# Patient Record
Sex: FEMALE | Race: WHITE | Employment: OTHER | URBAN - METROPOLITAN AREA
[De-identification: names, ages, dates, MRNs, and addresses within clinical notes are randomized per-mention and may not be internally consistent; named-entity substitution may affect disease eponyms.]

---

## 2017-01-04 ENCOUNTER — OFFICE VISIT (OUTPATIENT)
Dept: FAMILY MEDICINE CLINIC | Age: 67
End: 2017-01-04

## 2017-01-04 VITALS
RESPIRATION RATE: 16 BRPM | HEIGHT: 64 IN | SYSTOLIC BLOOD PRESSURE: 110 MMHG | OXYGEN SATURATION: 97 % | DIASTOLIC BLOOD PRESSURE: 64 MMHG | HEART RATE: 68 BPM | BODY MASS INDEX: 28.17 KG/M2 | WEIGHT: 165 LBS

## 2017-01-04 DIAGNOSIS — E03.8 OTHER SPECIFIED HYPOTHYROIDISM: ICD-10-CM

## 2017-01-04 DIAGNOSIS — F41.9 ANXIETY: ICD-10-CM

## 2017-01-04 DIAGNOSIS — K21.9 GASTROESOPHAGEAL REFLUX DISEASE WITHOUT ESOPHAGITIS: ICD-10-CM

## 2017-01-04 DIAGNOSIS — N32.81 OVERACTIVE BLADDER: ICD-10-CM

## 2017-01-04 DIAGNOSIS — K58.2 IRRITABLE BOWEL SYNDROME WITH BOTH CONSTIPATION AND DIARRHEA: ICD-10-CM

## 2017-01-04 DIAGNOSIS — E78.00 PURE HYPERCHOLESTEROLEMIA: Primary | ICD-10-CM

## 2017-01-04 DIAGNOSIS — R06.2 WHEEZING: ICD-10-CM

## 2017-01-04 PROCEDURE — 99214 OFFICE O/P EST MOD 30 MIN: CPT | Performed by: NURSE PRACTITIONER

## 2017-01-04 RX ORDER — ESCITALOPRAM OXALATE 5 MG/1
5 TABLET ORAL DAILY
Qty: 30 TABLET | Refills: 3 | Status: SHIPPED | OUTPATIENT
Start: 2017-01-04 | End: 2017-02-16 | Stop reason: SDUPTHER

## 2017-01-04 RX ORDER — ATORVASTATIN CALCIUM 10 MG/1
10 TABLET, FILM COATED ORAL DAILY
Qty: 90 TABLET | Refills: 1 | Status: CANCELLED | OUTPATIENT
Start: 2017-01-04

## 2017-01-04 RX ORDER — ESOMEPRAZOLE MAGNESIUM 20 MG/1
20 FOR SUSPENSION ORAL DAILY
Qty: 30 PACKET | Refills: 2 | Status: SHIPPED | OUTPATIENT
Start: 2017-01-04 | End: 2017-01-05

## 2017-01-04 RX ORDER — ESOMEPRAZOLE MAGNESIUM 20 MG/1
20 FOR SUSPENSION ORAL DAILY
COMMUNITY
End: 2017-01-04 | Stop reason: SDUPTHER

## 2017-01-04 RX ORDER — CLONAZEPAM 0.5 MG/1
0.5 TABLET ORAL 2 TIMES DAILY
Qty: 60 TABLET | Refills: 3 | Status: SHIPPED | OUTPATIENT
Start: 2017-01-04 | End: 2017-05-02 | Stop reason: SDUPTHER

## 2017-01-04 RX ORDER — TOLTERODINE TARTRATE 4 MG
CAPSULE, EXT RELEASE 24 HR ORAL
Qty: 90 CAPSULE | Refills: 2 | Status: SHIPPED | OUTPATIENT
Start: 2017-01-04 | End: 2017-05-04 | Stop reason: SDUPTHER

## 2017-01-04 RX ORDER — IMIQUIMOD 12.5 MG/.25G
CREAM TOPICAL
COMMUNITY
End: 2017-08-01 | Stop reason: SDUPTHER

## 2017-01-04 RX ORDER — ALBUTEROL SULFATE 90 UG/1
AEROSOL, METERED RESPIRATORY (INHALATION)
Qty: 3 INHALER | Refills: 2 | Status: SHIPPED | OUTPATIENT
Start: 2017-01-04 | End: 2017-04-07

## 2017-01-04 RX ORDER — LEVOTHYROXINE SODIUM 0.1 MG/1
TABLET ORAL
Qty: 90 TABLET | Refills: 1 | Status: SHIPPED | OUTPATIENT
Start: 2017-01-04 | End: 2017-05-04 | Stop reason: SDUPTHER

## 2017-01-04 RX ORDER — ATORVASTATIN CALCIUM 10 MG/1
10 TABLET, FILM COATED ORAL DAILY
Qty: 90 TABLET | Refills: 1 | Status: SHIPPED | OUTPATIENT
Start: 2017-01-04 | End: 2017-04-21

## 2017-01-04 ASSESSMENT — ENCOUNTER SYMPTOMS
COUGH: 0
DIARRHEA: 1
VOMITING: 0
BACK PAIN: 0
NAUSEA: 0
CHEST TIGHTNESS: 0

## 2017-01-05 ENCOUNTER — TELEPHONE (OUTPATIENT)
Dept: FAMILY MEDICINE CLINIC | Age: 67
End: 2017-01-05

## 2017-01-05 DIAGNOSIS — K21.9 GASTROESOPHAGEAL REFLUX DISEASE WITHOUT ESOPHAGITIS: Primary | ICD-10-CM

## 2017-01-06 ENCOUNTER — TELEPHONE (OUTPATIENT)
Dept: FAMILY MEDICINE CLINIC | Age: 67
End: 2017-01-06

## 2017-01-10 ENCOUNTER — TELEPHONE (OUTPATIENT)
Dept: FAMILY MEDICINE CLINIC | Age: 67
End: 2017-01-10

## 2017-01-19 ENCOUNTER — TELEPHONE (OUTPATIENT)
Dept: FAMILY MEDICINE CLINIC | Age: 67
End: 2017-01-19

## 2017-01-19 DIAGNOSIS — N95.2 VAGINAL ATROPHY: ICD-10-CM

## 2017-01-19 DIAGNOSIS — Z87.412 H/O VULVAR DYSPLASIA: Primary | ICD-10-CM

## 2017-01-20 DIAGNOSIS — Z80.8 FAMILY HISTORY OF SKIN CANCER: ICD-10-CM

## 2017-01-20 DIAGNOSIS — Z87.412 H/O VULVAR DYSPLASIA: Primary | ICD-10-CM

## 2017-02-14 ENCOUNTER — TELEPHONE (OUTPATIENT)
Dept: FAMILY MEDICINE CLINIC | Age: 67
End: 2017-02-14

## 2017-02-15 ENCOUNTER — TELEPHONE (OUTPATIENT)
Dept: FAMILY MEDICINE CLINIC | Age: 67
End: 2017-02-15

## 2017-02-16 ENCOUNTER — OFFICE VISIT (OUTPATIENT)
Dept: FAMILY MEDICINE CLINIC | Age: 67
End: 2017-02-16

## 2017-02-16 VITALS
OXYGEN SATURATION: 97 % | BODY MASS INDEX: 28.49 KG/M2 | WEIGHT: 166 LBS | DIASTOLIC BLOOD PRESSURE: 54 MMHG | HEART RATE: 88 BPM | SYSTOLIC BLOOD PRESSURE: 100 MMHG

## 2017-02-16 DIAGNOSIS — F51.01 PRIMARY INSOMNIA: ICD-10-CM

## 2017-02-16 DIAGNOSIS — G89.29 CHRONIC MIDLINE THORACIC BACK PAIN: ICD-10-CM

## 2017-02-16 DIAGNOSIS — F41.9 ANXIETY: ICD-10-CM

## 2017-02-16 DIAGNOSIS — K21.9 GASTROESOPHAGEAL REFLUX DISEASE WITHOUT ESOPHAGITIS: Primary | ICD-10-CM

## 2017-02-16 DIAGNOSIS — M54.6 CHRONIC MIDLINE THORACIC BACK PAIN: ICD-10-CM

## 2017-02-16 PROCEDURE — 99213 OFFICE O/P EST LOW 20 MIN: CPT | Performed by: NURSE PRACTITIONER

## 2017-02-16 RX ORDER — ZOLPIDEM TARTRATE 10 MG/1
10 TABLET ORAL NIGHTLY PRN
Qty: 30 TABLET | Refills: 0 | Status: SHIPPED | OUTPATIENT
Start: 2017-02-16 | End: 2017-04-17 | Stop reason: SDUPTHER

## 2017-02-16 RX ORDER — ESOMEPRAZOLE MAGNESIUM 20 MG
20 CAPSULE,DELAYED RELEASE (ENTERIC COATED) ORAL DAILY
Qty: 90 CAPSULE | Refills: 1 | Status: SHIPPED | OUTPATIENT
Start: 2017-02-16 | End: 2017-04-19

## 2017-02-16 RX ORDER — ESCITALOPRAM OXALATE 5 MG/1
5 TABLET ORAL DAILY
Qty: 90 TABLET | Refills: 1 | Status: SHIPPED | OUTPATIENT
Start: 2017-02-16 | End: 2017-04-21

## 2017-02-16 ASSESSMENT — ENCOUNTER SYMPTOMS
VOMITING: 0
CHEST TIGHTNESS: 0
NAUSEA: 0
BACK PAIN: 0
COUGH: 0

## 2017-02-24 ENCOUNTER — TELEPHONE (OUTPATIENT)
Dept: FAMILY MEDICINE CLINIC | Age: 67
End: 2017-02-24

## 2017-02-24 RX ORDER — METHOCARBAMOL 500 MG/1
TABLET, FILM COATED ORAL
Qty: 40 TABLET | Refills: 1 | Status: SHIPPED | OUTPATIENT
Start: 2017-02-24 | End: 2017-04-05 | Stop reason: SDUPTHER

## 2017-02-27 ENCOUNTER — TELEPHONE (OUTPATIENT)
Dept: FAMILY MEDICINE CLINIC | Age: 67
End: 2017-02-27

## 2017-02-28 ENCOUNTER — TELEPHONE (OUTPATIENT)
Dept: FAMILY MEDICINE CLINIC | Age: 67
End: 2017-02-28

## 2017-02-28 ENCOUNTER — OFFICE VISIT (OUTPATIENT)
Dept: DERMATOLOGY | Age: 67
End: 2017-02-28

## 2017-02-28 DIAGNOSIS — L82.1 SEBORRHEIC KERATOSES: ICD-10-CM

## 2017-02-28 DIAGNOSIS — Z12.83 SCREENING EXAM FOR SKIN CANCER: ICD-10-CM

## 2017-02-28 DIAGNOSIS — D22.9 MULTIPLE BENIGN NEVI: Primary | ICD-10-CM

## 2017-02-28 PROCEDURE — 99214 OFFICE O/P EST MOD 30 MIN: CPT | Performed by: DERMATOLOGY

## 2017-03-01 DIAGNOSIS — Z13.820 OSTEOPOROSIS SCREENING: Primary | ICD-10-CM

## 2017-03-08 ENCOUNTER — HOSPITAL ENCOUNTER (OUTPATIENT)
Dept: PHYSICAL THERAPY | Age: 67
Discharge: HOME | End: 2017-03-31
Attending: NURSE PRACTITIONER | Admitting: NURSE PRACTITIONER

## 2017-03-08 ENCOUNTER — TELEPHONE (OUTPATIENT)
Dept: FAMILY MEDICINE CLINIC | Age: 67
End: 2017-03-08

## 2017-03-10 ENCOUNTER — OFFICE VISIT (OUTPATIENT)
Dept: FAMILY MEDICINE CLINIC | Age: 67
End: 2017-03-10

## 2017-03-10 VITALS
DIASTOLIC BLOOD PRESSURE: 72 MMHG | RESPIRATION RATE: 17 BRPM | OXYGEN SATURATION: 98 % | HEART RATE: 80 BPM | HEIGHT: 64 IN | WEIGHT: 161.8 LBS | BODY MASS INDEX: 27.62 KG/M2 | TEMPERATURE: 97.1 F | SYSTOLIC BLOOD PRESSURE: 116 MMHG

## 2017-03-10 DIAGNOSIS — R35.0 URINARY FREQUENCY: Primary | ICD-10-CM

## 2017-03-10 DIAGNOSIS — R31.9 URINARY TRACT INFECTION WITH HEMATURIA, SITE UNSPECIFIED: ICD-10-CM

## 2017-03-10 DIAGNOSIS — N39.0 URINARY TRACT INFECTION WITH HEMATURIA, SITE UNSPECIFIED: ICD-10-CM

## 2017-03-10 DIAGNOSIS — R31.9 HEMATURIA: ICD-10-CM

## 2017-03-10 LAB
BILIRUBIN, POC: NORMAL
BLOOD URINE, POC: NORMAL
CLARITY, POC: NORMAL
COLOR, POC: YELLOW
GLUCOSE URINE, POC: NORMAL
KETONES, POC: NORMAL
LEUKOCYTE EST, POC: NORMAL
NITRITE, POC: NORMAL
PH, POC: 5
PROTEIN, POC: 100
SPECIFIC GRAVITY, POC: <=1.005
UROBILINOGEN, POC: 0.2

## 2017-03-10 PROCEDURE — 81002 URINALYSIS NONAUTO W/O SCOPE: CPT | Performed by: NURSE PRACTITIONER

## 2017-03-10 PROCEDURE — 87086 URINE CULTURE/COLONY COUNT: CPT | Performed by: NURSE PRACTITIONER

## 2017-03-10 PROCEDURE — 99213 OFFICE O/P EST LOW 20 MIN: CPT | Performed by: NURSE PRACTITIONER

## 2017-03-10 RX ORDER — SULFAMETHOXAZOLE AND TRIMETHOPRIM 800; 160 MG/1; MG/1
1 TABLET ORAL 2 TIMES DAILY
Qty: 14 TABLET | Refills: 0 | Status: SHIPPED | OUTPATIENT
Start: 2017-03-10 | End: 2017-03-13 | Stop reason: SINTOL

## 2017-03-10 ASSESSMENT — ENCOUNTER SYMPTOMS
VOMITING: 0
BACK PAIN: 0
COUGH: 0
NAUSEA: 0
CHEST TIGHTNESS: 0

## 2017-03-12 LAB
ORGANISM: ABNORMAL
URINE CULTURE, ROUTINE: ABNORMAL

## 2017-03-13 ENCOUNTER — TELEPHONE (OUTPATIENT)
Dept: FAMILY MEDICINE CLINIC | Age: 67
End: 2017-03-13

## 2017-03-13 DIAGNOSIS — R31.9 URINARY TRACT INFECTION WITH HEMATURIA, SITE UNSPECIFIED: Primary | ICD-10-CM

## 2017-03-13 DIAGNOSIS — N39.0 URINARY TRACT INFECTION WITH HEMATURIA, SITE UNSPECIFIED: Primary | ICD-10-CM

## 2017-03-13 RX ORDER — CIPROFLOXACIN 500 MG/1
500 TABLET, FILM COATED ORAL 2 TIMES DAILY
Qty: 14 TABLET | Refills: 0 | Status: SHIPPED | OUTPATIENT
Start: 2017-03-13 | End: 2017-03-14

## 2017-03-14 ENCOUNTER — TELEPHONE (OUTPATIENT)
Dept: FAMILY MEDICINE CLINIC | Age: 67
End: 2017-03-14

## 2017-03-14 RX ORDER — NITROFURANTOIN 25; 75 MG/1; MG/1
100 CAPSULE ORAL 2 TIMES DAILY
Qty: 14 CAPSULE | Refills: 0 | Status: SHIPPED | OUTPATIENT
Start: 2017-03-14 | End: 2017-03-21

## 2017-03-24 ENCOUNTER — TELEPHONE (OUTPATIENT)
Dept: FAMILY MEDICINE CLINIC | Age: 67
End: 2017-03-24

## 2017-03-24 ENCOUNTER — HOSPITAL ENCOUNTER (OUTPATIENT)
Dept: PHYSICAL THERAPY | Age: 67
Discharge: HOME | End: 2017-03-24
Admitting: NURSE PRACTITIONER

## 2017-03-28 ENCOUNTER — HOSPITAL ENCOUNTER (OUTPATIENT)
Dept: PHYSICAL THERAPY | Age: 67
Discharge: HOME | End: 2017-03-28
Admitting: NURSE PRACTITIONER

## 2017-03-30 ENCOUNTER — HOSPITAL ENCOUNTER (OUTPATIENT)
Dept: PHYSICAL THERAPY | Age: 67
Discharge: HOME | End: 2017-03-30
Admitting: NURSE PRACTITIONER

## 2017-04-03 ENCOUNTER — HOSPITAL ENCOUNTER (OUTPATIENT)
Dept: PHYSICAL THERAPY | Age: 67
Discharge: HOME | End: 2017-04-03
Admitting: NURSE PRACTITIONER

## 2017-04-04 ENCOUNTER — TELEPHONE (OUTPATIENT)
Dept: FAMILY MEDICINE CLINIC | Age: 67
End: 2017-04-04

## 2017-04-06 ENCOUNTER — HOSPITAL ENCOUNTER (OUTPATIENT)
Dept: PHYSICAL THERAPY | Age: 67
Discharge: HOME | End: 2017-04-06
Admitting: NURSE PRACTITIONER

## 2017-04-06 DIAGNOSIS — K21.9 GASTROESOPHAGEAL REFLUX DISEASE WITHOUT ESOPHAGITIS: ICD-10-CM

## 2017-04-06 RX ORDER — METHOCARBAMOL 500 MG/1
TABLET, FILM COATED ORAL
Qty: 40 TABLET | Refills: 1 | Status: SHIPPED | OUTPATIENT
Start: 2017-04-06 | End: 2017-04-26 | Stop reason: SDUPTHER

## 2017-04-06 RX ORDER — ESOMEPRAZOLE MAGNESIUM 20 MG
20 CAPSULE,DELAYED RELEASE (ENTERIC COATED) ORAL DAILY
Qty: 90 CAPSULE | Refills: 1 | OUTPATIENT
Start: 2017-04-06

## 2017-04-07 ENCOUNTER — HOSPITAL ENCOUNTER (OUTPATIENT)
Dept: MAMMOGRAPHY | Age: 67
Discharge: HOME | End: 2017-04-07
Attending: NURSE PRACTITIONER | Admitting: NURSE PRACTITIONER

## 2017-04-07 ENCOUNTER — TELEPHONE (OUTPATIENT)
Dept: FAMILY MEDICINE CLINIC | Age: 67
End: 2017-04-07

## 2017-04-07 DIAGNOSIS — Z13.820 ENCOUNTER FOR SCREENING FOR OSTEOPOROSIS: ICD-10-CM

## 2017-04-07 DIAGNOSIS — Z13.820 SCREENING FOR OSTEOPOROSIS: ICD-10-CM

## 2017-04-07 DIAGNOSIS — J45.20 MILD INTERMITTENT ASTHMA WITHOUT COMPLICATION: ICD-10-CM

## 2017-04-07 RX ORDER — ALBUTEROL SULFATE 90 UG/1
2 AEROSOL, METERED RESPIRATORY (INHALATION) EVERY 6 HOURS PRN
Qty: 1 INHALER | Refills: 3 | Status: SHIPPED | OUTPATIENT
Start: 2017-04-07 | End: 2017-08-07 | Stop reason: SDUPTHER

## 2017-04-10 ENCOUNTER — HOSPITAL ENCOUNTER (OUTPATIENT)
Dept: PHYSICAL THERAPY | Age: 67
Discharge: HOME | End: 2017-04-10
Admitting: NURSE PRACTITIONER

## 2017-04-10 DIAGNOSIS — J45.20 MILD INTERMITTENT ASTHMA WITHOUT COMPLICATION: ICD-10-CM

## 2017-04-10 DIAGNOSIS — K21.9 GASTROESOPHAGEAL REFLUX DISEASE WITHOUT ESOPHAGITIS: ICD-10-CM

## 2017-04-10 RX ORDER — ALBUTEROL SULFATE 90 UG/1
2 AEROSOL, METERED RESPIRATORY (INHALATION) EVERY 6 HOURS PRN
Qty: 3 INHALER | Refills: 3 | OUTPATIENT
Start: 2017-04-10

## 2017-04-10 RX ORDER — METHOCARBAMOL 500 MG/1
500 TABLET, FILM COATED ORAL 4 TIMES DAILY
Qty: 120 TABLET | Refills: 1 | OUTPATIENT
Start: 2017-04-10

## 2017-04-10 RX ORDER — ESOMEPRAZOLE MAGNESIUM 20 MG
20 CAPSULE,DELAYED RELEASE (ENTERIC COATED) ORAL DAILY
Qty: 270 CAPSULE | Refills: 1 | OUTPATIENT
Start: 2017-04-10

## 2017-04-12 ENCOUNTER — TELEPHONE (OUTPATIENT)
Dept: FAMILY MEDICINE CLINIC | Age: 67
End: 2017-04-12

## 2017-04-12 DIAGNOSIS — M79.10 MYALGIA: Primary | ICD-10-CM

## 2017-04-13 ENCOUNTER — HOSPITAL ENCOUNTER (OUTPATIENT)
Dept: PHYSICAL THERAPY | Age: 67
Discharge: HOME | End: 2017-04-13
Admitting: NURSE PRACTITIONER

## 2017-04-13 ENCOUNTER — TELEPHONE (OUTPATIENT)
Dept: FAMILY MEDICINE CLINIC | Age: 67
End: 2017-04-13

## 2017-04-13 DIAGNOSIS — M79.10 MYALGIA: ICD-10-CM

## 2017-04-13 LAB — TOTAL CK: 173 U/L (ref 26–192)

## 2017-04-17 DIAGNOSIS — F51.01 PRIMARY INSOMNIA: ICD-10-CM

## 2017-04-17 RX ORDER — ZOLPIDEM TARTRATE 10 MG/1
10 TABLET ORAL NIGHTLY PRN
Qty: 30 TABLET | Refills: 0 | Status: SHIPPED | OUTPATIENT
Start: 2017-04-17 | End: 2017-05-24 | Stop reason: SDUPTHER

## 2017-04-18 ENCOUNTER — TELEPHONE (OUTPATIENT)
Dept: FAMILY MEDICINE CLINIC | Age: 67
End: 2017-04-18

## 2017-04-19 ENCOUNTER — NURSE ONLY (OUTPATIENT)
Dept: FAMILY MEDICINE CLINIC | Age: 67
End: 2017-04-19

## 2017-04-19 ENCOUNTER — HOSPITAL ENCOUNTER (OUTPATIENT)
Dept: PHYSICAL THERAPY | Age: 67
Discharge: HOME | End: 2017-04-19
Admitting: NURSE PRACTITIONER

## 2017-04-19 DIAGNOSIS — K21.9 GASTROESOPHAGEAL REFLUX DISEASE WITHOUT ESOPHAGITIS: ICD-10-CM

## 2017-04-19 DIAGNOSIS — R39.9 UTI SYMPTOMS: Primary | ICD-10-CM

## 2017-04-19 DIAGNOSIS — K21.9 GASTROESOPHAGEAL REFLUX DISEASE WITHOUT ESOPHAGITIS: Primary | ICD-10-CM

## 2017-04-19 LAB
BILIRUBIN, POC: NORMAL
BLOOD URINE, POC: NORMAL
CLARITY, POC: CLEAR
COLOR, POC: YELLOW
GLUCOSE URINE, POC: NORMAL
KETONES, POC: NORMAL
LEUKOCYTE EST, POC: NORMAL
NITRITE, POC: NORMAL
PH, POC: 6
PROTEIN, POC: NORMAL
SPECIFIC GRAVITY, POC: 1.01
UROBILINOGEN, POC: 0.2

## 2017-04-19 PROCEDURE — 81002 URINALYSIS NONAUTO W/O SCOPE: CPT | Performed by: NURSE PRACTITIONER

## 2017-04-19 RX ORDER — ESOMEPRAZOLE MAGNESIUM 20 MG
20 CAPSULE,DELAYED RELEASE (ENTERIC COATED) ORAL DAILY
Qty: 90 CAPSULE | Refills: 1 | Status: CANCELLED | OUTPATIENT
Start: 2017-04-19

## 2017-04-20 ENCOUNTER — TELEPHONE (OUTPATIENT)
Dept: FAMILY MEDICINE CLINIC | Age: 67
End: 2017-04-20

## 2017-04-21 ENCOUNTER — TELEPHONE (OUTPATIENT)
Dept: INTERNAL MEDICINE CLINIC | Age: 67
End: 2017-04-21

## 2017-04-21 ENCOUNTER — OFFICE VISIT (OUTPATIENT)
Dept: INTERNAL MEDICINE CLINIC | Age: 67
End: 2017-04-21

## 2017-04-21 VITALS
HEIGHT: 64 IN | HEART RATE: 72 BPM | WEIGHT: 161 LBS | RESPIRATION RATE: 18 BRPM | SYSTOLIC BLOOD PRESSURE: 115 MMHG | DIASTOLIC BLOOD PRESSURE: 70 MMHG | BODY MASS INDEX: 27.49 KG/M2

## 2017-04-21 DIAGNOSIS — G89.29 CHRONIC MIDLINE THORACIC BACK PAIN: ICD-10-CM

## 2017-04-21 DIAGNOSIS — J45.20 MILD INTERMITTENT ASTHMA WITHOUT COMPLICATION: ICD-10-CM

## 2017-04-21 DIAGNOSIS — E03.9 ACQUIRED HYPOTHYROIDISM: ICD-10-CM

## 2017-04-21 DIAGNOSIS — F51.01 PRIMARY INSOMNIA: ICD-10-CM

## 2017-04-21 DIAGNOSIS — M54.6 CHRONIC MIDLINE THORACIC BACK PAIN: ICD-10-CM

## 2017-04-21 DIAGNOSIS — F41.9 ANXIETY: Primary | ICD-10-CM

## 2017-04-21 DIAGNOSIS — K21.9 GASTROESOPHAGEAL REFLUX DISEASE WITHOUT ESOPHAGITIS: ICD-10-CM

## 2017-04-21 LAB — URINE CULTURE, ROUTINE: NORMAL

## 2017-04-21 PROCEDURE — 99204 OFFICE O/P NEW MOD 45 MIN: CPT | Performed by: INTERNAL MEDICINE

## 2017-04-21 RX ORDER — PAROXETINE 10 MG/1
10 TABLET, FILM COATED ORAL DAILY
Qty: 30 TABLET | Refills: 0 | Status: CANCELLED | OUTPATIENT
Start: 2017-04-21

## 2017-04-21 RX ORDER — DULOXETIN HYDROCHLORIDE 30 MG/1
30 CAPSULE, DELAYED RELEASE ORAL DAILY
Qty: 30 CAPSULE | Refills: 0 | Status: SHIPPED | OUTPATIENT
Start: 2017-04-21 | End: 2017-06-26

## 2017-04-21 RX ORDER — MONTELUKAST SODIUM 10 MG/1
10 TABLET ORAL NIGHTLY
Qty: 30 TABLET | Refills: 0 | Status: SHIPPED | OUTPATIENT
Start: 2017-04-21 | End: 2017-06-26

## 2017-04-21 RX ORDER — GABAPENTIN 300 MG/1
300 CAPSULE ORAL 4 TIMES DAILY
Qty: 120 CAPSULE | Refills: 0 | Status: SHIPPED | OUTPATIENT
Start: 2017-04-21 | End: 2017-06-26

## 2017-04-26 ENCOUNTER — HOSPITAL ENCOUNTER (OUTPATIENT)
Dept: PHYSICAL THERAPY | Age: 67
Discharge: HOME | End: 2017-04-26
Admitting: NURSE PRACTITIONER

## 2017-04-27 ENCOUNTER — TELEPHONE (OUTPATIENT)
Dept: FAMILY MEDICINE CLINIC | Age: 67
End: 2017-04-27

## 2017-04-27 DIAGNOSIS — F41.9 ANXIETY: ICD-10-CM

## 2017-04-27 RX ORDER — METHOCARBAMOL 500 MG/1
500 TABLET, FILM COATED ORAL 2 TIMES DAILY
Qty: 60 TABLET | Refills: 11 | Status: SHIPPED | OUTPATIENT
Start: 2017-04-27 | End: 2017-05-31 | Stop reason: SDUPTHER

## 2017-04-27 RX ORDER — CLONAZEPAM 0.5 MG/1
0.5 TABLET ORAL 2 TIMES DAILY
Qty: 60 TABLET | Refills: 3 | OUTPATIENT
Start: 2017-04-27

## 2017-04-28 ENCOUNTER — TELEPHONE (OUTPATIENT)
Dept: FAMILY MEDICINE CLINIC | Age: 67
End: 2017-04-28

## 2017-04-28 ENCOUNTER — NURSE ONLY (OUTPATIENT)
Dept: FAMILY MEDICINE CLINIC | Age: 67
End: 2017-04-28

## 2017-04-28 DIAGNOSIS — R79.89 LOW SERUM SODIUM: Primary | ICD-10-CM

## 2017-04-28 LAB — SODIUM BLD-SCNC: 143 MMOL/L (ref 136–145)

## 2017-04-28 PROCEDURE — 36415 COLL VENOUS BLD VENIPUNCTURE: CPT | Performed by: NURSE PRACTITIONER

## 2017-04-29 LAB — POTASSIUM SERPL-SCNC: 4.2 MMOL/L (ref 3.5–5.1)

## 2017-05-01 ENCOUNTER — HOSPITAL ENCOUNTER (OUTPATIENT)
Dept: PHYSICAL THERAPY | Age: 67
Discharge: HOME | End: 2017-05-01
Admitting: NURSE PRACTITIONER

## 2017-05-01 ENCOUNTER — TELEPHONE (OUTPATIENT)
Dept: FAMILY MEDICINE CLINIC | Age: 67
End: 2017-05-01

## 2017-05-02 DIAGNOSIS — F41.9 ANXIETY: ICD-10-CM

## 2017-05-03 RX ORDER — CLONAZEPAM 0.5 MG/1
0.5 TABLET ORAL 2 TIMES DAILY
Qty: 60 TABLET | Refills: 0 | Status: SHIPPED | OUTPATIENT
Start: 2017-05-03 | End: 2017-05-31 | Stop reason: SDUPTHER

## 2017-05-04 ENCOUNTER — TELEPHONE (OUTPATIENT)
Dept: FAMILY MEDICINE CLINIC | Age: 67
End: 2017-05-04

## 2017-05-04 ENCOUNTER — HOSPITAL ENCOUNTER (OUTPATIENT)
Dept: PHYSICAL THERAPY | Age: 67
Discharge: HOME | End: 2017-05-04
Admitting: NURSE PRACTITIONER

## 2017-05-04 ENCOUNTER — OFFICE VISIT (OUTPATIENT)
Dept: FAMILY MEDICINE CLINIC | Age: 67
End: 2017-05-04

## 2017-05-04 VITALS
OXYGEN SATURATION: 96 % | WEIGHT: 156.6 LBS | SYSTOLIC BLOOD PRESSURE: 122 MMHG | BODY MASS INDEX: 26.73 KG/M2 | HEIGHT: 64 IN | DIASTOLIC BLOOD PRESSURE: 70 MMHG | HEART RATE: 76 BPM | RESPIRATION RATE: 13 BRPM

## 2017-05-04 DIAGNOSIS — E03.8 OTHER SPECIFIED HYPOTHYROIDISM: ICD-10-CM

## 2017-05-04 DIAGNOSIS — M51.36 DDD (DEGENERATIVE DISC DISEASE), LUMBAR: Primary | ICD-10-CM

## 2017-05-04 DIAGNOSIS — F41.9 ANXIETY: ICD-10-CM

## 2017-05-04 DIAGNOSIS — N32.81 OVERACTIVE BLADDER: ICD-10-CM

## 2017-05-04 DIAGNOSIS — E87.1 HYPONATREMIA: ICD-10-CM

## 2017-05-04 PROCEDURE — 99213 OFFICE O/P EST LOW 20 MIN: CPT | Performed by: NURSE PRACTITIONER

## 2017-05-04 RX ORDER — TOLTERODINE 4 MG/1
CAPSULE, EXTENDED RELEASE ORAL
Qty: 90 CAPSULE | Refills: 2 | Status: SHIPPED | OUTPATIENT
Start: 2017-05-04 | End: 2017-06-29 | Stop reason: SDUPTHER

## 2017-05-04 RX ORDER — CLONAZEPAM 0.5 MG/1
0.5 TABLET ORAL 2 TIMES DAILY
Qty: 60 TABLET | Refills: 0 | Status: CANCELLED | OUTPATIENT
Start: 2017-05-04

## 2017-05-04 RX ORDER — LEVOTHYROXINE SODIUM 0.1 MG/1
TABLET ORAL
Qty: 90 TABLET | Refills: 1 | Status: SHIPPED | OUTPATIENT
Start: 2017-05-04 | End: 2017-06-26 | Stop reason: SDUPTHER

## 2017-05-04 RX ORDER — IBUPROFEN 800 MG/1
800 TABLET ORAL EVERY 6 HOURS PRN
Qty: 120 TABLET | Refills: 3 | Status: SHIPPED | OUTPATIENT
Start: 2017-05-04 | End: 2017-08-07 | Stop reason: SDUPTHER

## 2017-05-04 RX ORDER — POLYETHYLENE GLYCOL 3350 17 G/17G
17 POWDER, FOR SOLUTION ORAL DAILY
COMMUNITY
End: 2017-05-04 | Stop reason: CLARIF

## 2017-05-05 LAB
ANION GAP SERPL CALCULATED.3IONS-SCNC: 11 MMOL/L (ref 3–16)
BUN BLDV-MCNC: 10 MG/DL (ref 7–20)
CALCIUM SERPL-MCNC: 9.1 MG/DL (ref 8.3–10.6)
CHLORIDE BLD-SCNC: 101 MMOL/L (ref 99–110)
CO2: 28 MMOL/L (ref 21–32)
CREAT SERPL-MCNC: 1 MG/DL (ref 0.6–1.2)
GFR AFRICAN AMERICAN: >60
GFR NON-AFRICAN AMERICAN: 56
GLUCOSE BLD-MCNC: 89 MG/DL (ref 70–99)
POTASSIUM SERPL-SCNC: 4.6 MMOL/L (ref 3.5–5.1)
SODIUM BLD-SCNC: 140 MMOL/L (ref 136–145)

## 2017-05-08 ENCOUNTER — HOSPITAL ENCOUNTER (OUTPATIENT)
Dept: PHYSICAL THERAPY | Age: 67
Discharge: HOME | End: 2017-05-08
Admitting: NURSE PRACTITIONER

## 2017-05-08 ENCOUNTER — TELEPHONE (OUTPATIENT)
Dept: FAMILY MEDICINE CLINIC | Age: 67
End: 2017-05-08

## 2017-05-10 ENCOUNTER — HOSPITAL ENCOUNTER (OUTPATIENT)
Dept: PHYSICAL THERAPY | Age: 67
Discharge: HOME | End: 2017-05-10
Admitting: NURSE PRACTITIONER

## 2017-05-15 ENCOUNTER — HOSPITAL ENCOUNTER (OUTPATIENT)
Dept: PHYSICAL THERAPY | Age: 67
Discharge: HOME | End: 2017-05-15
Admitting: NURSE PRACTITIONER

## 2017-05-17 ENCOUNTER — TELEPHONE (OUTPATIENT)
Dept: FAMILY MEDICINE CLINIC | Age: 67
End: 2017-05-17

## 2017-05-17 ENCOUNTER — HOSPITAL ENCOUNTER (OUTPATIENT)
Dept: PHYSICAL THERAPY | Age: 67
Discharge: HOME | End: 2017-05-17
Admitting: NURSE PRACTITIONER

## 2017-05-22 ASSESSMENT — ENCOUNTER SYMPTOMS
COUGH: 0
VOMITING: 0
CONSTIPATION: 1
NAUSEA: 0
BACK PAIN: 1
CHEST TIGHTNESS: 0

## 2017-05-31 ENCOUNTER — OFFICE VISIT (OUTPATIENT)
Dept: FAMILY MEDICINE CLINIC | Age: 67
End: 2017-05-31

## 2017-05-31 VITALS
HEIGHT: 64 IN | HEART RATE: 74 BPM | BODY MASS INDEX: 27.76 KG/M2 | WEIGHT: 162.6 LBS | DIASTOLIC BLOOD PRESSURE: 70 MMHG | OXYGEN SATURATION: 98 % | RESPIRATION RATE: 13 BRPM | SYSTOLIC BLOOD PRESSURE: 116 MMHG

## 2017-05-31 DIAGNOSIS — L30.9 DERMATITIS: ICD-10-CM

## 2017-05-31 DIAGNOSIS — F41.9 ANXIETY: ICD-10-CM

## 2017-05-31 DIAGNOSIS — J45.20 MILD INTERMITTENT ASTHMA WITHOUT COMPLICATION: ICD-10-CM

## 2017-05-31 DIAGNOSIS — K21.9 GASTROESOPHAGEAL REFLUX DISEASE WITHOUT ESOPHAGITIS: ICD-10-CM

## 2017-05-31 DIAGNOSIS — E78.2 MIXED HYPERLIPIDEMIA: ICD-10-CM

## 2017-05-31 DIAGNOSIS — Z00.00 PREVENTATIVE HEALTH CARE: Primary | ICD-10-CM

## 2017-05-31 DIAGNOSIS — M47.817 LUMBOSACRAL SPONDYLOSIS WITHOUT MYELOPATHY: Chronic | ICD-10-CM

## 2017-05-31 DIAGNOSIS — Z87.412 H/O VULVAR DYSPLASIA: ICD-10-CM

## 2017-05-31 DIAGNOSIS — E03.9 ACQUIRED HYPOTHYROIDISM: ICD-10-CM

## 2017-05-31 DIAGNOSIS — F51.01 PRIMARY INSOMNIA: ICD-10-CM

## 2017-05-31 DIAGNOSIS — M51.36 DDD (DEGENERATIVE DISC DISEASE), LUMBAR: ICD-10-CM

## 2017-05-31 LAB
A/G RATIO: 1.7 (ref 1.1–2.2)
ALBUMIN SERPL-MCNC: 3.8 G/DL (ref 3.4–5)
ALP BLD-CCNC: 49 U/L (ref 40–129)
ALT SERPL-CCNC: 15 U/L (ref 10–40)
ANION GAP SERPL CALCULATED.3IONS-SCNC: 10 MMOL/L (ref 3–16)
AST SERPL-CCNC: 19 U/L (ref 15–37)
BILIRUB SERPL-MCNC: <0.2 MG/DL (ref 0–1)
BUN BLDV-MCNC: 12 MG/DL (ref 7–20)
CALCIUM SERPL-MCNC: 8.7 MG/DL (ref 8.3–10.6)
CHLORIDE BLD-SCNC: 102 MMOL/L (ref 99–110)
CHOLESTEROL, TOTAL: 168 MG/DL (ref 0–199)
CO2: 31 MMOL/L (ref 21–32)
CREAT SERPL-MCNC: 1.1 MG/DL (ref 0.6–1.2)
GFR AFRICAN AMERICAN: >60
GFR NON-AFRICAN AMERICAN: 50
GLOBULIN: 2.2 G/DL
GLUCOSE BLD-MCNC: 79 MG/DL (ref 70–99)
HDLC SERPL-MCNC: 58 MG/DL (ref 40–60)
LDL CHOLESTEROL CALCULATED: 95 MG/DL
POTASSIUM SERPL-SCNC: 4.5 MMOL/L (ref 3.5–5.1)
SODIUM BLD-SCNC: 143 MMOL/L (ref 136–145)
TOTAL PROTEIN: 6 G/DL (ref 6.4–8.2)
TRIGL SERPL-MCNC: 77 MG/DL (ref 0–150)
VLDLC SERPL CALC-MCNC: 15 MG/DL

## 2017-05-31 PROCEDURE — 3288F FALL RISK ASSESSMENT DOCD: CPT | Performed by: NURSE PRACTITIONER

## 2017-05-31 PROCEDURE — 99397 PER PM REEVAL EST PAT 65+ YR: CPT | Performed by: NURSE PRACTITIONER

## 2017-05-31 RX ORDER — TRIAMCINOLONE ACETONIDE 1 MG/G
CREAM TOPICAL
Qty: 15 G | Refills: 0 | Status: SHIPPED | OUTPATIENT
Start: 2017-05-31 | End: 2017-12-13

## 2017-05-31 RX ORDER — METHOCARBAMOL 500 MG/1
500 TABLET, FILM COATED ORAL 3 TIMES DAILY
Qty: 90 TABLET | Refills: 0 | Status: SHIPPED | OUTPATIENT
Start: 2017-05-31 | End: 2017-07-29 | Stop reason: SDUPTHER

## 2017-05-31 RX ORDER — CLONAZEPAM 0.5 MG/1
0.5 TABLET ORAL 2 TIMES DAILY
Qty: 60 TABLET | Refills: 0 | Status: SHIPPED | OUTPATIENT
Start: 2017-05-31 | End: 2017-06-26 | Stop reason: SDUPTHER

## 2017-05-31 ASSESSMENT — ENCOUNTER SYMPTOMS
NAUSEA: 0
BACK PAIN: 1
COLOR CHANGE: 0
VOMITING: 0
COUGH: 0
CHEST TIGHTNESS: 0

## 2017-06-02 ENCOUNTER — HOSPITAL ENCOUNTER (OUTPATIENT)
Dept: PHYSICAL THERAPY | Age: 67
Discharge: HOME | End: 2017-06-02
Admitting: NURSE PRACTITIONER

## 2017-06-05 ENCOUNTER — HOSPITAL ENCOUNTER (OUTPATIENT)
Dept: PHYSICAL THERAPY | Age: 67
Discharge: HOME | End: 2017-06-05
Admitting: NURSE PRACTITIONER

## 2017-06-07 ENCOUNTER — HOSPITAL ENCOUNTER (OUTPATIENT)
Dept: PHYSICAL THERAPY | Age: 67
Discharge: HOME | End: 2017-06-07
Admitting: NURSE PRACTITIONER

## 2017-06-15 ENCOUNTER — HOSPITAL ENCOUNTER (OUTPATIENT)
Dept: PHYSICAL THERAPY | Age: 67
Discharge: HOME | End: 2017-06-15
Admitting: NURSE PRACTITIONER

## 2017-06-26 ENCOUNTER — OFFICE VISIT (OUTPATIENT)
Dept: FAMILY MEDICINE CLINIC | Age: 67
End: 2017-06-26

## 2017-06-26 ENCOUNTER — HOSPITAL ENCOUNTER (OUTPATIENT)
Dept: PHYSICAL THERAPY | Age: 67
Discharge: HOME | End: 2017-06-26
Admitting: NURSE PRACTITIONER

## 2017-06-26 VITALS
HEIGHT: 64 IN | WEIGHT: 158.4 LBS | DIASTOLIC BLOOD PRESSURE: 82 MMHG | OXYGEN SATURATION: 97 % | SYSTOLIC BLOOD PRESSURE: 116 MMHG | HEART RATE: 66 BPM | BODY MASS INDEX: 27.04 KG/M2 | RESPIRATION RATE: 13 BRPM

## 2017-06-26 DIAGNOSIS — E03.8 OTHER SPECIFIED HYPOTHYROIDISM: ICD-10-CM

## 2017-06-26 DIAGNOSIS — E03.9 ACQUIRED HYPOTHYROIDISM: ICD-10-CM

## 2017-06-26 DIAGNOSIS — F41.9 ANXIETY: ICD-10-CM

## 2017-06-26 DIAGNOSIS — F51.01 PRIMARY INSOMNIA: Primary | ICD-10-CM

## 2017-06-26 PROCEDURE — 99213 OFFICE O/P EST LOW 20 MIN: CPT | Performed by: NURSE PRACTITIONER

## 2017-06-26 RX ORDER — LEVOTHYROXINE SODIUM 0.1 MG/1
TABLET ORAL
Qty: 90 TABLET | Refills: 1 | Status: SHIPPED | OUTPATIENT
Start: 2017-06-26 | End: 2017-08-07 | Stop reason: SDUPTHER

## 2017-06-26 RX ORDER — ZOLPIDEM TARTRATE 10 MG/1
TABLET ORAL
Qty: 30 TABLET | Refills: 0 | Status: SHIPPED | OUTPATIENT
Start: 2017-06-26 | End: 2017-08-07 | Stop reason: SDUPTHER

## 2017-06-26 RX ORDER — CLONAZEPAM 0.5 MG/1
0.5 TABLET ORAL 2 TIMES DAILY
Qty: 60 TABLET | Refills: 0 | Status: SHIPPED | OUTPATIENT
Start: 2017-06-26 | End: 2017-07-29 | Stop reason: SDUPTHER

## 2017-06-26 ASSESSMENT — ENCOUNTER SYMPTOMS
BACK PAIN: 0
CHEST TIGHTNESS: 0
COUGH: 0
NAUSEA: 0
VOMITING: 0

## 2017-06-27 ENCOUNTER — TELEPHONE (OUTPATIENT)
Dept: FAMILY MEDICINE CLINIC | Age: 67
End: 2017-06-27

## 2017-06-28 ENCOUNTER — OFFICE VISIT (OUTPATIENT)
Dept: ORTHOPEDIC SURGERY | Age: 67
End: 2017-06-28

## 2017-06-28 VITALS
HEIGHT: 63 IN | SYSTOLIC BLOOD PRESSURE: 101 MMHG | WEIGHT: 166 LBS | BODY MASS INDEX: 29.41 KG/M2 | HEART RATE: 73 BPM | DIASTOLIC BLOOD PRESSURE: 59 MMHG

## 2017-06-28 DIAGNOSIS — R05.9 COUGH: ICD-10-CM

## 2017-06-28 DIAGNOSIS — M54.50 PAIN OF LUMBAR SPINE: Primary | ICD-10-CM

## 2017-06-28 DIAGNOSIS — F17.200 TOBACCO USE DISORDER: Primary | ICD-10-CM

## 2017-06-28 DIAGNOSIS — M54.6 THORACIC SPINE PAIN: ICD-10-CM

## 2017-06-28 PROCEDURE — 99213 OFFICE O/P EST LOW 20 MIN: CPT | Performed by: PHYSICAL MEDICINE & REHABILITATION

## 2017-06-28 PROCEDURE — 72100 X-RAY EXAM L-S SPINE 2/3 VWS: CPT | Performed by: PHYSICAL MEDICINE & REHABILITATION

## 2017-06-28 PROCEDURE — 72070 X-RAY EXAM THORAC SPINE 2VWS: CPT | Performed by: PHYSICAL MEDICINE & REHABILITATION

## 2017-06-29 ENCOUNTER — TELEPHONE (OUTPATIENT)
Dept: FAMILY MEDICINE CLINIC | Age: 67
End: 2017-06-29

## 2017-06-30 ENCOUNTER — TELEPHONE (OUTPATIENT)
Dept: ORTHOPEDIC SURGERY | Age: 67
End: 2017-06-30

## 2017-06-30 ENCOUNTER — NURSE ONLY (OUTPATIENT)
Dept: URGENT CARE | Age: 67
End: 2017-06-30

## 2017-06-30 DIAGNOSIS — R05.9 COUGH: Primary | ICD-10-CM

## 2017-07-07 ENCOUNTER — TELEPHONE (OUTPATIENT)
Dept: FAMILY MEDICINE CLINIC | Age: 67
End: 2017-07-07

## 2017-07-07 DIAGNOSIS — K21.9 GASTROESOPHAGEAL REFLUX DISEASE WITHOUT ESOPHAGITIS: Primary | ICD-10-CM

## 2017-07-07 RX ORDER — PANTOPRAZOLE SODIUM 40 MG/1
40 TABLET, DELAYED RELEASE ORAL DAILY
Qty: 30 TABLET | Refills: 3 | Status: SHIPPED | OUTPATIENT
Start: 2017-07-07 | End: 2017-12-13

## 2017-07-14 ENCOUNTER — TELEPHONE (OUTPATIENT)
Dept: FAMILY MEDICINE CLINIC | Age: 67
End: 2017-07-14

## 2017-07-14 ENCOUNTER — TELEPHONE (OUTPATIENT)
Dept: ORTHOPEDIC SURGERY | Age: 67
End: 2017-07-14

## 2017-07-14 DIAGNOSIS — M25.562 CHRONIC PAIN OF LEFT KNEE: Primary | ICD-10-CM

## 2017-07-14 DIAGNOSIS — G89.29 CHRONIC PAIN OF LEFT KNEE: Primary | ICD-10-CM

## 2017-07-17 ENCOUNTER — TELEPHONE (OUTPATIENT)
Dept: FAMILY MEDICINE CLINIC | Age: 67
End: 2017-07-17

## 2017-07-18 DIAGNOSIS — R25.2 CRAMP OF BOTH LOWER EXTREMITIES: Primary | ICD-10-CM

## 2017-07-19 DIAGNOSIS — R25.2 CRAMP OF BOTH LOWER EXTREMITIES: ICD-10-CM

## 2017-07-19 LAB
ANION GAP SERPL CALCULATED.3IONS-SCNC: 11 MMOL/L (ref 3–16)
BUN BLDV-MCNC: 16 MG/DL (ref 7–20)
CALCIUM SERPL-MCNC: 9.2 MG/DL (ref 8.3–10.6)
CHLORIDE BLD-SCNC: 100 MMOL/L (ref 99–110)
CO2: 28 MMOL/L (ref 21–32)
CREAT SERPL-MCNC: 1 MG/DL (ref 0.6–1.2)
GFR AFRICAN AMERICAN: >60
GFR NON-AFRICAN AMERICAN: 56
GLUCOSE BLD-MCNC: 79 MG/DL (ref 70–99)
POTASSIUM SERPL-SCNC: 4.1 MMOL/L (ref 3.5–5.1)
SODIUM BLD-SCNC: 139 MMOL/L (ref 136–145)

## 2017-07-24 ENCOUNTER — OFFICE VISIT (OUTPATIENT)
Dept: ORTHOPEDIC SURGERY | Age: 67
End: 2017-07-24

## 2017-07-24 VITALS — HEIGHT: 64 IN | BODY MASS INDEX: 27.25 KG/M2 | WEIGHT: 159.61 LBS

## 2017-07-24 DIAGNOSIS — M17.12 ARTHRITIS OF LEFT KNEE: ICD-10-CM

## 2017-07-24 DIAGNOSIS — M17.11 ARTHRITIS OF RIGHT KNEE: ICD-10-CM

## 2017-07-24 DIAGNOSIS — M25.561 PAIN IN BOTH KNEES, UNSPECIFIED CHRONICITY: Primary | ICD-10-CM

## 2017-07-24 DIAGNOSIS — M25.562 PAIN IN BOTH KNEES, UNSPECIFIED CHRONICITY: Primary | ICD-10-CM

## 2017-07-24 PROCEDURE — 73562 X-RAY EXAM OF KNEE 3: CPT | Performed by: ORTHOPAEDIC SURGERY

## 2017-07-24 PROCEDURE — 99214 OFFICE O/P EST MOD 30 MIN: CPT | Performed by: ORTHOPAEDIC SURGERY

## 2017-07-31 DIAGNOSIS — M47.817 LUMBOSACRAL SPONDYLOSIS WITHOUT MYELOPATHY: Chronic | ICD-10-CM

## 2017-07-31 DIAGNOSIS — F41.9 ANXIETY: ICD-10-CM

## 2017-07-31 RX ORDER — METHOCARBAMOL 500 MG/1
500 TABLET, FILM COATED ORAL 3 TIMES DAILY
Qty: 90 TABLET | Refills: 0 | Status: SHIPPED | OUTPATIENT
Start: 2017-07-31 | End: 2017-08-07 | Stop reason: SDUPTHER

## 2017-07-31 RX ORDER — CLONAZEPAM 0.5 MG/1
0.5 TABLET ORAL 2 TIMES DAILY
Qty: 60 TABLET | Refills: 0 | Status: SHIPPED | OUTPATIENT
Start: 2017-07-31 | End: 2017-08-28 | Stop reason: SDUPTHER

## 2017-08-02 ENCOUNTER — OFFICE VISIT (OUTPATIENT)
Dept: FAMILY MEDICINE CLINIC | Age: 67
End: 2017-08-02

## 2017-08-02 VITALS
HEART RATE: 80 BPM | HEIGHT: 64 IN | WEIGHT: 160.4 LBS | RESPIRATION RATE: 12 BRPM | BODY MASS INDEX: 27.39 KG/M2 | OXYGEN SATURATION: 98 % | SYSTOLIC BLOOD PRESSURE: 128 MMHG | DIASTOLIC BLOOD PRESSURE: 70 MMHG

## 2017-08-02 DIAGNOSIS — J45.20 MILD INTERMITTENT ASTHMA WITHOUT COMPLICATION: ICD-10-CM

## 2017-08-02 DIAGNOSIS — M16.0 OSTEOARTHRITIS OF BOTH HIPS, UNSPECIFIED OSTEOARTHRITIS TYPE: Primary | ICD-10-CM

## 2017-08-02 DIAGNOSIS — M51.36 DDD (DEGENERATIVE DISC DISEASE), LUMBAR: ICD-10-CM

## 2017-08-02 PROCEDURE — 99213 OFFICE O/P EST LOW 20 MIN: CPT | Performed by: NURSE PRACTITIONER

## 2017-08-02 RX ORDER — HYDROCORTISONE ACETATE 0.5 %
1 CREAM (GRAM) TOPICAL 2 TIMES DAILY
Qty: 60 CAPSULE | Refills: 5 | Status: SHIPPED | OUTPATIENT
Start: 2017-08-02 | End: 2018-02-27 | Stop reason: ALTCHOICE

## 2017-08-02 ASSESSMENT — ENCOUNTER SYMPTOMS
CONSTIPATION: 0
SHORTNESS OF BREATH: 0
CHEST TIGHTNESS: 0
BACK PAIN: 0
NAUSEA: 0

## 2017-08-03 ENCOUNTER — TELEPHONE (OUTPATIENT)
Dept: FAMILY MEDICINE CLINIC | Age: 67
End: 2017-08-03

## 2017-08-04 ENCOUNTER — TELEPHONE (OUTPATIENT)
Dept: FAMILY MEDICINE CLINIC | Age: 67
End: 2017-08-04

## 2017-08-04 DIAGNOSIS — M17.10 LOCALIZED OSTEOARTHROSIS, LOWER LEG: ICD-10-CM

## 2017-08-04 DIAGNOSIS — E78.2 MIXED HYPERLIPIDEMIA: Primary | ICD-10-CM

## 2017-08-04 DIAGNOSIS — M51.36 DDD (DEGENERATIVE DISC DISEASE), LUMBAR: Primary | ICD-10-CM

## 2017-08-04 RX ORDER — MAGNESIUM CARB/ALUMINUM HYDROX 105-160MG
1 TABLET,CHEWABLE ORAL 2 TIMES DAILY
Qty: 60 TABLET | Refills: 5 | Status: SHIPPED | OUTPATIENT
Start: 2017-08-04 | End: 2017-12-13

## 2017-08-04 NOTE — TELEPHONE ENCOUNTER
Pt asking about Co-Q 10 and if this might be something you would be willing to write an RX for instead of her buying it over the counter. If it's written as an RX, it will count towards her section 8 housing.

## 2017-08-07 ENCOUNTER — TELEPHONE (OUTPATIENT)
Dept: FAMILY MEDICINE CLINIC | Age: 67
End: 2017-08-07

## 2017-08-07 DIAGNOSIS — K21.9 GASTROESOPHAGEAL REFLUX DISEASE WITHOUT ESOPHAGITIS: ICD-10-CM

## 2017-08-07 DIAGNOSIS — M51.36 DDD (DEGENERATIVE DISC DISEASE), LUMBAR: ICD-10-CM

## 2017-08-07 DIAGNOSIS — M47.817 LUMBOSACRAL SPONDYLOSIS WITHOUT MYELOPATHY: Chronic | ICD-10-CM

## 2017-08-07 DIAGNOSIS — E78.2 MIXED HYPERLIPIDEMIA: Primary | ICD-10-CM

## 2017-08-07 DIAGNOSIS — F51.01 PRIMARY INSOMNIA: ICD-10-CM

## 2017-08-07 DIAGNOSIS — J45.20 MILD INTERMITTENT ASTHMA WITHOUT COMPLICATION: ICD-10-CM

## 2017-08-07 DIAGNOSIS — N32.81 OVERACTIVE BLADDER: ICD-10-CM

## 2017-08-07 DIAGNOSIS — F41.9 ANXIETY: ICD-10-CM

## 2017-08-07 RX ORDER — ALBUTEROL SULFATE 90 UG/1
2 AEROSOL, METERED RESPIRATORY (INHALATION) EVERY 6 HOURS PRN
Qty: 3 INHALER | Refills: 1 | Status: SHIPPED | OUTPATIENT
Start: 2017-08-07 | End: 2017-12-09 | Stop reason: SDUPTHER

## 2017-08-07 RX ORDER — METHOCARBAMOL 500 MG/1
500 TABLET, FILM COATED ORAL 3 TIMES DAILY
Qty: 90 TABLET | Refills: 0 | Status: SHIPPED | OUTPATIENT
Start: 2017-08-07 | End: 2017-09-01 | Stop reason: SDUPTHER

## 2017-08-07 RX ORDER — UBIDECARENONE 200 MG
1 CAPSULE ORAL DAILY
Qty: 30 CAPSULE | Refills: 5 | Status: SHIPPED | OUTPATIENT
Start: 2017-08-07 | End: 2019-10-21 | Stop reason: SDUPTHER

## 2017-08-07 RX ORDER — CLONAZEPAM 0.5 MG/1
0.5 TABLET ORAL 2 TIMES DAILY
Qty: 60 TABLET | Refills: 0 | OUTPATIENT
Start: 2017-08-07

## 2017-08-07 RX ORDER — TOLTERODINE 4 MG/1
CAPSULE, EXTENDED RELEASE ORAL
Qty: 90 CAPSULE | Refills: 1 | Status: SHIPPED | OUTPATIENT
Start: 2017-08-07 | End: 2017-12-13 | Stop reason: SDUPTHER

## 2017-08-07 RX ORDER — ZOLPIDEM TARTRATE 10 MG/1
TABLET ORAL
Qty: 30 TABLET | Refills: 0 | Status: SHIPPED | OUTPATIENT
Start: 2017-08-07 | End: 2017-08-28 | Stop reason: SDUPTHER

## 2017-08-07 RX ORDER — ATORVASTATIN CALCIUM 10 MG/1
10 TABLET, FILM COATED ORAL DAILY
Qty: 90 TABLET | Refills: 1 | Status: SHIPPED | OUTPATIENT
Start: 2017-08-07 | End: 2017-09-14

## 2017-08-07 RX ORDER — MONTELUKAST SODIUM 10 MG/1
10 TABLET ORAL NIGHTLY
Qty: 90 TABLET | Refills: 1 | Status: SHIPPED | OUTPATIENT
Start: 2017-08-07 | End: 2017-12-13 | Stop reason: SDUPTHER

## 2017-08-07 RX ORDER — LEVOTHYROXINE SODIUM 0.1 MG/1
TABLET ORAL
Qty: 90 TABLET | Refills: 1 | Status: SHIPPED | OUTPATIENT
Start: 2017-08-07 | End: 2018-01-17 | Stop reason: SDUPTHER

## 2017-08-07 RX ORDER — IBUPROFEN 800 MG/1
800 TABLET ORAL EVERY 8 HOURS PRN
Qty: 120 TABLET | Refills: 1 | Status: SHIPPED | OUTPATIENT
Start: 2017-08-07 | End: 2017-10-11 | Stop reason: SDUPTHER

## 2017-08-08 ENCOUNTER — TELEPHONE (OUTPATIENT)
Dept: FAMILY MEDICINE CLINIC | Age: 67
End: 2017-08-08

## 2017-08-09 DIAGNOSIS — M51.36 DDD (DEGENERATIVE DISC DISEASE), LUMBAR: Primary | ICD-10-CM

## 2017-08-15 ENCOUNTER — TELEPHONE (OUTPATIENT)
Dept: FAMILY MEDICINE CLINIC | Age: 67
End: 2017-08-15

## 2017-08-22 ENCOUNTER — TELEPHONE (OUTPATIENT)
Dept: FAMILY MEDICINE CLINIC | Age: 67
End: 2017-08-22

## 2017-08-28 DIAGNOSIS — F51.01 PRIMARY INSOMNIA: ICD-10-CM

## 2017-08-30 ENCOUNTER — OFFICE VISIT (OUTPATIENT)
Dept: FAMILY MEDICINE CLINIC | Age: 67
End: 2017-08-30

## 2017-08-30 VITALS
WEIGHT: 164 LBS | DIASTOLIC BLOOD PRESSURE: 76 MMHG | SYSTOLIC BLOOD PRESSURE: 118 MMHG | OXYGEN SATURATION: 97 % | HEIGHT: 64 IN | RESPIRATION RATE: 11 BRPM | HEART RATE: 69 BPM | BODY MASS INDEX: 28 KG/M2

## 2017-08-30 DIAGNOSIS — M47.817 LUMBOSACRAL SPONDYLOSIS WITHOUT MYELOPATHY: Primary | Chronic | ICD-10-CM

## 2017-08-30 DIAGNOSIS — M54.6 CHRONIC MIDLINE THORACIC BACK PAIN: ICD-10-CM

## 2017-08-30 DIAGNOSIS — K21.9 GASTROESOPHAGEAL REFLUX DISEASE WITHOUT ESOPHAGITIS: ICD-10-CM

## 2017-08-30 DIAGNOSIS — G89.29 CHRONIC MIDLINE THORACIC BACK PAIN: ICD-10-CM

## 2017-08-30 DIAGNOSIS — E03.9 ACQUIRED HYPOTHYROIDISM: ICD-10-CM

## 2017-08-30 PROCEDURE — 99213 OFFICE O/P EST LOW 20 MIN: CPT | Performed by: NURSE PRACTITIONER

## 2017-08-30 ASSESSMENT — ENCOUNTER SYMPTOMS
CHEST TIGHTNESS: 0
SHORTNESS OF BREATH: 0
NAUSEA: 0
CONSTIPATION: 0
BACK PAIN: 1

## 2017-09-01 ENCOUNTER — TELEPHONE (OUTPATIENT)
Dept: FAMILY MEDICINE CLINIC | Age: 67
End: 2017-09-01

## 2017-09-08 RX ORDER — ZOLPIDEM TARTRATE 10 MG/1
TABLET ORAL
Qty: 30 TABLET | Refills: 0 | Status: SHIPPED | OUTPATIENT
Start: 2017-09-08 | End: 2017-10-03 | Stop reason: SDUPTHER

## 2017-09-13 DIAGNOSIS — F51.01 PRIMARY INSOMNIA: ICD-10-CM

## 2017-09-13 RX ORDER — ZOLPIDEM TARTRATE 10 MG/1
TABLET ORAL
Qty: 30 TABLET | Refills: 0 | OUTPATIENT
Start: 2017-09-13

## 2017-09-19 ENCOUNTER — HOSPITAL ENCOUNTER (OUTPATIENT)
Dept: MAMMOGRAPHY | Age: 67
Discharge: HOME | End: 2017-09-19

## 2017-09-19 DIAGNOSIS — Z12.31 ENCOUNTER FOR SCREENING MAMMOGRAM FOR BREAST CANCER: ICD-10-CM

## 2017-09-27 ENCOUNTER — TELEPHONE (OUTPATIENT)
Dept: FAMILY MEDICINE CLINIC | Age: 67
End: 2017-09-27

## 2017-09-27 DIAGNOSIS — F51.01 PRIMARY INSOMNIA: ICD-10-CM

## 2017-09-27 DIAGNOSIS — E78.2 MIXED HYPERLIPIDEMIA: Primary | ICD-10-CM

## 2017-09-27 DIAGNOSIS — F41.9 ANXIETY: ICD-10-CM

## 2017-09-27 NOTE — TELEPHONE ENCOUNTER
Pt has appt on 10/04/2017, thinks it's for blood work, but she's scheduled with you. Can you order cholesterol blood work? And does she need appt with you or just blood drawn?

## 2017-09-28 RX ORDER — CLONAZEPAM 0.5 MG/1
TABLET ORAL
Qty: 60 TABLET | Refills: 0 | Status: SHIPPED | OUTPATIENT
Start: 2017-09-28 | End: 2017-10-26 | Stop reason: SDUPTHER

## 2017-09-28 RX ORDER — ZOLPIDEM TARTRATE 10 MG/1
TABLET ORAL
Qty: 30 TABLET | Refills: 0 | OUTPATIENT
Start: 2017-09-28

## 2017-09-29 ENCOUNTER — TELEPHONE (OUTPATIENT)
Dept: FAMILY MEDICINE CLINIC | Age: 67
End: 2017-09-29

## 2017-09-29 DIAGNOSIS — F51.01 PRIMARY INSOMNIA: ICD-10-CM

## 2017-10-02 NOTE — TELEPHONE ENCOUNTER
The diagnosis would need to be DDD lumbar spine and DJD bilateral hips.  Please contact and forward the information

## 2017-10-03 ENCOUNTER — NURSE ONLY (OUTPATIENT)
Dept: FAMILY MEDICINE CLINIC | Age: 67
End: 2017-10-03

## 2017-10-03 DIAGNOSIS — E78.2 MIXED HYPERLIPIDEMIA: ICD-10-CM

## 2017-10-03 DIAGNOSIS — Z23 FLU VACCINE NEED: Primary | ICD-10-CM

## 2017-10-03 DIAGNOSIS — F51.01 PRIMARY INSOMNIA: ICD-10-CM

## 2017-10-03 PROCEDURE — G0008 ADMIN INFLUENZA VIRUS VAC: HCPCS | Performed by: NURSE PRACTITIONER

## 2017-10-03 PROCEDURE — 90662 IIV NO PRSV INCREASED AG IM: CPT | Performed by: NURSE PRACTITIONER

## 2017-10-03 RX ORDER — ZOLPIDEM TARTRATE 10 MG/1
TABLET ORAL
Qty: 90 TABLET | Refills: 0 | Status: SHIPPED | OUTPATIENT
Start: 2017-10-03 | End: 2017-12-13

## 2017-10-04 ENCOUNTER — TELEPHONE (OUTPATIENT)
Dept: ORTHOPEDIC SURGERY | Age: 67
End: 2017-10-04

## 2017-10-04 LAB
ALBUMIN SERPL-MCNC: 4 G/DL
ALP BLD-CCNC: 45 U/L
ALT SERPL-CCNC: 15 U/L
ANION GAP SERPL CALCULATED.3IONS-SCNC: 1.7 MMOL/L
AST SERPL-CCNC: 18 U/L
BILIRUB SERPL-MCNC: 0.3 MG/DL (ref 0.1–1.4)
BUN BLDV-MCNC: 18 MG/DL
CALCIUM SERPL-MCNC: 9.2 MG/DL
CHLORIDE BLD-SCNC: 104 MMOL/L
CHOLESTEROL, TOTAL: 189 MG/DL
CHOLESTEROL/HDL RATIO: 2.9
CO2: 28 MMOL/L
CREAT SERPL-MCNC: 1.19 MG/DL
GFR CALCULATED: 48
GLUCOSE BLD-MCNC: 81 MG/DL
HDLC SERPL-MCNC: 65 MG/DL (ref 35–70)
LDL CHOLESTEROL CALCULATED: 105 MG/DL (ref 0–160)
POTASSIUM SERPL-SCNC: 4.4 MMOL/L
SODIUM BLD-SCNC: 140 MMOL/L
TOTAL PROTEIN: 6.4
TRIGL SERPL-MCNC: 93 MG/DL
VLDLC SERPL CALC-MCNC: NORMAL MG/DL

## 2017-10-04 NOTE — TELEPHONE ENCOUNTER
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 She had requested a prescription for physical therapy table. We did give her this prescription back in July. She is calling today for reimbursement. After discussing with her insurance company, the patient's DME provider will need to call the insurance company at [phone]    The reference number is [id number]    I have informed the patient that the supplier of her physical therapy table will need to be submitted to her insurance company for reimbursement. The patient tells me that she actually got the table from [employer].   We explained to her that this is likely not a reimbursable source

## 2017-10-05 ENCOUNTER — TELEPHONE (OUTPATIENT)
Dept: FAMILY MEDICINE CLINIC | Age: 67
End: 2017-10-05

## 2017-10-06 NOTE — TELEPHONE ENCOUNTER
Pt informed. States she stopped going to therapy because she couldn't afford it. She does the exercises at home 3 x a day. She bought the table and spoke to [facility] and they told her that if her PCP wrote an RX for it, they would reimburse her. Please advise.

## 2017-10-09 DIAGNOSIS — M47.817 LUMBOSACRAL SPONDYLOSIS WITHOUT MYELOPATHY: Primary | Chronic | ICD-10-CM

## 2017-10-09 DIAGNOSIS — M16.0 OSTEOARTHRITIS OF BOTH HIPS, UNSPECIFIED OSTEOARTHRITIS TYPE: ICD-10-CM

## 2017-10-09 DIAGNOSIS — M17.10 LOCALIZED OSTEOARTHROSIS, LOWER LEG: ICD-10-CM

## 2017-10-09 LAB
CLIENT CONTACT:: NORMAL
REFERENCE LAB: NORMAL

## 2017-10-11 DIAGNOSIS — M51.36 DDD (DEGENERATIVE DISC DISEASE), LUMBAR: ICD-10-CM

## 2017-10-13 RX ORDER — IBUPROFEN 800 MG/1
TABLET ORAL
Qty: 120 TABLET | Refills: 1 | Status: SHIPPED | OUTPATIENT
Start: 2017-10-13 | End: 2017-12-15 | Stop reason: SDUPTHER

## 2017-10-23 NOTE — TELEPHONE ENCOUNTER
Insurance will not cover lift chair if authorization is submitted through PCP.  Was informed that it must be submitted through specialty

## 2017-10-24 ENCOUNTER — TELEPHONE (OUTPATIENT)
Dept: FAMILY MEDICINE CLINIC | Age: 67
End: 2017-10-24

## 2017-10-24 NOTE — TELEPHONE ENCOUNTER
Gulshan calling for office notes that state why Lauren Matos is in need of a lift chair. They said the notes have to say unable to get out of chair on her own and that once she is standing she is able to walk on her own. Also, they said the only diagnosis the is usually accepted is severe arthritis in knees and hips. If information is available please fax to: [phone]  Or call [phone] x [phone] if not able to provided information.

## 2017-10-25 NOTE — TELEPHONE ENCOUNTER
Cindy Restrepo indicated Monday that she was in communication with insurance and they were stating the note will need to come from ortho, however, we will need to talk to Cindy Restrepo about those details.

## 2017-10-26 DIAGNOSIS — F41.9 ANXIETY: ICD-10-CM

## 2017-10-26 RX ORDER — CLONAZEPAM 0.5 MG/1
TABLET ORAL
Qty: 60 TABLET | Refills: 0 | Status: SHIPPED | OUTPATIENT
Start: 2017-10-26 | End: 2017-11-20 | Stop reason: SDUPTHER

## 2017-10-31 ENCOUNTER — TELEPHONE (OUTPATIENT)
Dept: FAMILY MEDICINE CLINIC | Age: 67
End: 2017-10-31

## 2017-11-20 DIAGNOSIS — F41.9 ANXIETY: ICD-10-CM

## 2017-11-20 NOTE — TELEPHONE ENCOUNTER
Last Seen: 8/30/2017    Last Writen: 10/26/2017    Last UDS: 08/29/2016    OARRS Run On: 09/28/2017    Med Agreement Signed On:  01/16/2017    Next Appointment:     Requested Prescriptions     Pending Prescriptions Disp Refills    clonazePAM (KLONOPIN) 0.5 MG tablet 60 tablet 0     Sig: TAKE ONE TABLET BY MOUTH TWICE DAILY (DO  NOT  EXCEED  5  ADDITIONAL  FILLS  BEFORE  12/23/17).

## 2017-11-26 RX ORDER — CLONAZEPAM 0.5 MG/1
TABLET ORAL
Qty: 60 TABLET | Refills: 0 | Status: SHIPPED | OUTPATIENT
Start: 2017-11-26 | End: 2017-12-27 | Stop reason: SDUPTHER

## 2017-12-01 ENCOUNTER — TELEPHONE (OUTPATIENT)
Dept: FAMILY MEDICINE CLINIC | Age: 67
End: 2017-12-01

## 2017-12-01 DIAGNOSIS — M47.817 LUMBOSACRAL SPONDYLOSIS WITHOUT MYELOPATHY: Chronic | ICD-10-CM

## 2017-12-04 RX ORDER — METHOCARBAMOL 500 MG/1
TABLET, FILM COATED ORAL
Qty: 90 TABLET | Refills: 0 | Status: SHIPPED | OUTPATIENT
Start: 2017-12-04 | End: 2017-12-13

## 2017-12-13 ENCOUNTER — OFFICE VISIT (OUTPATIENT)
Dept: FAMILY MEDICINE CLINIC | Age: 67
End: 2017-12-13

## 2017-12-13 VITALS
OXYGEN SATURATION: 99 % | RESPIRATION RATE: 16 BRPM | DIASTOLIC BLOOD PRESSURE: 74 MMHG | WEIGHT: 170 LBS | SYSTOLIC BLOOD PRESSURE: 124 MMHG | HEART RATE: 76 BPM | BODY MASS INDEX: 29.02 KG/M2 | TEMPERATURE: 97.4 F | HEIGHT: 64 IN

## 2017-12-13 DIAGNOSIS — M47.817 LUMBOSACRAL SPONDYLOSIS WITHOUT MYELOPATHY: Primary | Chronic | ICD-10-CM

## 2017-12-13 DIAGNOSIS — J35.8 TONSIL STONE: ICD-10-CM

## 2017-12-13 PROCEDURE — 1090F PRES/ABSN URINE INCON ASSESS: CPT | Performed by: NURSE PRACTITIONER

## 2017-12-13 PROCEDURE — 1036F TOBACCO NON-USER: CPT | Performed by: NURSE PRACTITIONER

## 2017-12-13 PROCEDURE — 3014F SCREEN MAMMO DOC REV: CPT | Performed by: NURSE PRACTITIONER

## 2017-12-13 PROCEDURE — G8417 CALC BMI ABV UP PARAM F/U: HCPCS | Performed by: NURSE PRACTITIONER

## 2017-12-13 PROCEDURE — G8484 FLU IMMUNIZE NO ADMIN: HCPCS | Performed by: NURSE PRACTITIONER

## 2017-12-13 PROCEDURE — 1123F ACP DISCUSS/DSCN MKR DOCD: CPT | Performed by: NURSE PRACTITIONER

## 2017-12-13 PROCEDURE — G8427 DOCREV CUR MEDS BY ELIG CLIN: HCPCS | Performed by: NURSE PRACTITIONER

## 2017-12-13 PROCEDURE — 4040F PNEUMOC VAC/ADMIN/RCVD: CPT | Performed by: NURSE PRACTITIONER

## 2017-12-13 PROCEDURE — 3017F COLORECTAL CA SCREEN DOC REV: CPT | Performed by: NURSE PRACTITIONER

## 2017-12-13 PROCEDURE — 99213 OFFICE O/P EST LOW 20 MIN: CPT | Performed by: NURSE PRACTITIONER

## 2017-12-13 PROCEDURE — G8399 PT W/DXA RESULTS DOCUMENT: HCPCS | Performed by: NURSE PRACTITIONER

## 2017-12-13 RX ORDER — ESTRADIOL 10 UG/1
10 INSERT VAGINAL DAILY
COMMUNITY
End: 2017-12-13 | Stop reason: ALTCHOICE

## 2017-12-13 RX ORDER — BACLOFEN 10 MG/1
10 TABLET ORAL 2 TIMES DAILY
Qty: 60 TABLET | Refills: 1 | Status: SHIPPED | OUTPATIENT
Start: 2017-12-13 | End: 2017-12-14 | Stop reason: SDUPTHER

## 2017-12-13 ASSESSMENT — ENCOUNTER SYMPTOMS
NAUSEA: 0
CHEST TIGHTNESS: 0
CONSTIPATION: 0
SORE THROAT: 1

## 2017-12-13 ASSESSMENT — PATIENT HEALTH QUESTIONNAIRE - PHQ9
SUM OF ALL RESPONSES TO PHQ QUESTIONS 1-9: 0
2. FEELING DOWN, DEPRESSED OR HOPELESS: 0
1. LITTLE INTEREST OR PLEASURE IN DOING THINGS: 0
SUM OF ALL RESPONSES TO PHQ9 QUESTIONS 1 & 2: 0

## 2017-12-21 DIAGNOSIS — F41.9 ANXIETY: ICD-10-CM

## 2017-12-21 RX ORDER — CLONAZEPAM 0.5 MG/1
TABLET ORAL
Qty: 60 TABLET | Refills: 0 | OUTPATIENT
Start: 2017-12-21

## 2017-12-26 ENCOUNTER — TELEPHONE (OUTPATIENT)
Dept: FAMILY MEDICINE CLINIC | Age: 67
End: 2017-12-26

## 2017-12-26 NOTE — TELEPHONE ENCOUNTER
Patient called in asking if you can call [employer] and get the Methocarbamol covered she stated that the baclofen is not helping.         Please advise

## 2017-12-27 ENCOUNTER — TELEPHONE (OUTPATIENT)
Dept: FAMILY MEDICINE CLINIC | Age: 67
End: 2017-12-27

## 2017-12-27 DIAGNOSIS — F41.9 ANXIETY: ICD-10-CM

## 2017-12-27 RX ORDER — CLONAZEPAM 0.5 MG/1
TABLET ORAL
Qty: 60 TABLET | Refills: 0 | Status: SHIPPED | OUTPATIENT
Start: 2017-12-27 | End: 2018-01-26 | Stop reason: SDUPTHER

## 2017-12-28 ENCOUNTER — TELEPHONE (OUTPATIENT)
Dept: FAMILY MEDICINE CLINIC | Age: 67
End: 2017-12-28

## 2017-12-28 DIAGNOSIS — F41.9 ANXIETY: ICD-10-CM

## 2017-12-28 NOTE — TELEPHONE ENCOUNTER
Pharmacy called. Rx for clonazepam .5mg    TAKE ONE TABLET BY MOUTH TWICE DAILY (DO  NOT  EXCEED  5  ADDITIONAL  FILLS  BEFORE  12/23/17)    Received on 12/27, was cancelled out by the do not exceed note. Patient called too. She said she needs to  this evening if possible due to weather, concerned it is going to get worse. Pharmacy closes at 5.

## 2017-12-29 ENCOUNTER — TELEPHONE (OUTPATIENT)
Dept: FAMILY MEDICINE CLINIC | Age: 67
End: 2017-12-29

## 2018-01-04 ENCOUNTER — TELEPHONE (OUTPATIENT)
Dept: FAMILY MEDICINE CLINIC | Age: 68
End: 2018-01-04

## 2018-01-10 ENCOUNTER — TELEPHONE (OUTPATIENT)
Dept: FAMILY MEDICINE CLINIC | Age: 68
End: 2018-01-10

## 2018-01-10 DIAGNOSIS — J35.8 TONSIL STONE: ICD-10-CM

## 2018-01-10 DIAGNOSIS — J35.8 TONSIL STONE: Primary | ICD-10-CM

## 2018-01-15 ENCOUNTER — TELEPHONE (OUTPATIENT)
Dept: FAMILY MEDICINE CLINIC | Age: 68
End: 2018-01-15

## 2018-01-17 ENCOUNTER — TELEPHONE (OUTPATIENT)
Dept: FAMILY MEDICINE CLINIC | Age: 68
End: 2018-01-17

## 2018-01-22 DIAGNOSIS — F41.9 ANXIETY: ICD-10-CM

## 2018-01-22 RX ORDER — CLONAZEPAM 0.5 MG/1
TABLET ORAL
Qty: 60 TABLET | Refills: 0 | Status: CANCELLED | OUTPATIENT
Start: 2018-01-22

## 2018-01-26 DIAGNOSIS — F41.9 ANXIETY: ICD-10-CM

## 2018-01-26 RX ORDER — CLONAZEPAM 0.5 MG/1
0.5 TABLET ORAL 2 TIMES DAILY
Qty: 60 TABLET | Refills: 0 | Status: SHIPPED | OUTPATIENT
Start: 2018-01-26 | End: 2018-02-20 | Stop reason: SDUPTHER

## 2018-01-29 ENCOUNTER — OFFICE VISIT (OUTPATIENT)
Dept: FAMILY MEDICINE CLINIC | Age: 68
End: 2018-01-29

## 2018-01-29 VITALS
DIASTOLIC BLOOD PRESSURE: 76 MMHG | HEART RATE: 82 BPM | WEIGHT: 176.8 LBS | RESPIRATION RATE: 18 BRPM | HEIGHT: 64 IN | OXYGEN SATURATION: 99 % | SYSTOLIC BLOOD PRESSURE: 124 MMHG | TEMPERATURE: 97.5 F | BODY MASS INDEX: 30.19 KG/M2

## 2018-01-29 DIAGNOSIS — M51.26 DISPLACEMENT OF LUMBAR INTERVERTEBRAL DISC WITHOUT MYELOPATHY: Chronic | ICD-10-CM

## 2018-01-29 DIAGNOSIS — M16.0 PRIMARY OSTEOARTHRITIS OF BOTH HIPS: ICD-10-CM

## 2018-01-29 DIAGNOSIS — H10.13 ALLERGIC CONJUNCTIVITIS OF BOTH EYES: Primary | ICD-10-CM

## 2018-01-29 PROCEDURE — 3017F COLORECTAL CA SCREEN DOC REV: CPT | Performed by: NURSE PRACTITIONER

## 2018-01-29 PROCEDURE — G8417 CALC BMI ABV UP PARAM F/U: HCPCS | Performed by: NURSE PRACTITIONER

## 2018-01-29 PROCEDURE — 1090F PRES/ABSN URINE INCON ASSESS: CPT | Performed by: NURSE PRACTITIONER

## 2018-01-29 PROCEDURE — 3014F SCREEN MAMMO DOC REV: CPT | Performed by: NURSE PRACTITIONER

## 2018-01-29 PROCEDURE — 1123F ACP DISCUSS/DSCN MKR DOCD: CPT | Performed by: NURSE PRACTITIONER

## 2018-01-29 PROCEDURE — 1036F TOBACCO NON-USER: CPT | Performed by: NURSE PRACTITIONER

## 2018-01-29 PROCEDURE — G8484 FLU IMMUNIZE NO ADMIN: HCPCS | Performed by: NURSE PRACTITIONER

## 2018-01-29 PROCEDURE — G8427 DOCREV CUR MEDS BY ELIG CLIN: HCPCS | Performed by: NURSE PRACTITIONER

## 2018-01-29 PROCEDURE — 99213 OFFICE O/P EST LOW 20 MIN: CPT | Performed by: NURSE PRACTITIONER

## 2018-01-29 PROCEDURE — G8399 PT W/DXA RESULTS DOCUMENT: HCPCS | Performed by: NURSE PRACTITIONER

## 2018-01-29 PROCEDURE — 4040F PNEUMOC VAC/ADMIN/RCVD: CPT | Performed by: NURSE PRACTITIONER

## 2018-01-29 RX ORDER — OLOPATADINE HYDROCHLORIDE 1 MG/ML
1 SOLUTION/ DROPS OPHTHALMIC 2 TIMES DAILY
Qty: 5 ML | Refills: 0 | Status: SHIPPED | OUTPATIENT
Start: 2018-01-29 | End: 2018-02-28

## 2018-01-29 RX ORDER — METHOCARBAMOL 500 MG/1
TABLET, FILM COATED ORAL
Qty: 90 TABLET | Refills: 0 | Status: SHIPPED | OUTPATIENT
Start: 2018-01-29 | End: 2018-03-02 | Stop reason: SDUPTHER

## 2018-01-29 ASSESSMENT — ENCOUNTER SYMPTOMS
BACK PAIN: 0
EYE DISCHARGE: 1
CONSTIPATION: 0
CHEST TIGHTNESS: 0
EYE REDNESS: 1

## 2018-01-29 NOTE — PATIENT INSTRUCTIONS
1. Jovita Peters was seen today for conjunctivitis. Diagnoses and all orders for this visit:    Allergic conjunctivitis of both eyes    Other orders  -     methocarbamol (ROBAXIN) 500 MG tablet; TAKE 1 TABLET THREE TIMES DAILY  -     olopatadine (PATANOL) 0.1 % ophthalmic solution; Place 1 drop into both eyes 2 times daily    2. Discussed insurance does not want to cover robaxin. Reordered again to [facility] to see if covered in this new calendar year.

## 2018-02-07 ENCOUNTER — TELEPHONE (OUTPATIENT)
Dept: FAMILY MEDICINE CLINIC | Age: 68
End: 2018-02-07

## 2018-02-07 NOTE — TELEPHONE ENCOUNTER
Pharm asking about methocarbamol RX, there is a duplication in therapy. Pharm asking for clarification. Reference #:  [id number].

## 2018-02-07 NOTE — TELEPHONE ENCOUNTER
[facility] will not send her the methocarbamol until her provider calls and lets them know she is not longer taking the baclofen 10 mg tablets.  Requesting a call to [facility] @ [phone]

## 2018-02-20 DIAGNOSIS — F41.9 ANXIETY: ICD-10-CM

## 2018-02-22 ENCOUNTER — TELEPHONE (OUTPATIENT)
Dept: FAMILY MEDICINE CLINIC | Age: 68
End: 2018-02-22

## 2018-02-22 ENCOUNTER — OFFICE VISIT (OUTPATIENT)
Dept: FAMILY MEDICINE CLINIC | Age: 68
End: 2018-02-22

## 2018-02-22 VITALS
BODY MASS INDEX: 30.56 KG/M2 | HEIGHT: 64 IN | SYSTOLIC BLOOD PRESSURE: 124 MMHG | DIASTOLIC BLOOD PRESSURE: 70 MMHG | WEIGHT: 179 LBS

## 2018-02-22 DIAGNOSIS — R68.2 DRY MOUTH: Primary | ICD-10-CM

## 2018-02-22 PROCEDURE — 99212 OFFICE O/P EST SF 10 MIN: CPT | Performed by: PHYSICIAN ASSISTANT

## 2018-02-22 ASSESSMENT — ENCOUNTER SYMPTOMS
BACK PAIN: 1
ABDOMINAL PAIN: 0
CHEST TIGHTNESS: 0
RHINORRHEA: 1
COUGH: 0

## 2018-02-22 NOTE — PROGRESS NOTES
has a normal mood and affect. Her behavior is normal. Judgment and thought content normal.   Nursing note and vitals reviewed. ASSESSMENT and PLAN:  1. Dry mouth     Drink water upon wakening in the morning. May use hard candy to encourage salivation. Use inhaler PRN. May use OTC biotin if needed for dry mouth. Call the office if symptoms worsen or do not improve over the next week.

## 2018-02-23 RX ORDER — CLONAZEPAM 0.5 MG/1
0.5 TABLET ORAL 2 TIMES DAILY
Qty: 60 TABLET | Refills: 0 | Status: SHIPPED | OUTPATIENT
Start: 2018-02-23 | End: 2018-03-24 | Stop reason: SDUPTHER

## 2018-02-26 ENCOUNTER — TELEPHONE (OUTPATIENT)
Dept: FAMILY MEDICINE CLINIC | Age: 68
End: 2018-02-26

## 2018-02-26 NOTE — TELEPHONE ENCOUNTER
Complaining of indigestion and gas x 24hrs. States around her rib cage area felt like squeezing effect but the inhaler relieved her symptoms. She she is not experiencing any chest pain, shoulder, jaw or arm pain. No Vomiting or Sweats  Some loose stools \"gassy loose stools\". Pt. Was asking about heart attack symptoms but DOES NOT want to go to the ER. We do not have any appts today.

## 2018-02-27 ENCOUNTER — OFFICE VISIT (OUTPATIENT)
Dept: FAMILY MEDICINE CLINIC | Age: 68
End: 2018-02-27

## 2018-02-27 VITALS
OXYGEN SATURATION: 99 % | HEIGHT: 64 IN | WEIGHT: 177 LBS | HEART RATE: 75 BPM | SYSTOLIC BLOOD PRESSURE: 100 MMHG | DIASTOLIC BLOOD PRESSURE: 70 MMHG | BODY MASS INDEX: 30.22 KG/M2

## 2018-02-27 DIAGNOSIS — L85.3 DRY SKIN: ICD-10-CM

## 2018-02-27 DIAGNOSIS — M47.817 LUMBOSACRAL SPONDYLOSIS WITHOUT MYELOPATHY: Primary | Chronic | ICD-10-CM

## 2018-02-27 DIAGNOSIS — S61.411A: ICD-10-CM

## 2018-02-27 DIAGNOSIS — M51.26 DISPLACEMENT OF LUMBAR INTERVERTEBRAL DISC WITHOUT MYELOPATHY: Chronic | ICD-10-CM

## 2018-02-27 PROCEDURE — 99213 OFFICE O/P EST LOW 20 MIN: CPT | Performed by: PHYSICIAN ASSISTANT

## 2018-02-27 ASSESSMENT — ENCOUNTER SYMPTOMS: SHORTNESS OF BREATH: 0

## 2018-02-27 NOTE — PATIENT INSTRUCTIONS
If you are unable to be seen at [facility] give us a call and we can refer you another spine specialist.    Keep hand clean. No bandage is needed at this time. If it starts to look swollen and gets hot please give us a call.

## 2018-02-28 ENCOUNTER — TELEPHONE (OUTPATIENT)
Dept: FAMILY MEDICINE CLINIC | Age: 68
End: 2018-02-28

## 2018-02-28 DIAGNOSIS — M47.817 LUMBOSACRAL SPONDYLOSIS WITHOUT MYELOPATHY: Primary | Chronic | ICD-10-CM

## 2018-02-28 DIAGNOSIS — M51.26 DISPLACEMENT OF LUMBAR INTERVERTEBRAL DISC WITHOUT MYELOPATHY: Chronic | ICD-10-CM

## 2018-03-06 ENCOUNTER — OFFICE VISIT (OUTPATIENT)
Dept: FAMILY MEDICINE CLINIC | Age: 68
End: 2018-03-06

## 2018-03-06 VITALS
DIASTOLIC BLOOD PRESSURE: 62 MMHG | HEART RATE: 79 BPM | OXYGEN SATURATION: 97 % | SYSTOLIC BLOOD PRESSURE: 110 MMHG | TEMPERATURE: 97 F | WEIGHT: 181 LBS | BODY MASS INDEX: 31.07 KG/M2

## 2018-03-06 DIAGNOSIS — H69.81 EUSTACHIAN TUBE DYSFUNCTION, RIGHT: Primary | ICD-10-CM

## 2018-03-06 PROCEDURE — 99212 OFFICE O/P EST SF 10 MIN: CPT | Performed by: PHYSICIAN ASSISTANT

## 2018-03-06 ASSESSMENT — ENCOUNTER SYMPTOMS
NAUSEA: 0
EYE DISCHARGE: 0
EYE ITCHING: 0
SHORTNESS OF BREATH: 0
SINUS PRESSURE: 0
SORE THROAT: 0
RHINORRHEA: 0

## 2018-03-13 ENCOUNTER — TELEPHONE (OUTPATIENT)
Dept: FAMILY MEDICINE CLINIC | Age: 68
End: 2018-03-13

## 2018-03-14 NOTE — TELEPHONE ENCOUNTER
Pt checking on status of PT request, fax to [phone], would like to see [facility]. Sees back surgeon tomorrow.

## 2018-03-15 ENCOUNTER — TELEPHONE (OUTPATIENT)
Dept: FAMILY MEDICINE CLINIC | Age: 68
End: 2018-03-15

## 2018-03-15 NOTE — TELEPHONE ENCOUNTER
Pt asking for a referral for Dr. Estuardo MONTES), for physical medicine and rehab.   Fax to [phone]

## 2018-03-16 DIAGNOSIS — M51.36 DDD (DEGENERATIVE DISC DISEASE), LUMBAR: Primary | ICD-10-CM

## 2018-03-16 NOTE — TELEPHONE ENCOUNTER
I placed the referral to Physical medicine and rehabilitation. Does she want physical therapy as well?

## 2018-03-21 DIAGNOSIS — K21.9 GASTROESOPHAGEAL REFLUX DISEASE WITHOUT ESOPHAGITIS: ICD-10-CM

## 2018-03-22 NOTE — TELEPHONE ENCOUNTER
Called to follow up on request for referral to rheumatologist.    Has appt with Dr. Todd Jean-Baptiste 4/27. In [place] on Fridays. [facility]. Spending most of her time in bed. Said she is not doing well at all.

## 2018-03-23 ENCOUNTER — TELEPHONE (OUTPATIENT)
Dept: FAMILY MEDICINE CLINIC | Age: 68
End: 2018-03-23

## 2018-03-26 DIAGNOSIS — G89.29 CHRONIC MIDLINE THORACIC BACK PAIN: ICD-10-CM

## 2018-03-26 DIAGNOSIS — M54.6 CHRONIC MIDLINE THORACIC BACK PAIN: ICD-10-CM

## 2018-03-26 DIAGNOSIS — M16.0 PRIMARY OSTEOARTHRITIS OF BOTH HIPS: ICD-10-CM

## 2018-03-26 DIAGNOSIS — M51.36 DDD (DEGENERATIVE DISC DISEASE), LUMBAR: Primary | ICD-10-CM

## 2018-03-29 ENCOUNTER — TELEPHONE (OUTPATIENT)
Dept: FAMILY MEDICINE CLINIC | Age: 68
End: 2018-03-29

## 2018-03-29 DIAGNOSIS — M51.36 DDD (DEGENERATIVE DISC DISEASE), LUMBAR: Primary | ICD-10-CM

## 2018-04-06 ENCOUNTER — TELEPHONE (OUTPATIENT)
Dept: FAMILY MEDICINE CLINIC | Age: 68
End: 2018-04-06

## 2018-04-06 ENCOUNTER — NURSE ONLY (OUTPATIENT)
Dept: FAMILY MEDICINE CLINIC | Age: 68
End: 2018-04-06

## 2018-04-06 DIAGNOSIS — R68.89 FLU-LIKE SYMPTOMS: Primary | ICD-10-CM

## 2018-04-06 LAB
INFLUENZA A ANTIBODY: NORMAL
INFLUENZA B ANTIBODY: NORMAL

## 2018-04-06 PROCEDURE — 87804 INFLUENZA ASSAY W/OPTIC: CPT | Performed by: NURSE PRACTITIONER

## 2018-04-09 ENCOUNTER — OFFICE VISIT (OUTPATIENT)
Dept: DERMATOLOGY | Age: 68
End: 2018-04-09

## 2018-04-09 DIAGNOSIS — L82.1 SEBORRHEIC KERATOSES: ICD-10-CM

## 2018-04-09 DIAGNOSIS — D22.9 MULTIPLE BENIGN NEVI: Primary | ICD-10-CM

## 2018-04-09 DIAGNOSIS — L72.0 EPIDERMOID CYST: ICD-10-CM

## 2018-04-09 DIAGNOSIS — Z12.83 SCREENING EXAM FOR SKIN CANCER: ICD-10-CM

## 2018-04-09 PROCEDURE — G8417 CALC BMI ABV UP PARAM F/U: HCPCS | Performed by: DERMATOLOGY

## 2018-04-09 PROCEDURE — 4040F PNEUMOC VAC/ADMIN/RCVD: CPT | Performed by: DERMATOLOGY

## 2018-04-09 PROCEDURE — 3014F SCREEN MAMMO DOC REV: CPT | Performed by: DERMATOLOGY

## 2018-04-09 PROCEDURE — G8427 DOCREV CUR MEDS BY ELIG CLIN: HCPCS | Performed by: DERMATOLOGY

## 2018-04-09 PROCEDURE — 1123F ACP DISCUSS/DSCN MKR DOCD: CPT | Performed by: DERMATOLOGY

## 2018-04-09 PROCEDURE — 1090F PRES/ABSN URINE INCON ASSESS: CPT | Performed by: DERMATOLOGY

## 2018-04-09 PROCEDURE — 99213 OFFICE O/P EST LOW 20 MIN: CPT | Performed by: DERMATOLOGY

## 2018-04-09 PROCEDURE — 3017F COLORECTAL CA SCREEN DOC REV: CPT | Performed by: DERMATOLOGY

## 2018-04-09 PROCEDURE — 1036F TOBACCO NON-USER: CPT | Performed by: DERMATOLOGY

## 2018-04-09 PROCEDURE — G8399 PT W/DXA RESULTS DOCUMENT: HCPCS | Performed by: DERMATOLOGY

## 2018-04-16 ENCOUNTER — TELEPHONE (OUTPATIENT)
Dept: FAMILY MEDICINE CLINIC | Age: 68
End: 2018-04-16

## 2018-04-16 DIAGNOSIS — M16.0 PRIMARY OSTEOARTHRITIS OF BOTH HIPS: Primary | ICD-10-CM

## 2018-04-17 ENCOUNTER — TELEPHONE (OUTPATIENT)
Dept: FAMILY MEDICINE CLINIC | Age: 68
End: 2018-04-17

## 2018-04-20 DIAGNOSIS — F41.9 ANXIETY: ICD-10-CM

## 2018-04-23 ENCOUNTER — TELEPHONE (OUTPATIENT)
Dept: FAMILY MEDICINE CLINIC | Age: 68
End: 2018-04-23

## 2018-04-24 ENCOUNTER — TELEPHONE (OUTPATIENT)
Dept: FAMILY MEDICINE CLINIC | Age: 68
End: 2018-04-24

## 2018-04-25 RX ORDER — CLONAZEPAM 0.5 MG/1
TABLET ORAL
Qty: 60 TABLET | Refills: 0 | Status: SHIPPED | OUTPATIENT
Start: 2018-04-25 | End: 2018-06-18 | Stop reason: SDUPTHER

## 2018-05-01 ENCOUNTER — OFFICE VISIT (OUTPATIENT)
Dept: FAMILY MEDICINE CLINIC | Age: 68
End: 2018-05-01

## 2018-05-01 VITALS
DIASTOLIC BLOOD PRESSURE: 60 MMHG | WEIGHT: 182 LBS | OXYGEN SATURATION: 98 % | SYSTOLIC BLOOD PRESSURE: 100 MMHG | HEART RATE: 74 BPM | BODY MASS INDEX: 31.24 KG/M2

## 2018-05-01 DIAGNOSIS — S63.502A SPRAIN OF LEFT WRIST, INITIAL ENCOUNTER: Primary | ICD-10-CM

## 2018-05-01 PROCEDURE — 99212 OFFICE O/P EST SF 10 MIN: CPT | Performed by: PHYSICIAN ASSISTANT

## 2018-05-01 ASSESSMENT — ENCOUNTER SYMPTOMS: COLOR CHANGE: 0

## 2018-05-04 ENCOUNTER — TELEPHONE (OUTPATIENT)
Dept: FAMILY MEDICINE CLINIC | Age: 68
End: 2018-05-04

## 2018-05-08 ENCOUNTER — TELEPHONE (OUTPATIENT)
Dept: FAMILY MEDICINE CLINIC | Age: 68
End: 2018-05-08

## 2018-10-04 ENCOUNTER — HOSPITAL ENCOUNTER (OUTPATIENT)
Dept: WOMENS IMAGING | Age: 68
Discharge: HOME | End: 2018-10-04
Payer: MEDICARE

## 2018-10-04 DIAGNOSIS — Z12.31 VISIT FOR SCREENING MAMMOGRAM: ICD-10-CM

## 2018-10-04 PROCEDURE — 77067 SCR MAMMO BI INCL CAD: CPT

## 2019-01-14 ENCOUNTER — OFFICE VISIT (OUTPATIENT)
Dept: FAMILY MEDICINE CLINIC | Age: 69
End: 2019-01-14
Payer: COMMERCIAL

## 2019-01-14 VITALS
DIASTOLIC BLOOD PRESSURE: 58 MMHG | WEIGHT: 199.6 LBS | HEIGHT: 64 IN | OXYGEN SATURATION: 99 % | BODY MASS INDEX: 34.08 KG/M2 | HEART RATE: 71 BPM | SYSTOLIC BLOOD PRESSURE: 110 MMHG

## 2019-01-14 DIAGNOSIS — E78.2 MIXED HYPERLIPIDEMIA: ICD-10-CM

## 2019-01-14 DIAGNOSIS — E03.9 ACQUIRED HYPOTHYROIDISM: ICD-10-CM

## 2019-01-14 DIAGNOSIS — F41.9 ANXIETY: Primary | ICD-10-CM

## 2019-01-14 DIAGNOSIS — K21.9 GASTROESOPHAGEAL REFLUX DISEASE WITHOUT ESOPHAGITIS: ICD-10-CM

## 2019-01-14 PROCEDURE — 99202 OFFICE O/P NEW SF 15 MIN: CPT | Performed by: FAMILY MEDICINE

## 2019-01-14 RX ORDER — CLONAZEPAM 1 MG/1
1 TABLET ORAL 2 TIMES DAILY PRN
Qty: 60 TABLET | Refills: 1 | OUTPATIENT
Start: 2019-01-14 | End: 2019-03-13 | Stop reason: SDUPTHER

## 2019-01-14 RX ORDER — ZINC GLUCONATE 50 MG
TABLET ORAL
COMMUNITY
End: 2022-04-08

## 2019-01-14 RX ORDER — DIPHENHYDRAMINE HCL 25 MG
25 TABLET ORAL EVERY 6 HOURS PRN
COMMUNITY
End: 2019-09-16

## 2019-01-14 RX ORDER — LANOLIN ALCOHOL/MO/W.PET/CERES
400 CREAM (GRAM) TOPICAL DAILY
COMMUNITY

## 2019-01-14 RX ORDER — CLONAZEPAM 0.5 MG/1
TABLET ORAL
Qty: 60 TABLET | Refills: 1 | OUTPATIENT
Start: 2019-01-14 | End: 2019-01-14 | Stop reason: CLARIF

## 2019-01-14 RX ORDER — MULTIVIT WITH MINERALS/LUTEIN
1000 TABLET ORAL DAILY
COMMUNITY

## 2019-01-14 ASSESSMENT — PATIENT HEALTH QUESTIONNAIRE - PHQ9
SUM OF ALL RESPONSES TO PHQ9 QUESTIONS 1 & 2: 0
SUM OF ALL RESPONSES TO PHQ QUESTIONS 1-9: 0
SUM OF ALL RESPONSES TO PHQ QUESTIONS 1-9: 0
1. LITTLE INTEREST OR PLEASURE IN DOING THINGS: 0
2. FEELING DOWN, DEPRESSED OR HOPELESS: 0

## 2019-01-16 DIAGNOSIS — N32.81 OVERACTIVE BLADDER: ICD-10-CM

## 2019-01-21 RX ORDER — TOLTERODINE 4 MG/1
CAPSULE, EXTENDED RELEASE ORAL
Qty: 90 CAPSULE | Refills: 3 | Status: SHIPPED | OUTPATIENT
Start: 2019-01-21 | End: 2019-09-16

## 2019-02-18 ENCOUNTER — TELEPHONE (OUTPATIENT)
Dept: FAMILY MEDICINE CLINIC | Age: 69
End: 2019-02-18

## 2019-02-25 ASSESSMENT — ENCOUNTER SYMPTOMS
SHORTNESS OF BREATH: 0
ABDOMINAL PAIN: 0

## 2019-03-04 ENCOUNTER — TELEPHONE (OUTPATIENT)
Dept: FAMILY MEDICINE CLINIC | Age: 69
End: 2019-03-04

## 2019-03-04 DIAGNOSIS — F41.9 ANXIETY: ICD-10-CM

## 2019-03-04 DIAGNOSIS — M51.36 DDD (DEGENERATIVE DISC DISEASE), LUMBAR: ICD-10-CM

## 2019-03-07 RX ORDER — CLONAZEPAM 1 MG/1
1 TABLET ORAL 2 TIMES DAILY PRN
Qty: 60 TABLET | Refills: 1 | Status: CANCELLED | OUTPATIENT
Start: 2019-03-07 | End: 2019-04-06

## 2019-03-08 RX ORDER — IBUPROFEN 800 MG/1
TABLET ORAL
Qty: 120 TABLET | Refills: 0 | Status: SHIPPED | OUTPATIENT
Start: 2019-03-08 | End: 2019-09-16

## 2019-03-08 RX ORDER — LEVOTHYROXINE SODIUM 0.1 MG/1
TABLET ORAL
Qty: 90 TABLET | Refills: 1 | Status: SHIPPED | OUTPATIENT
Start: 2019-03-08 | End: 2019-10-16 | Stop reason: SDUPTHER

## 2019-03-14 ENCOUNTER — TELEPHONE (OUTPATIENT)
Dept: FAMILY MEDICINE CLINIC | Age: 69
End: 2019-03-14

## 2019-03-18 ENCOUNTER — TELEPHONE (OUTPATIENT)
Dept: FAMILY MEDICINE CLINIC | Age: 69
End: 2019-03-18

## 2019-03-29 ENCOUNTER — TELEPHONE (OUTPATIENT)
Dept: FAMILY MEDICINE CLINIC | Age: 69
End: 2019-03-29

## 2019-03-29 RX ORDER — IBUPROFEN 400 MG/1
400-800 TABLET ORAL EVERY 8 HOURS PRN
Qty: 120 TABLET | Refills: 1 | Status: SHIPPED | OUTPATIENT
Start: 2019-03-29 | End: 2019-05-29 | Stop reason: SDUPTHER

## 2019-03-29 NOTE — TELEPHONE ENCOUNTER
Nurse - Any idea what happened? Please ask patient how many 400mg tabs she might take in one day - what is her usual max number in 24 hrs? Also, please remind her to keep her appt scheduled in July (to recheck kidney filtering then).

## 2019-03-29 NOTE — TELEPHONE ENCOUNTER
Noted.      Nurse - I sent the 400mg tablets to [address] her know that IF her pain is bad, then she can take ONE of the 100mg tablets that we had previously Rx'd, but if not as bad, then to take the new (400mg) Rx we just sent today. Try to use sparingly, and take WITH FOOD (to help prevent stomach upset).

## 2019-03-29 NOTE — TELEPHONE ENCOUNTER
Patient called regarding the Ibuprofen prescription. She received 800 mg tablets. She states she is supposed to be taking 400 mg tablets. The pharmacy told her that if she cuts them in half, it removes the coating and it could upset her stomach.

## 2019-03-29 NOTE — TELEPHONE ENCOUNTER
Looking into the pt's chart, she has always been on the 800 mg. I spoke with the pt and she states that Dr. Jimmy Bond changed this to 400 mg 1-2 tablets every 8 hours prn pain. She states that some days she takes 3 in one day, some days she only takes one. States that it depends on her pain level that day.

## 2019-04-02 NOTE — TELEPHONE ENCOUNTER
Classel Mccall is requesting refill(s) methocarbamol  Last OV 1/14/19 (pertaining to medication)  LR 4/25/18 (per medication requested)  Next office visit scheduled or attempted Yes   If no, reason:  Pt is scheduled for 7/16/19

## 2019-04-04 NOTE — TELEPHONE ENCOUNTER
Nurse - Please try to get more info regarding HOW she usually takes this medication. (I can see she got #90 from Mariela Glass back nearly one year ago, 4/25/18 for #90 - new request says #360 - does she ever take 4 tablets/day on a regular or recurring basis ? ?)

## 2019-04-05 NOTE — TELEPHONE ENCOUNTER
Nurse - Even though her bottle says 4 times/day, my question is what does she USUALLY take, 4/day or 3/day or just on occasion, or ??

## 2019-04-09 RX ORDER — METHOCARBAMOL 500 MG/1
TABLET, FILM COATED ORAL
Qty: 360 TABLET | Refills: 1 | Status: SHIPPED | OUTPATIENT
Start: 2019-04-09 | End: 2019-10-17 | Stop reason: SDUPTHER

## 2019-04-11 ENCOUNTER — TELEPHONE (OUTPATIENT)
Dept: FAMILY MEDICINE CLINIC | Age: 69
End: 2019-04-11

## 2019-04-11 DIAGNOSIS — F41.9 ANXIETY: ICD-10-CM

## 2019-04-11 RX ORDER — CLONAZEPAM 1 MG/1
TABLET ORAL
Qty: 60 TABLET | Refills: 0 | OUTPATIENT
Start: 2019-04-11 | End: 2019-05-08 | Stop reason: SDUPTHER

## 2019-04-11 NOTE — TELEPHONE ENCOUNTER
Oleg Cole [phone] (home)    is requesting refill(s) of medication Klonopin to preferred pharmacy [facility]    Last OV 1/14/19 (pertaining to medication)   Last refill 3/14/19 (per medication requested)  Next office visit scheduled or attempted Yes  Date 7/16/19  If No, reason

## 2019-04-11 NOTE — TELEPHONE ENCOUNTER
Marlene Leroy [phone] (home)    is requesting refill(s) of medication clonazePAM (KLONOPIN) 1 MG tablet      [facility] [address] [place] [address]    Last OV 1/14/19 (pertaining to medication)   Last refill 3/14/19 (per medication requested)  Next office visit scheduled or attempted Yes  Date 7/16/19  If No, reason     Pt states that she will be out of her medication Sunday and would like to  medication tomorrow.  Please call and

## 2019-04-12 RX ORDER — CLONAZEPAM 1 MG/1
TABLET ORAL
Qty: 60 TABLET | Refills: 0 | OUTPATIENT
Start: 2019-04-12

## 2019-04-12 NOTE — TELEPHONE ENCOUNTER
Pt called back. She stated that she has tried tizanidine before and it didn't work for her. She would like to continue with the robaxin.  [place] call pt with any question [phone]    Pt also stated that she cannot  Klonopin until Monday

## 2019-04-12 NOTE — TELEPHONE ENCOUNTER
Nurse - I forgot that we did send this Rx to her [facility] on 4/9/19 - please call them and verify/ask them if PA is actually needed (??).

## 2019-04-12 NOTE — TELEPHONE ENCOUNTER
Dane Plascencia [phone] (home)    is requesting refill(s) of medication methocarbamol (ROBAXIN) 500 MG tablet      to preferred pharmacy [facility] 1/14/19 (pertaining to medication)   Last refill 4/9/19 (per medication requested)  Next office visit scheduled or attempted yes  Date 7/16/19    Pt called in stating Ronnie needs a pre-authorization for this medication.  Please advise

## 2019-04-12 NOTE — TELEPHONE ENCOUNTER
Also - has she picked up the Klonopin yet from her pharmacy (as it was called in y-day).     [I cancelled this Klonopin request.]

## 2019-04-12 NOTE — TELEPHONE ENCOUNTER
Nurse - Please ask patient if she has ever used or tried the muscle relaxant \"tizanidine,\" generic for Zanaflex, as THIS is the usual muscle relaxant recommended/preferred for patients 65 and above - if not, then would suggest we/she try it, INSTEAD of the Robaxin, as it will be a high likelihood she will need to try it BEFORE they will likely authorize a Rx of the Robaxin - is just my guess. If she doesn't want to try the tizanidine first, will then try to refill the Robaxin, then will need to wait on pharmacy to contact US to see if truly need for PA.

## 2019-05-03 ENCOUNTER — OFFICE VISIT (OUTPATIENT)
Dept: FAMILY MEDICINE CLINIC | Age: 69
End: 2019-05-03
Payer: COMMERCIAL

## 2019-05-03 ENCOUNTER — TELEPHONE (OUTPATIENT)
Dept: FAMILY MEDICINE CLINIC | Age: 69
End: 2019-05-03

## 2019-05-03 VITALS
DIASTOLIC BLOOD PRESSURE: 64 MMHG | TEMPERATURE: 97.8 F | HEART RATE: 75 BPM | OXYGEN SATURATION: 98 % | BODY MASS INDEX: 34.88 KG/M2 | WEIGHT: 203.2 LBS | SYSTOLIC BLOOD PRESSURE: 108 MMHG

## 2019-05-03 DIAGNOSIS — N30.01 ACUTE CYSTITIS WITH HEMATURIA: Primary | ICD-10-CM

## 2019-05-03 DIAGNOSIS — H43.399 VITREOUS FLOATERS, UNSPECIFIED LATERALITY: Primary | ICD-10-CM

## 2019-05-03 LAB
BILIRUBIN, POC: NEGATIVE
BLOOD URINE, POC: NORMAL
CLARITY, POC: NORMAL
COLOR, POC: YELLOW
GLUCOSE URINE, POC: NEGATIVE
KETONES, POC: NEGATIVE
LEUKOCYTE EST, POC: NEGATIVE
NITRITE, POC: NORMAL
PH, POC: 6
PROTEIN, POC: 30
SPECIFIC GRAVITY, POC: 1.02
UROBILINOGEN, POC: 0.2

## 2019-05-03 PROCEDURE — 99213 OFFICE O/P EST LOW 20 MIN: CPT | Performed by: NURSE PRACTITIONER

## 2019-05-03 PROCEDURE — 81002 URINALYSIS NONAUTO W/O SCOPE: CPT | Performed by: NURSE PRACTITIONER

## 2019-05-03 RX ORDER — PHENAZOPYRIDINE HYDROCHLORIDE 100 MG/1
100 TABLET, FILM COATED ORAL 3 TIMES DAILY PRN
Qty: 6 TABLET | Refills: 0 | Status: SHIPPED | OUTPATIENT
Start: 2019-05-03 | End: 2019-09-16

## 2019-05-03 RX ORDER — NITROFURANTOIN 25; 75 MG/1; MG/1
100 CAPSULE ORAL 2 TIMES DAILY
Qty: 14 CAPSULE | Refills: 0 | Status: SHIPPED | OUTPATIENT
Start: 2019-05-03 | End: 2019-09-16 | Stop reason: SDUPTHER

## 2019-05-03 ASSESSMENT — ENCOUNTER SYMPTOMS
EYE DISCHARGE: 0
SHORTNESS OF BREATH: 0
BACK PAIN: 0
TROUBLE SWALLOWING: 0
COLOR CHANGE: 0
SINUS PRESSURE: 0
SINUS PAIN: 0
ABDOMINAL PAIN: 0
NAUSEA: 0
SORE THROAT: 0
COUGH: 0
CHEST TIGHTNESS: 0

## 2019-05-03 NOTE — PROGRESS NOTES
5/3/2019     Sarah Bond (:  1950) is a 81 y.o. female, here for evaluation of the following medical concerns: She is here today with urinary pain and cloudy. She has not taken any medications for the UTI. She has had diarrhea. Urinary Tract Infection    This is a new problem. The current episode started today. The problem occurs every urination. The problem has been gradually worsening. The quality of the pain is described as aching and burning. The pain is at a severity of 4/10. The pain is mild. There has been no fever. She is not sexually active. There is no history of pyelonephritis. Associated symptoms include frequency, hesitancy and urgency. Pertinent negatives include no chills, discharge, flank pain, hematuria, nausea, possible pregnancy or sweats. She has tried increased fluids for the symptoms. The treatment provided no relief. Review of Systems   Constitutional: Negative for appetite change, chills, fatigue and fever. HENT: Negative for congestion, ear pain, postnasal drip, sinus pressure, sinus pain, sore throat and trouble swallowing. Eyes: Negative for discharge. Respiratory: Negative for cough, chest tightness and shortness of breath. Cardiovascular: Negative for chest pain, palpitations and leg swelling. Gastrointestinal: Negative for abdominal pain and nausea. Endocrine: Negative for cold intolerance, heat intolerance and polyuria. Genitourinary: Positive for frequency, hesitancy and urgency. Negative for flank pain and hematuria. Musculoskeletal: Negative for back pain and gait problem. Skin: Negative for color change and rash. Allergic/Immunologic: Negative for environmental allergies, food allergies and immunocompromised state. Neurological: Negative for dizziness, weakness and headaches. Hematological: Does not bruise/bleed easily. Psychiatric/Behavioral: Negative for confusion and sleep disturbance. The patient is not nervous/anxious. Prior to Visit Medications    Medication Sig Taking? Authorizing Provider   phenazopyridine (PYRIDIUM) 100 MG tablet Take 1 tablet by mouth 3 times daily as needed for Pain Take after meals Yes RUSLAN Dove CNP   nitrofurantoin, macrocrystal-monohydrate, (MACROBID) 100 MG capsule Take 1 capsule by mouth 2 times daily for 7 days Yes RUSLAN Dove CNP   clonazePAM (KLONOPIN) 1 MG tablet TAKE 1 TABLET BY MOUTH TWICE DAILY AS NEEDED. MUST LAST 30 DAYS  Riley BRADFORD Hernandez MD   methocarbamol (ROBAXIN) 500 MG tablet TAKE 1 TABLET FOUR TIMES DAILY AS NEEDED for PAIN/SPASMS. Katerine Yoder MD   ibuprofen (ADVIL;MOTRIN) 400 MG tablet Take 1-2 tablets by mouth every 8 hours as needed for Pain . Take with food, and use sparingly.   Katerine Yoder MD   ibuprofen (ADVIL;MOTRIN) 800 MG tablet TAKE 1 TABLET EVERY 8 HOURS AS NEEDED FOR PAIN  Land Elieser Hernandez MD   levothyroxine (SYNTHROID) 100 MCG tablet TAKE 1 TABLET EVERY DAY  Riley BRADFORD Hernandez MD   tolterodine (DETROL LA) 4 MG extended release capsule TAKE 1 CAPSULE EVERY DAY  Riley BRADFORD Hernandez MD   diphenhydrAMINE (BENADRYL) 25 MG tablet Take 25 mg by mouth every 6 hours as needed for Itching  Historical Provider, MD   folic acid (FOLVITE) 415 MCG tablet Take 400 mcg by mouth daily  Historical Provider, MD   Zinc 50 MG TABS Take by mouth  Historical Provider, MD   Ascorbic Acid (VITAMIN C) 1000 MG tablet Take 1,000 mg by mouth daily  Historical Provider, MD   Calcium Carbonate Antacid 1000 MG CHEW Take by mouth  Historical Provider, MD BURKOLIN  (90 Base) MCG/ACT inhaler INHALE 2 PUFFS EVERY 6 HOURS AS NEEDED FOR WHEEZING  RUSLAN Ramsey CNP   esomeprazole (NEXIUM) 20 MG delayed release capsule Take 1 capsule by mouth daily  RUSLAN Martinez CNP   Glucos-MSM-C-Nb-Qxnvlw-Ijgjbq (BL MSM GLUCOSAMINE COMPLEX PO) Take by mouth  Historical Provider, MD   montelukast (SINGULAIR) 10 MG tablet TAKE 1 TABLET EVERY NIGHT RUSLAN Davis - CNP   [employer] VITAMIN D3 1000 units CAPS TAKE 1 SOFTGEL BY MOUTH DAILY  RUSLAN Ramsey CNP   imiquimod (ALDARA) 5 % cream Apply topically three times a week Indications: [facility] ordering Apply topically three times a week. RUSLAN Tobias CNP   Coenzyme Q10 200 MG CAPS Take 1 tablet by mouth daily  Beltran MylarRUSLAN CNP   Psyllium (METAMUCIL) 28.3 % POWD Take 1 each by mouth daily  Historical Provider, MD   lactase (LACTAID) 3000 UNITS tablet Take 1 tablet by mouth 3 times daily (with meals)  ANJUM Masterson MD   cetirizine (ZYRTEC ALLERGY) 10 MG tablet Take 1 tablet by mouth daily  Aubrey Romano MD   hydrocortisone (ANUSOL-HC) 2.5 % rectal cream Place rectally 2 times daily. RUSLAN Tobias CNP   acetaminophen (TYLENOL) 325 MG tablet Take 650 mg by mouth every 6 hours as needed for Pain  Historical Provider, MD   tolterodine (DETROL LA) 4 MG ER capsule Take 1 capsule by mouth daily. Aubrey Romano MD        Social History     Tobacco Use    Smoking status: Former Smoker     Packs/day: 1.00     Years: 35.00     Pack years: 35.00     Types: Cigarettes     Last attempt to quit: 2010     Years since quittin.3    Smokeless tobacco: Never Used   Substance Use Topics    Alcohol use: No     Alcohol/week: 0.0 oz        Vitals:    19 0925   BP: 108/64   Site: Left Upper Arm   Pulse: 75   Temp: 97.8 °F (36.6 °C)   TempSrc: Oral   SpO2: 98%   Weight: 203 lb 3.2 oz (92.2 kg)     Estimated body mass index is 34.88 kg/m² as calculated from the following:    Height as of 19: 5' 4\" (1.626 m). Weight as of this encounter: 203 lb 3.2 oz (92.2 kg). Physical Exam   Constitutional: She is oriented to person, place, and time. Vital signs are normal. She appears well-developed and well-nourished. No distress. HENT:   Head: Normocephalic and atraumatic.    Right Ear: External ear normal.   Left Ear: External ear normal.   Nose: Nose normal. Mouth/Throat: Oropharynx is clear and moist.   Eyes: Pupils are equal, round, and reactive to light. Conjunctivae and EOM are normal. Right eye exhibits no discharge. Left eye exhibits no discharge. Neck: Normal range of motion. Neck supple. Cardiovascular: Normal rate, regular rhythm and normal heart sounds. Pulmonary/Chest: Effort normal and breath sounds normal. No respiratory distress. Abdominal: Soft. Bowel sounds are normal.   Musculoskeletal: Normal range of motion. She exhibits no deformity. Neurological: She is alert and oriented to person, place, and time. Skin: Skin is warm and dry. Psychiatric: She has a normal mood and affect. Her behavior is normal. Judgment and thought content normal.   Vitals reviewed. ASSESSMENT/PLAN:  Terra Khanna was seen today for dysuria. Diagnoses and all orders for this visit:    Acute cystitis with hematuria  -     phenazopyridine (PYRIDIUM) 100 MG tablet; Take 1 tablet by mouth 3 times daily as needed for Pain Take after meals  -     nitrofurantoin, macrocrystal-monohydrate, (MACROBID) 100 MG capsule; Take 1 capsule by mouth 2 times daily for 7 days  -     POCT Urinalysis no Micro  -     URINE CULTURE    Return if symptoms worsen or fail to improve. An electronic signature was used to authenticate this note. --RHONDA Murillo on 5/3/2019 at10:17 AM

## 2019-05-06 LAB
ORGANISM: ABNORMAL
URINE CULTURE, ROUTINE: ABNORMAL
URINE CULTURE, ROUTINE: ABNORMAL

## 2019-05-07 ENCOUNTER — TELEPHONE (OUTPATIENT)
Dept: FAMILY MEDICINE CLINIC | Age: 69
End: 2019-05-07

## 2019-05-07 NOTE — TELEPHONE ENCOUNTER
Patient is currently taking medication for UTI and is experiencing constipation. Patient would like to discuss this along with other anti acid meds she stopped taking.

## 2019-05-07 NOTE — TELEPHONE ENCOUNTER
I spoke with the pt, she states that she has been taking Nitrofurantoin for the UTI she has. She states that she has been using tums and gas-x for her IBS as well as metamucil. She thinks that the constipation is being caused byt the Nitrofurantoin. She states that on Sunday evening she felt like she had to go to the bathroom and had to \"dig out\" her feces and then she felt a little better. She states that she has had a lot of bloating as well.

## 2019-05-07 NOTE — TELEPHONE ENCOUNTER
Nurse - Consider adding (generic) Miralax until finished with the abx - should add water to stool to make it much softer, and main down side is that it is a little pricey, and sometimes takes 1-2 days to have it's full effects.

## 2019-05-08 ENCOUNTER — TELEPHONE (OUTPATIENT)
Dept: FAMILY MEDICINE CLINIC | Age: 69
End: 2019-05-08

## 2019-05-08 DIAGNOSIS — F41.9 ANXIETY: ICD-10-CM

## 2019-05-08 NOTE — TELEPHONE ENCOUNTER
[place] [facility] [phone] (home)    is requesting refill(s) of medication clonazePAM (KLONOPIN) 1 MG tablet      to preferred pharmacy [address] [address] Lillian Bing Andrew, [place] [facility] [address] [phone]         Last OV 1/14/19 (pertaining to medication)   Last refill 4/11/19 (per medication requested)  Next office visit scheduled or attempted Yes  Date 7/16/19  If No, reason MADE

## 2019-05-09 RX ORDER — CLONAZEPAM 1 MG/1
TABLET ORAL
Qty: 60 TABLET | Refills: 0 | OUTPATIENT
Start: 2019-05-09 | End: 2019-06-04 | Stop reason: SDUPTHER

## 2019-05-09 RX ORDER — CLONAZEPAM 1 MG/1
TABLET ORAL
Qty: 60 TABLET | Refills: 0 | OUTPATIENT
Start: 2019-05-09 | End: 2019-05-09 | Stop reason: SDUPTHER

## 2019-05-09 NOTE — TELEPHONE ENCOUNTER
Noted.      Nurse - Please call in refill for pt's Klonopin to her local pharmacy as requested. (I just ok'd for [facility] [address].         (OARRS was run and reviewed personally by me, Radha Hernandez, on 05/09/19, and no signs or suspicion of diversion or other abuse.)

## 2019-05-16 ENCOUNTER — NURSE ONLY (OUTPATIENT)
Dept: FAMILY MEDICINE CLINIC | Age: 69
End: 2019-05-16
Payer: COMMERCIAL

## 2019-05-16 ENCOUNTER — TELEPHONE (OUTPATIENT)
Dept: FAMILY MEDICINE CLINIC | Age: 69
End: 2019-05-16

## 2019-05-16 DIAGNOSIS — Z09 FOLLOW UP: Primary | ICD-10-CM

## 2019-05-16 LAB
BILIRUBIN, POC: 0
BLOOD URINE, POC: 0
CLARITY, POC: CLEAR
COLOR, POC: YELLOW
GLUCOSE URINE, POC: 0
KETONES, POC: 0
LEUKOCYTE EST, POC: NORMAL
NITRITE, POC: 0
PH, POC: 5.5
PROTEIN, POC: 0
SPECIFIC GRAVITY, POC: 1.02
UROBILINOGEN, POC: 0.2

## 2019-05-16 PROCEDURE — 81002 URINALYSIS NONAUTO W/O SCOPE: CPT | Performed by: FAMILY MEDICINE

## 2019-05-23 ENCOUNTER — TELEPHONE (OUTPATIENT)
Dept: FAMILY MEDICINE CLINIC | Age: 69
End: 2019-05-23

## 2019-05-23 DIAGNOSIS — Z23 NEED FOR MEASLES-MUMPS-RUBELLA (MMR) VACCINE: Primary | ICD-10-CM

## 2019-05-23 NOTE — TELEPHONE ENCOUNTER
Patient is unsure if she's had a measles vaccine as a child and would like to know if she can get one due to the recent measles outbreak

## 2019-05-23 NOTE — TELEPHONE ENCOUNTER
Noted - Agree since Medicare, would likely need to go to local pharmacy, may/may not be covered - not presently being done/recommended locally to my knowledge.    (MMR order canceled.)

## 2019-05-23 NOTE — TELEPHONE ENCOUNTER
Yes, she can - I ordered it for her (though can't say for sure will be covered by insurance, though suspect will be covered).

## 2019-05-30 NOTE — TELEPHONE ENCOUNTER
Louisa West is requesting refill(s) ibuprofen  Last OV 1/14/19 (pertaining to medication)  LR 3/29/19 (per medication requested)  Next office visit scheduled or attempted Yes   If no, reason:  Pt is scheduled for 7/16/19

## 2019-05-31 RX ORDER — IBUPROFEN 400 MG/1
TABLET ORAL
Qty: 240 TABLET | Refills: 1 | Status: SHIPPED | OUTPATIENT
Start: 2019-05-31 | End: 2019-10-16 | Stop reason: SDUPTHER

## 2019-06-04 DIAGNOSIS — F41.9 ANXIETY: ICD-10-CM

## 2019-06-05 NOTE — TELEPHONE ENCOUNTER
Teressa Hurley is requesting refill(s) clonazepam  Last OV 1/14/19 (pertaining to medication)  LR 5/9/19 (per medication requested)  Next office visit scheduled or attempted Yes   If no, reason:  Pt is scheduled for 7/16/19

## 2019-06-06 RX ORDER — CLONAZEPAM 1 MG/1
TABLET ORAL
Qty: 60 TABLET | Refills: 0 | OUTPATIENT
Start: 2019-06-06 | End: 2019-07-08 | Stop reason: SDUPTHER

## 2019-06-10 ENCOUNTER — TELEPHONE (OUTPATIENT)
Dept: FAMILY MEDICINE CLINIC | Age: 69
End: 2019-06-10

## 2019-07-08 DIAGNOSIS — F41.9 ANXIETY: ICD-10-CM

## 2019-07-10 RX ORDER — CLONAZEPAM 1 MG/1
TABLET ORAL
Qty: 60 TABLET | Refills: 0 | OUTPATIENT
Start: 2019-07-10 | End: 2019-08-10 | Stop reason: SDUPTHER

## 2019-07-16 ENCOUNTER — OFFICE VISIT (OUTPATIENT)
Dept: FAMILY MEDICINE CLINIC | Age: 69
End: 2019-07-16
Payer: COMMERCIAL

## 2019-07-16 VITALS
HEART RATE: 67 BPM | WEIGHT: 205.2 LBS | HEIGHT: 64 IN | OXYGEN SATURATION: 96 % | SYSTOLIC BLOOD PRESSURE: 104 MMHG | DIASTOLIC BLOOD PRESSURE: 60 MMHG | BODY MASS INDEX: 35.03 KG/M2

## 2019-07-16 DIAGNOSIS — G89.29 CHRONIC LEFT-SIDED LOW BACK PAIN WITHOUT SCIATICA: ICD-10-CM

## 2019-07-16 DIAGNOSIS — E78.2 MIXED HYPERLIPIDEMIA: ICD-10-CM

## 2019-07-16 DIAGNOSIS — F41.9 ANXIETY: Primary | ICD-10-CM

## 2019-07-16 DIAGNOSIS — E03.9 ACQUIRED HYPOTHYROIDISM: ICD-10-CM

## 2019-07-16 DIAGNOSIS — R73.09 ELEVATED GLUCOSE: ICD-10-CM

## 2019-07-16 DIAGNOSIS — Z20.5 EXPOSURE TO HEPATITIS A: ICD-10-CM

## 2019-07-16 DIAGNOSIS — K21.9 GASTROESOPHAGEAL REFLUX DISEASE WITHOUT ESOPHAGITIS: ICD-10-CM

## 2019-07-16 DIAGNOSIS — M54.50 CHRONIC LEFT-SIDED LOW BACK PAIN WITHOUT SCIATICA: ICD-10-CM

## 2019-07-16 LAB
A/G RATIO: 1.8 (ref 1.1–2.2)
ALBUMIN SERPL-MCNC: 4.7 G/DL (ref 3.4–5)
ALP BLD-CCNC: 55 U/L (ref 40–129)
ALT SERPL-CCNC: 25 U/L (ref 10–40)
ANION GAP SERPL CALCULATED.3IONS-SCNC: 16 MMOL/L (ref 3–16)
AST SERPL-CCNC: 27 U/L (ref 15–37)
BILIRUB SERPL-MCNC: 0.3 MG/DL (ref 0–1)
BUN BLDV-MCNC: 19 MG/DL (ref 7–20)
CALCIUM SERPL-MCNC: 10 MG/DL (ref 8.3–10.6)
CHLORIDE BLD-SCNC: 99 MMOL/L (ref 99–110)
CHOLESTEROL, TOTAL: 210 MG/DL (ref 0–199)
CO2: 28 MMOL/L (ref 21–32)
CREAT SERPL-MCNC: 0.8 MG/DL (ref 0.6–1.2)
GFR AFRICAN AMERICAN: >60
GFR NON-AFRICAN AMERICAN: >60
GLOBULIN: 2.6 G/DL
GLUCOSE BLD-MCNC: 107 MG/DL (ref 70–99)
HAV IGM SER IA-ACNC: NORMAL
HDLC SERPL-MCNC: 63 MG/DL (ref 40–60)
LDL CHOLESTEROL CALCULATED: 112 MG/DL
POTASSIUM SERPL-SCNC: 4.5 MMOL/L (ref 3.5–5.1)
SODIUM BLD-SCNC: 143 MMOL/L (ref 136–145)
T4 FREE: 1.5 NG/DL (ref 0.9–1.8)
TOTAL PROTEIN: 7.3 G/DL (ref 6.4–8.2)
TRIGL SERPL-MCNC: 177 MG/DL (ref 0–150)
TSH SERPL DL<=0.05 MIU/L-ACNC: 2.23 UIU/ML (ref 0.27–4.2)
VLDLC SERPL CALC-MCNC: 35 MG/DL

## 2019-07-16 PROCEDURE — 36415 COLL VENOUS BLD VENIPUNCTURE: CPT | Performed by: FAMILY MEDICINE

## 2019-07-16 PROCEDURE — 99214 OFFICE O/P EST MOD 30 MIN: CPT | Performed by: FAMILY MEDICINE

## 2019-07-16 RX ORDER — DULOXETIN HYDROCHLORIDE 30 MG/1
30 CAPSULE, DELAYED RELEASE ORAL DAILY
Qty: 30 CAPSULE | Refills: 1 | Status: SHIPPED | OUTPATIENT
Start: 2019-07-16 | End: 2019-09-16

## 2019-07-16 NOTE — PROGRESS NOTES
- will try Cymbalta as prescribed. Advise low-fat and low sweets diet, also try to stay as active as possible physically. If labs stable, and overall doing okay, then repeat office visit in 6 months, sooner as needed. Length of visit over 25 minutes, and >50% of time spent counseling and coordinating care.           Anastacia Bunch MD

## 2019-07-17 LAB
ESTIMATED AVERAGE GLUCOSE: 108.3 MG/DL
HBA1C MFR BLD: 5.4 %

## 2019-07-29 ASSESSMENT — ENCOUNTER SYMPTOMS
SHORTNESS OF BREATH: 0
BACK PAIN: 1
ABDOMINAL PAIN: 0

## 2019-08-02 ENCOUNTER — TELEPHONE (OUTPATIENT)
Dept: FAMILY MEDICINE CLINIC | Age: 69
End: 2019-08-02

## 2019-08-02 NOTE — TELEPHONE ENCOUNTER
Patient calling for lab results from 7/16/19 no result notes found. Please call patient ASAP with results.

## 2019-08-10 DIAGNOSIS — F41.9 ANXIETY: ICD-10-CM

## 2019-08-13 RX ORDER — CLONAZEPAM 1 MG/1
TABLET ORAL
Qty: 60 TABLET | Refills: 0 | OUTPATIENT
Start: 2019-08-13 | End: 2019-09-09 | Stop reason: SDUPTHER

## 2019-08-22 ENCOUNTER — TELEPHONE (OUTPATIENT)
Dept: FAMILY MEDICINE CLINIC | Age: 69
End: 2019-08-22

## 2019-08-22 DIAGNOSIS — N32.81 OAB (OVERACTIVE BLADDER): Primary | ICD-10-CM

## 2019-08-27 RX ORDER — OXYBUTYNIN CHLORIDE 5 MG/1
2.5-5 TABLET ORAL 3 TIMES DAILY PRN
Qty: 60 TABLET | Refills: 5 | Status: SHIPPED | OUTPATIENT
Start: 2019-08-27 | End: 2019-10-01 | Stop reason: SDUPTHER

## 2019-08-27 NOTE — TELEPHONE ENCOUNTER
Nurse - Please tell patient I sent a Rx for the oxybutynin 5mg tablets to the [facility] [address] - even though she's been on the 5mg in the past, is recommended to start at the 2.5mg/dose level, and if not very helpful, and side effects are manageable, then to go up to 5mg/dose as needed.      (If she wants to stay on it, THEN for next Rx could sent to [address] [place] [address] instead of local.)

## 2019-09-09 DIAGNOSIS — F41.9 ANXIETY: ICD-10-CM

## 2019-09-11 RX ORDER — CLONAZEPAM 1 MG/1
TABLET ORAL
Qty: 60 TABLET | Refills: 0 | OUTPATIENT
Start: 2019-09-11 | End: 2019-10-07 | Stop reason: SDUPTHER

## 2019-09-16 ENCOUNTER — OFFICE VISIT (OUTPATIENT)
Dept: FAMILY MEDICINE CLINIC | Age: 69
End: 2019-09-16
Payer: COMMERCIAL

## 2019-09-16 VITALS
HEIGHT: 64 IN | DIASTOLIC BLOOD PRESSURE: 68 MMHG | SYSTOLIC BLOOD PRESSURE: 104 MMHG | OXYGEN SATURATION: 98 % | BODY MASS INDEX: 34.01 KG/M2 | HEART RATE: 63 BPM | WEIGHT: 199.2 LBS

## 2019-09-16 DIAGNOSIS — R30.0 DYSURIA: ICD-10-CM

## 2019-09-16 DIAGNOSIS — N30.01 ACUTE CYSTITIS WITH HEMATURIA: Primary | ICD-10-CM

## 2019-09-16 LAB
BILIRUBIN, POC: NEGATIVE
BLOOD URINE, POC: NORMAL
CLARITY, POC: NORMAL
COLOR, POC: NORMAL
GLUCOSE URINE, POC: NEGATIVE
KETONES, POC: NEGATIVE
LEUKOCYTE EST, POC: NORMAL
NITRITE, POC: POSITIVE
PH, POC: 6
PROTEIN, POC: NEGATIVE
SPECIFIC GRAVITY, POC: 1.01
UROBILINOGEN, POC: 0.2

## 2019-09-16 PROCEDURE — 81002 URINALYSIS NONAUTO W/O SCOPE: CPT | Performed by: PHYSICIAN ASSISTANT

## 2019-09-16 PROCEDURE — 99213 OFFICE O/P EST LOW 20 MIN: CPT | Performed by: PHYSICIAN ASSISTANT

## 2019-09-16 RX ORDER — ESTRADIOL 10 UG/1
INSERT VAGINAL
COMMUNITY
Start: 2019-09-04

## 2019-09-16 RX ORDER — NITROFURANTOIN 25; 75 MG/1; MG/1
100 CAPSULE ORAL 2 TIMES DAILY
Qty: 14 CAPSULE | Refills: 0 | Status: SHIPPED | OUTPATIENT
Start: 2019-09-16 | End: 2019-09-23

## 2019-09-16 NOTE — PROGRESS NOTES
SUBJECTIVE: Richelle Beltran is a 81 y.o. female who complains of urinary frequency, urgency x 3 days, without flank pain, fever, chills, or abnormal vaginal discharge or bleeding. OBJECTIVE: Appears well, in no apparent distress. Vital signs are normal. The abdomen is soft without tenderness, guarding, mass, rebound or organomegaly. No CVA tenderness or inguinal adenopathy noted. Urine dipstick shows positive for RBC's, positive for nitrates and positive for leukocytes. ASSESSMENT: UTI uncomplicated without evidence of pyelonephritis    PLAN: Treatment per orders - also push fluids, may use Pyridium OTC prn. Call or return to clinic prn if these symptoms worsen or fail to improve as anticipated.

## 2019-09-30 DIAGNOSIS — N32.81 OAB (OVERACTIVE BLADDER): ICD-10-CM

## 2019-10-01 RX ORDER — OXYBUTYNIN CHLORIDE 5 MG/1
2.5-5 TABLET ORAL 3 TIMES DAILY PRN
Qty: 90 TABLET | Refills: 1 | Status: SHIPPED | OUTPATIENT
Start: 2019-10-01 | End: 2019-11-18 | Stop reason: SDUPTHER

## 2019-10-04 ENCOUNTER — HOSPITAL ENCOUNTER (OUTPATIENT)
Dept: WOMENS IMAGING | Age: 69
Discharge: HOME | End: 2019-10-04
Payer: COMMERCIAL

## 2019-10-04 DIAGNOSIS — Z12.31 ENCOUNTER FOR SCREENING MAMMOGRAM FOR BREAST CANCER: ICD-10-CM

## 2019-10-04 PROCEDURE — 77063 BREAST TOMOSYNTHESIS BI: CPT

## 2019-10-09 ENCOUNTER — TELEPHONE (OUTPATIENT)
Dept: FAMILY MEDICINE CLINIC | Age: 69
End: 2019-10-09

## 2019-10-09 NOTE — TELEPHONE ENCOUNTER
Patient returned call regarding mammogram results. I read her the physician notes, she understood, no questions.

## 2019-10-17 RX ORDER — LEVOTHYROXINE SODIUM 0.1 MG/1
TABLET ORAL
Qty: 90 TABLET | Refills: 1 | OUTPATIENT
Start: 2019-10-17

## 2019-10-17 RX ORDER — IBUPROFEN 400 MG/1
TABLET ORAL
Qty: 480 TABLET | OUTPATIENT
Start: 2019-10-17

## 2019-10-17 RX ORDER — METHOCARBAMOL 500 MG/1
TABLET, FILM COATED ORAL
Qty: 360 TABLET | Refills: 1 | OUTPATIENT
Start: 2019-10-17

## 2019-10-21 DIAGNOSIS — E78.2 MIXED HYPERLIPIDEMIA: ICD-10-CM

## 2019-10-21 RX ORDER — IBUPROFEN 400 MG/1
TABLET ORAL
Qty: 240 TABLET | Refills: 1 | Status: SHIPPED | OUTPATIENT
Start: 2019-10-21 | End: 2019-11-18 | Stop reason: SDUPTHER

## 2019-10-21 RX ORDER — METHOCARBAMOL 500 MG/1
TABLET, FILM COATED ORAL
Qty: 360 TABLET | Refills: 1 | Status: SHIPPED | OUTPATIENT
Start: 2019-10-21 | End: 2020-03-12

## 2019-10-21 RX ORDER — LEVOTHYROXINE SODIUM 0.1 MG/1
TABLET ORAL
Qty: 90 TABLET | Refills: 1 | Status: SHIPPED | OUTPATIENT
Start: 2019-10-21 | End: 2020-01-06 | Stop reason: SDUPTHER

## 2019-10-23 RX ORDER — UBIDECARENONE 200 MG
1 CAPSULE ORAL DAILY
Qty: 90 CAPSULE | Refills: 3 | Status: SHIPPED | OUTPATIENT
Start: 2019-10-23

## 2019-10-30 ENCOUNTER — TELEPHONE (OUTPATIENT)
Dept: FAMILY MEDICINE CLINIC | Age: 69
End: 2019-10-30

## 2019-10-30 DIAGNOSIS — J45.20 MILD INTERMITTENT ASTHMA WITHOUT COMPLICATION: ICD-10-CM

## 2019-10-31 RX ORDER — MONTELUKAST SODIUM 10 MG/1
10 TABLET ORAL NIGHTLY
Qty: 90 TABLET | Refills: 3 | Status: SHIPPED | OUTPATIENT
Start: 2019-10-31 | End: 2020-01-03 | Stop reason: SDUPTHER

## 2019-11-05 ENCOUNTER — OFFICE VISIT (OUTPATIENT)
Dept: FAMILY MEDICINE CLINIC | Age: 69
End: 2019-11-05
Payer: COMMERCIAL

## 2019-11-05 VITALS
SYSTOLIC BLOOD PRESSURE: 118 MMHG | HEART RATE: 69 BPM | WEIGHT: 198 LBS | OXYGEN SATURATION: 98 % | BODY MASS INDEX: 33.99 KG/M2 | DIASTOLIC BLOOD PRESSURE: 62 MMHG

## 2019-11-05 DIAGNOSIS — R30.0 DYSURIA: Primary | ICD-10-CM

## 2019-11-05 DIAGNOSIS — F41.9 ANXIETY: ICD-10-CM

## 2019-11-05 LAB
BILIRUBIN, POC: NEGATIVE
BLOOD URINE, POC: NEGATIVE
CLARITY, POC: NORMAL
COLOR, POC: NORMAL
GLUCOSE URINE, POC: NEGATIVE
KETONES, POC: NEGATIVE
LEUKOCYTE EST, POC: NORMAL
NITRITE, POC: NEGATIVE
PH, POC: 5
PROTEIN, POC: NEGATIVE
SPECIFIC GRAVITY, POC: 1.01
UROBILINOGEN, POC: 0.2

## 2019-11-05 PROCEDURE — 81002 URINALYSIS NONAUTO W/O SCOPE: CPT | Performed by: PHYSICIAN ASSISTANT

## 2019-11-05 PROCEDURE — 99213 OFFICE O/P EST LOW 20 MIN: CPT | Performed by: PHYSICIAN ASSISTANT

## 2019-11-06 RX ORDER — CLONAZEPAM 1 MG/1
TABLET ORAL
Qty: 60 TABLET | Refills: 0 | OUTPATIENT
Start: 2019-11-06 | End: 2019-12-06 | Stop reason: SDUPTHER

## 2019-11-07 LAB — URINE CULTURE, ROUTINE: NORMAL

## 2019-11-18 DIAGNOSIS — N32.81 OAB (OVERACTIVE BLADDER): ICD-10-CM

## 2019-11-18 RX ORDER — IBUPROFEN 400 MG/1
TABLET ORAL
Qty: 240 TABLET | Refills: 1 | Status: SHIPPED | OUTPATIENT
Start: 2019-11-18 | End: 2020-01-06 | Stop reason: SDUPTHER

## 2019-11-18 RX ORDER — OXYBUTYNIN CHLORIDE 5 MG/1
2.5-5 TABLET ORAL 3 TIMES DAILY PRN
Qty: 90 TABLET | Refills: 1 | Status: SHIPPED | OUTPATIENT
Start: 2019-11-18 | End: 2020-01-06 | Stop reason: SDUPTHER

## 2019-12-04 DIAGNOSIS — F41.9 ANXIETY: ICD-10-CM

## 2019-12-06 RX ORDER — CLONAZEPAM 1 MG/1
TABLET ORAL
Qty: 60 TABLET | Refills: 0 | OUTPATIENT
Start: 2019-12-06 | End: 2020-01-06 | Stop reason: SDUPTHER

## 2019-12-23 ENCOUNTER — TELEPHONE (OUTPATIENT)
Dept: FAMILY MEDICINE CLINIC | Age: 69
End: 2019-12-23

## 2019-12-23 NOTE — TELEPHONE ENCOUNTER
Spoke to pt and gave message. I told her if she had hives, rash, trouble breathing or any other problems she needed to be seen. She can't move rug. I told her to place a towel or blanket under her mat.  Just in case she allergic

## 2019-12-23 NOTE — TELEPHONE ENCOUNTER
Patient places her yoga mat on top of a wool rug to do her exercises. She would like to know if a wool rug would cause her to have an allergic reaction:  Itching, sneezing, etc.?  I asked if she had been tested, she said no, but could not wear wool as a child bc she would have a reaction.

## 2020-01-06 ENCOUNTER — OFFICE VISIT (OUTPATIENT)
Dept: FAMILY MEDICINE CLINIC | Age: 70
End: 2020-01-06
Payer: MEDICARE

## 2020-01-06 VITALS
SYSTOLIC BLOOD PRESSURE: 110 MMHG | DIASTOLIC BLOOD PRESSURE: 70 MMHG | HEIGHT: 64 IN | WEIGHT: 200.6 LBS | TEMPERATURE: 97.4 F | OXYGEN SATURATION: 97 % | RESPIRATION RATE: 18 BRPM | BODY MASS INDEX: 34.25 KG/M2 | HEART RATE: 57 BPM

## 2020-01-06 PROCEDURE — 99213 OFFICE O/P EST LOW 20 MIN: CPT | Performed by: PHYSICIAN ASSISTANT

## 2020-01-06 PROCEDURE — 81002 URINALYSIS NONAUTO W/O SCOPE: CPT | Performed by: PHYSICIAN ASSISTANT

## 2020-01-06 RX ORDER — TIZANIDINE 4 MG/1
4 TABLET ORAL NIGHTLY PRN
Qty: 90 TABLET | Refills: 1 | Status: SHIPPED | OUTPATIENT
Start: 2020-01-06 | End: 2020-03-12

## 2020-01-06 RX ORDER — LEVOTHYROXINE SODIUM 0.1 MG/1
TABLET ORAL
Qty: 90 TABLET | Refills: 1 | Status: SHIPPED | OUTPATIENT
Start: 2020-01-06 | End: 2020-07-15 | Stop reason: SDUPTHER

## 2020-01-06 RX ORDER — MONTELUKAST SODIUM 10 MG/1
10 TABLET ORAL NIGHTLY
Qty: 90 TABLET | Refills: 1 | Status: SHIPPED | OUTPATIENT
Start: 2020-01-06 | End: 2020-03-16

## 2020-01-06 RX ORDER — OXYBUTYNIN CHLORIDE 5 MG/1
2.5-5 TABLET ORAL 3 TIMES DAILY PRN
Qty: 90 TABLET | Refills: 1 | Status: SHIPPED | OUTPATIENT
Start: 2020-01-06 | End: 2020-07-15 | Stop reason: SDUPTHER

## 2020-01-06 RX ORDER — IBUPROFEN 400 MG/1
TABLET ORAL
Qty: 240 TABLET | Refills: 1 | Status: SHIPPED | OUTPATIENT
Start: 2020-01-06 | End: 2020-07-08 | Stop reason: SDUPTHER

## 2020-01-06 RX ORDER — CLONAZEPAM 1 MG/1
TABLET ORAL
Qty: 60 TABLET | Refills: 0 | Status: SHIPPED | OUTPATIENT
Start: 2020-01-06 | End: 2020-02-05 | Stop reason: SDUPTHER

## 2020-01-06 NOTE — TELEPHONE ENCOUNTER
Patient is out of her medications. Please fill.   They are mail order so it will take a while for her to get her meds

## 2020-01-06 NOTE — PROGRESS NOTES
[address] [facility]    CC:   Chief Complaint   Patient presents with    Urinary Tract Infection     Pt states she has UTI. She isn't making it to bathroom this morning        History of Present Illness:    Win Archibald is a 81 y.o. female who complains of awaking this morning with incontinence. Admits to drinking more water than usual late last night. Denies: Fever, chills, flank or suprapubic pains. No nausea. Appetite unchanged. does have a history of recurrent UTI. Denies history of pyelonephritis. Denies  GYN complaints including abnormal vaginal discharge or bleeding or burning. Denies GI complaints including nausea, bowel habit changes, melena, hematochezia. Review of Systems:  Remaining were reviewed and are unremarkable for other constitutional, EENT, cardiac, pulmonary, GI, , neurologic, musculoskeletal, or integumentary complaints. Past Medical, Surgical, Family, Social Histories : reviewed and updated    Medications: reviewed and updated    Allergies- reviewed and updated        Physical exam:      Vitals:    01/06/20 1153   BP: 110/70   Site: Right Upper Arm   Position: Sitting   Pulse: 57   Resp: 18   Temp: 97.4 °F (36.3 °C)   TempSrc: Oral   SpO2: 97%   Weight: 200 lb 9.6 oz (91 kg)   Height: 5' 4\" (1.626 m)     APPEARANCE: Pleasant, friendly, well-nourished. No acute distress. HEART: Reg rate and rhythm. No murmurs, rubs, or gallops. LUNGS: Clear to auscultation. No wheezes, rales, or rhonchi. ABDOMEN:  Soft, bowel sounds present,  non-tender, no masses or HSM. No CVAT. NEUROLOGIC: grossly non focal  SKIN: Warm, dry, normal turgor. Cap refill <3secs. No rashes, petechiae, purpura. Laboratory:   negative for all components   Urine culture Not indicated       Assessment/ Plan      1. Dysuria    2. Overactive bladder       Reassured. If unimproved, return to clinic. No orders of the defined types were placed in this encounter.

## 2020-01-15 ENCOUNTER — TELEPHONE (OUTPATIENT)
Dept: FAMILY MEDICINE CLINIC | Age: 70
End: 2020-01-15

## 2020-01-15 NOTE — TELEPHONE ENCOUNTER
Patient calling to ask if the Tizanidine at 4 mg will work as well as the Methocarbamol that she was taking at 500 mg ?

## 2020-01-16 NOTE — TELEPHONE ENCOUNTER
Nurse - it's really hard to say. Some people like it better, some don't, but the tizanidine is thought to be one of the safest muscle relaxants for anyone 72 or older - that's why tizanidine is usually recommended first line for Medicare-age patients. (Also - some people do take it more often than once/day, even though your Rx says to take only at night.   It can definitely cause drowsiness, also dry mouth - these are two of the most common side effects.)

## 2020-01-20 ENCOUNTER — TELEPHONE (OUTPATIENT)
Dept: FAMILY MEDICINE CLINIC | Age: 70
End: 2020-01-20

## 2020-01-20 NOTE — TELEPHONE ENCOUNTER
Patient is calling to see if there is anything that clinical recommends to take for head and chest congestion. Please call and advise.

## 2020-01-22 ENCOUNTER — OFFICE VISIT (OUTPATIENT)
Dept: FAMILY MEDICINE CLINIC | Age: 70
End: 2020-01-22
Payer: MEDICARE

## 2020-01-22 VITALS
HEART RATE: 79 BPM | DIASTOLIC BLOOD PRESSURE: 60 MMHG | BODY MASS INDEX: 33.53 KG/M2 | HEIGHT: 64 IN | TEMPERATURE: 98.1 F | OXYGEN SATURATION: 99 % | RESPIRATION RATE: 16 BRPM | WEIGHT: 196.4 LBS | SYSTOLIC BLOOD PRESSURE: 110 MMHG

## 2020-01-22 PROCEDURE — 99213 OFFICE O/P EST LOW 20 MIN: CPT | Performed by: PHYSICIAN ASSISTANT

## 2020-01-22 RX ORDER — GUAIFENESIN 600 MG/1
TABLET, EXTENDED RELEASE ORAL
Qty: 30 TABLET | Refills: 0 | Status: SHIPPED | OUTPATIENT
Start: 2020-01-22 | End: 2020-07-15

## 2020-01-22 RX ORDER — AZITHROMYCIN 250 MG/1
TABLET, FILM COATED ORAL
Qty: 1 PACKET | Refills: 0 | Status: SHIPPED | OUTPATIENT
Start: 2020-01-22 | End: 2020-03-12

## 2020-01-22 NOTE — PROGRESS NOTES
[address]    CC:    Chief Complaint   Patient presents with    Cough     pt states that she has had a cough, slightly sore throat, chest congestion since 1/8/20         HISTORY OF PRESENT ILLNESS:      Kayal Lane is a 78 y.o. female here as per chief complaint. Began 8 days ago with headache, ear symptoms, nasal congestion, facial pain/sinus pressure, sore throat and cough. Sick contacts are family members. Remedies tried are increased vit c, rest. No meds. Denies myalgias/arthralgias, purulent nasal discharge, shortness of breath, wheezing/chest tightness, rash, nausea/vomiting and neck pain. ROS:  Remaining 14 ROS were reviewed and are unremarkable for other constitutional, EENT, cardiac, pulmonary, GI, , neurologic, musculoskeletal, or integumentary complaints. Past Medical/Surgical/ Social HISTORY: Reviewed and updated. MEDICATIONS/ ALLERGIES:  Reviewed and updated. PHYSICAL EXAMINATION:  Vitals:    01/22/20 1050   BP: 110/60   Pulse: 79   Resp: 16   Temp: 98.1 °F (36.7 °C)   TempSrc: Oral   SpO2: 99%   Weight: 196 lb 6.4 oz (89.1 kg)   Height: 5' 4\" (1.626 m)     GENERAL APPEARANCE:  Well-nourished. Looks in no distress. HEAD: Normocephalic. Atraumatic. EYES:  EOMI, PERRLA. Conjunctivae pink and moist. Sclera white. EARS:  Negative pinna pull. Canals clear. TM's intact with inner ear clear fluid. No mastoid tenderness. NOSE : mucosa erythematous and swollen  MOUTH/THROAT:     without exudate, red, inflamed  NECK:  no adenopathy, thyroid normal to palpation  HEART:  Regular rate and rhythm. No murmurs, rubs, or gallops. LUNGS: no wheezes. No rales or rhonchi. Equal chest percussion. ABDOMEN:  Soft, non-tender. No masses. EXTREMITIES:  Moves all extremities. No edema  NEUROLOGIC: Grossly non focal.   SKIN: Warm, dry without rashes, petechia, or purpura. ADDITIONAL DATA:  Prior notes reviewed.   No results found for this visit on

## 2020-02-03 NOTE — TELEPHONE ENCOUNTER
Richard Magana [phone] (home)    is requesting refill(s) of medication clonazepam   to preferred pharmacy [facility] Yessi [facility].     Last OV 07-16-19 (pertaining to medication)   Last refill 01-06-20 (per medication requested)  Next office visit scheduled or attempted No  Date new PCP appt made for 3-12-20 with Dr. Richardson Case  If No, reason see above

## 2020-02-05 RX ORDER — CLONAZEPAM 1 MG/1
TABLET ORAL
Qty: 60 TABLET | Refills: 0 | Status: SHIPPED | OUTPATIENT
Start: 2020-02-05 | End: 2020-03-12 | Stop reason: SDUPTHER

## 2020-02-05 NOTE — TELEPHONE ENCOUNTER
Patient calling to check on status of script. Advised her it went to the pharmacy today. Advised her what Dr. Roman Turcios said. .. please let patient know this will be the last Rx of Klonopin she gets from me, and she needs to try to use it sparingly, to make it last until she sees Dr. Amarilys Marcano on March 12th.

## 2020-02-06 ENCOUNTER — TELEPHONE (OUTPATIENT)
Dept: FAMILY MEDICINE CLINIC | Age: 70
End: 2020-02-06

## 2020-02-06 NOTE — TELEPHONE ENCOUNTER
Jacqueline Contreras called just before 5:00 pm Wednesday evening to ask for a verbal OK be given to the Pharmacy so they could fill the script for the Klonopin that night instead of Thursday 2-6-2020. I told  her that Dr Salvatore Ruiz was not in and that I could walk back to see if there was someone that could do that for her. She said that I needed to talk to the Pharmacist and she handed her the phone. It was a very bad connection, I had a hard time hearing the Pharmacist and she me as well she said. The Pharmacist said she told Jacqueline Cotnreras that she couldn't take a verbal from anyone on her cell phone and that someone from the office would need to call back on the Pharmacy phone. She asked that I stay on the phone in case Jacqueline Contreras wanted to talk to me further. We then got disconnected. I called her back to ask if there was anything she wanted me to do, such as walk back to see if someone could help her and she said no, she was done talking. The reason she wanted the prescription filled that night was because she heard the weather might be bad on Thursday and didn't want to drive out in it.

## 2020-02-26 ENCOUNTER — TELEPHONE (OUTPATIENT)
Dept: FAMILY MEDICINE CLINIC | Age: 70
End: 2020-02-26

## 2020-02-26 RX ORDER — OMEPRAZOLE 40 MG/1
40 CAPSULE, DELAYED RELEASE ORAL
Qty: 90 CAPSULE | Refills: 0 | Status: SHIPPED | OUTPATIENT
Start: 2020-02-26 | End: 2020-03-13 | Stop reason: SDUPTHER

## 2020-02-26 NOTE — TELEPHONE ENCOUNTER
Patient is requesting a prescription be called into [facility], [address] for Omeprazole or Pantoprazole for her acid reflux. She has taken this medication in the past and she says it works for her. 90 day supply. Last seen 1-22-20.

## 2020-03-12 ENCOUNTER — OFFICE VISIT (OUTPATIENT)
Dept: FAMILY MEDICINE CLINIC | Age: 70
End: 2020-03-12
Payer: MEDICARE

## 2020-03-12 VITALS
WEIGHT: 197.2 LBS | HEIGHT: 64 IN | RESPIRATION RATE: 14 BRPM | DIASTOLIC BLOOD PRESSURE: 60 MMHG | BODY MASS INDEX: 33.67 KG/M2 | OXYGEN SATURATION: 97 % | HEART RATE: 72 BPM | SYSTOLIC BLOOD PRESSURE: 120 MMHG

## 2020-03-12 PROBLEM — J43.2 CENTRILOBULAR EMPHYSEMA (HCC): Status: ACTIVE | Noted: 2020-03-12

## 2020-03-12 PROCEDURE — G0439 PPPS, SUBSEQ VISIT: HCPCS | Performed by: FAMILY MEDICINE

## 2020-03-12 PROCEDURE — 99215 OFFICE O/P EST HI 40 MIN: CPT | Performed by: FAMILY MEDICINE

## 2020-03-12 RX ORDER — CLONAZEPAM 1 MG/1
1 TABLET ORAL DAILY
Qty: 30 TABLET | Refills: 0 | Status: SHIPPED | OUTPATIENT
Start: 2020-03-12 | End: 2020-04-02

## 2020-03-12 RX ORDER — METHOCARBAMOL 500 MG/1
500 TABLET, FILM COATED ORAL 2 TIMES DAILY PRN
Qty: 60 TABLET | Refills: 0 | Status: SHIPPED | OUTPATIENT
Start: 2020-03-12 | End: 2020-03-13 | Stop reason: SDUPTHER

## 2020-03-12 ASSESSMENT — ENCOUNTER SYMPTOMS
SHORTNESS OF BREATH: 0
NAUSEA: 0
BACK PAIN: 1
COUGH: 0
SORE THROAT: 0
VOMITING: 0
COLOR CHANGE: 0
ABDOMINAL PAIN: 0

## 2020-03-12 ASSESSMENT — PATIENT HEALTH QUESTIONNAIRE - PHQ9
SUM OF ALL RESPONSES TO PHQ QUESTIONS 1-9: 0
SUM OF ALL RESPONSES TO PHQ QUESTIONS 1-9: 0

## 2020-03-12 ASSESSMENT — LIFESTYLE VARIABLES: HOW OFTEN DO YOU HAVE A DRINK CONTAINING ALCOHOL: 0

## 2020-03-12 NOTE — PROGRESS NOTES
Medicare Annual Wellness Visit  Name: Shannon Age Date: 3/12/2020   MRN: [id number] Sex: Female   Age: 78 y.o. Ethnicity: Non-/Non    : 1950 Race: Leonora Lockwood is here for Medicare AWV and Establish Care    Screenings for behavioral, psychosocial and functional/safety risks, and cognitive dysfunction are all negative except as indicated below. These results, as well as other patient data from the [address] form, are documented in Flowsheets linked to this Encounter. Allergies   Allergen Reactions    Latex Rash    Penicillins Shortness Of Breath    Lipitor [Atorvastatin Calcium] Other (See Comments)     Muscle cramps    Nexium [Esomeprazole Magnesium] Other (See Comments)     Cramps, including feet, legs, and sides of abdomen    Bactrim [Sulfamethoxazole-Trimethoprim] Other (See Comments)     Dry mouth    Ciprofloxacin Rash     Rash, headache    Etodolac Rash    Flagyl [Metronidazole] Rash    Naprosyn [Naproxen] Nausea And Vomiting     GI upset    Prednisone Other (See Comments)     Headache, increased BP       Prior to Visit Medications    Medication Sig Taking? Authorizing Provider   omeprazole (PRILOSEC) 40 MG delayed release capsule Take 1 capsule by mouth every morning (before breakfast) for reflux/heartburn. Yes Howie Hernandez MD   guaiFENesin (MUCINEX) 600 MG extended release tablet 1-2 tabs every 12 hours as needed for congestion. Increase water intake with this medication. Yes OSKAR Wallace   oxybutynin (DITROPAN) 5 MG tablet Take 0.5-1 tablets by mouth 3 times daily as needed (for overactive bladder) Yes Riley Hernandez MD   ibuprofen (ADVIL;MOTRIN) 400 MG tablet TAKE 1 TO 2 TABLETS BY MOUTH EVERY 8 HOURS AS NEEDED FOR PAIN.  TAKE WITH FOOD AND USE SPARINGLY Yes Riley Hernandez MD   montelukast (SINGULAIR) 10 MG tablet Take 1 tablet by mouth nightly Yes Dave Garcia MD   levothyroxine (SYNTHROID) 100 MCG tablet TAKE 1 TABLET EVERY DAY Yes Riley Hernandez MD   tiZANidine (ZANAFLEX) 4 MG tablet Take 1 tablet by mouth nightly as needed (for muscle cramps and/or pain) Yes Riley Hernandez MD   Coenzyme Q10 200 MG CAPS Take 1 tablet by mouth daily Yes Riley Hernandez MD   methocarbamol (ROBAXIN) 500 MG tablet TAKE 1 TABLET FOUR TIMES DAILY AS NEEDED for PAIN/SPASMS. Yes Uzma Corbett MD   Estradiol (VAGIFEM) 10 MCG TABS vaginal tablet  Yes Historical Provider, MD   folic acid (FOLVITE) 916 MCG tablet Take 400 mcg by mouth daily Yes Historical Provider, MD   Zinc 50 MG TABS Take by mouth Yes Historical Provider, MD   Ascorbic Acid (VITAMIN C) 1000 MG tablet Take 1,000 mg by mouth daily Yes Historical Provider, MD   Calcium Carbonate Antacid 1000 MG CHEW Take by mouth Yes Historical Provider, MD   VENTOLIN  (90 Base) MCG/ACT inhaler INHALE 2 PUFFS EVERY 6 HOURS AS NEEDED FOR WHEEZING Yes RUSLAN Ramsey CNP   Glucos-MSM-C-Rb-Wgpoad-Uiliqb (BL MSM GLUCOSAMINE COMPLEX PO) Take by mouth Yes Historical Provider, MD   [employer] VITAMIN D3 1000 units CAPS TAKE 1 SOFTGEL BY MOUTH DAILY Yes RUSLAN Ramsey CNP   imiquimod (ALDARA) 5 % cream Apply topically three times a week Indications: [facility] ordering Apply topically three times a week. Yes RUSLAN Griffin CNP   Psyllium (METAMUCIL) 28.3 % POWD Take 1 each by mouth daily Yes Historical Provider, MD   lactase (LACTAID) 3000 UNITS tablet Take 1 tablet by mouth 3 times daily (with meals) Yes ANJUM Reed MD   cetirizine (ZYRTEC ALLERGY) 10 MG tablet Take 1 tablet by mouth daily Yes Dinorah Abdalla MD   hydrocortisone (ANUSOL-HC) 2.5 % rectal cream Place rectally 2 times daily. Yes RUSLAN Griffin CNP   acetaminophen (TYLENOL) 325 MG tablet Take 650 mg by mouth every 6 hours as needed for Pain Yes Historical Provider, MD   clonazePAM (KLONOPIN) 1 MG tablet TAKE 1 TABLET BY MOUTH TWICE DAILY AS NEEDED FOR ANXIETY.  MUST LAST 30 DAYS  Riley FELDER reviewed and are found in [facility] [address] [facility]. The following problems were reviewed today and where indicated follow up appointments were made and/or referrals ordered. Positive Risk Factor Screenings with Interventions:     General Health:  General  In general, how would you say your health is?: Good  In the past 7 days, have you experienced any of the following? New or Increased Pain, New or Increased Fatigue, Loneliness, Social Isolation, Stress or Anger?: None of These  Do you get the social and emotional support that you need?: Yes  Do you have a Living Will?: (!) No  General Health Risk Interventions:  · No Living Will: provided the state-specific advance directive document to the patient    Health Habits/Nutrition:  Health Habits/Nutrition  Do you exercise for at least 20 minutes 2-3 times per week?: (!) No  Have you lost any weight without trying in the past 3 months?: No  Do you eat fewer than 2 meals per day?: No  Have you seen a dentist within the past year?: Yes  Body mass index is 33.85 kg/m².   Health Habits/Nutrition Interventions:  · Inadequate physical activity:  educational materials provided to promote increased physical activity    Personalized Preventive Plan   Current Health Maintenance Status  Immunization History   Administered Date(s) Administered    Influenza 10/21/2010    Influenza Vaccine, unspecified formulation 09/14/2014    Influenza Virus Vaccine 09/30/2013, 09/01/2015    Influenza, High Dose (Fluzone 65 yrs and older) 10/03/2017    Influenza, Quadv, IM, PF (6 mo and older Fluzone, Flulaval, Fluarix, and 3 yrs and older Afluria) 09/13/2016    Pneumococcal Conjugate 13-valent ([id number]) 09/01/2015    Pneumococcal Polysaccharide ([id number]) 04/07/2015    Tdap (Boostrix, Adacel) 04/06/2009    Zoster Live (Zostavax) 08/13/2013        Health Maintenance   Topic Date Due    Low dose CT lung screening  01/12/2007    Shingles Vaccine (3 of 3) 10/12/2018    Annual Wellness Visit (AWV) 05/29/2019    Pneumococcal 65+ years Vaccine (2 of 2 - PPSV23) 04/07/2020    TSH testing  07/16/2020    Breast cancer screen  10/04/2021    Diabetes screen  07/16/2022    Lipid screen  07/16/2024    Colon cancer screen colonoscopy  04/18/2026    DTaP/Tdap/Td vaccine (3 - Td) 06/04/2029    DEXA (modify frequency per FRAX score)  Completed    Flu vaccine  Completed    Hepatitis C screen  Completed    Hepatitis A vaccine  Aged Out    Hepatitis B vaccine  Aged Out    Hib vaccine  Aged Out    Meningococcal (ACWY) vaccine  Aged Out     Recommendations for [employer] Due: see orders and patient instructions/AVS.  . Recommended screening schedule for the next 5-10 years is provided to the patient in written form: see Patient Instructions/AVS.    Juan MOE LPN, 7/42/8899, performed the documented evaluation under the direct supervision of the attending physician. This encounter was performed under myEndy MDs, direct supervision, 3/12/2020.

## 2020-03-12 NOTE — PROGRESS NOTES
3/12/2020    This is a 78 y.o. female who presents for  Chief Complaint   Patient presents with    Medicare AWV    Establish Care       HPI:     Kendrick Camacho is here to establish care today  Her demeanor is very abrasive from the start of the visit     Hx of sciatica  she \"lives in constant pain\"  Dr. Dandre Dailey gave her an injection and \"it ruined her, that's how she got where she is\"   Dr. Sarbjit Reynolds at [place] told her it wasn't sciatica, \"told her the truth\"  Told her it was her knee that was the issue  She is wanting to \"take marijuana recreationally when it becomes legal but she doesn't want to drive to the dispensary an hour away and pay then $250 dollars\"  Took part in physical therapy for 2.5 years, still does the exercises TID  She requests methocarbamol four times daily, \"robaxin and tizanidine do not touch her pain\"   \"nothing helps her pain\"     She has a hx of anxiety  \"she is down to twice a day klonopin because she knew it was going to be difficult to get here\"  She was told [facility] people that she would have no problem getting this medication here. \"   \"She knows a friend who gets 2.5 mg multiple times a day from Dr. Xochitl Narayan and her friend who is a nurse said that benzos don't even do anything to you. \"   She refused psychiatry referral:  Alexis Dior you going to drive me there and pay for it? \"  She demanded I send it to the pharmacy and tell them to deliver it to her. Albuterol PRN, used before she came here   Before going out into the cold and heat  Hx of COPD and Asthma, Hx of PFTs in the past  When I asked her if she was diagnoses with these and how long: \"shit, I don't know, it's in there, you tell me. \"   Nonsmoker: \"quit a long time ago\"   8-10 years ago     Sees [facility]  Has a return visit in 6 months     Taking ditropan  No new urinary complaints   Yellow/orange urine at time  No burning, pain, hematuria    She demands I call the pharmacy to see if she needs any refills on her medications    Refuses CT lung other intervention. Will send in weaning dose for 1 mo and then no more refills. - clonazePAM (KLONOPIN) 1 MG tablet; Take 1 tablet by mouth daily for 30 days. MUST LAST 30 DAYS  Dispense: 30 tablet; Refill: 0    4. Centrilobular emphysema ([address]  5. Mild intermittent asthma without complication  Refused Pulmonology referral   C/w Zyrtec, Ventolin PRN  Not ready to quit smoking despite my recommendation     6. Tobacco use disorder  Refuses to quit    7. Mixed hyperlipidemia  Last  on 7/16/19, not on statin therapy  The 10-year ASCVD risk score (Evelio Bustamante, et al., 2013) is: 6.7%    Values used to calculate the score:      Age: 78 years      Sex: Female      Is Non- : No      Diabetic: No      Tobacco smoker: No      Systolic Blood Pressure: 465 mmHg      Is BP treated: No      HDL Cholesterol: 63 mg/dL      Total Cholesterol: 210 mg/dL    8. Acquired hypothyroidism  C/w synthroid 100 mcg  Last TSH normal on 7/16/19    9. H/O vulvar dysplasia    10. DDD (degenerative disc disease), lumbar  She discusses her desire to start medicinal marijuana but states she doesn't want to drive to the facility or pay for the card/visit/marijuana. I discuss that opiate therapy will not be prescribed by this office. Takes NSAIDs PRN. Offered tylenol arthritis, which she laughed at. She specifically requested Robaxin four times daily, will start at BID given she has not been on this medication prior to today. Refuses PT, home PT, daily exercises, attempts for WL, topical medications at this time. - methocarbamol (ROBAXIN) 500 MG tablet; Take 1 tablet by mouth 2 times daily as needed (muscle spasms)  Dispense: 60 tablet; Refill: 0    11. Lumbosacral spondylosis without myelopathy  Per above. - methocarbamol (ROBAXIN) 500 MG tablet; Take 1 tablet by mouth 2 times daily as needed (muscle spasms)  Dispense: 60 tablet; Refill: 0    12. Chronic midline thoracic back pain  Per above.    - methocarbamol

## 2020-03-12 NOTE — PATIENT INSTRUCTIONS
cholesterol, this type of cholesterol actually carries cholesterol away from your arteries and may, therefore, help lower your risk of having a heart attack. You want this level to be high (ideally greater than 60). It is a risk to have a level less than 40. You can raise this good cholesterol by eating olive oil, canola oil, avocados, or nuts. Exercise raises this level, too. Fat    Fat is calorie dense and packs a lot of calories into a small amount of food. Even though fats should be limited due to their high calorie content, not all fats are bad. In fact, some fats are quite healthful. Fat can be broken down into four main types. The good-for-you fats are:   Monounsaturated fat  found in oils such as olive and canola, avocados, and nuts and natural nut butters; can decrease cholesterol levels, while keeping levels of HDL cholesterol high   Polyunsaturated fat  found in oils such as safflower, sunflower, soybean, corn, and sesame; can decrease total cholesterol and LDL cholesterol   Omega-3 fatty acids  particularly those found in fatty fish (such as salmon, trout, tuna, mackerel, herring, and sardines); can decrease risk of arrhythmias, decrease triglyceride levels, and slightly lower blood pressure   The fats that you want to limit are:   Saturated fat  found in animal products, many fast foods, and a few vegetables; increases total blood cholesterol, including LDL levels   Animal fats that are saturated include: butter, lard, whole-milk dairy products, meat fat, and poultry skin   Vegetable fats that are saturated include: hydrogenated shortening, palm oil, coconut oil, cocoa butter   Hydrogenated or trans fat  found in margarine and vegetable shortening, most shelf stable snack foods, and fried foods; increases LDL and decreases HDL     It is generally recommended that you limit your total fat for the day to less than 30% of your total calories.  If you follow an 1800-calorie heart healthy diet, for week) Tofu Nuts or seeds (unsalted, dry-roasted), low-sodium peanut butter Dried peas, beans, and lentils   Any smoked, cured, salted, or canned meat, fish, or poultry (including marquez, chipped beef, cold cuts, hot dogs, sausages, sardines, and anchovies) Poultry skins Breaded and/or fried fish or meats Canned peas, beans, and lentils Salted nuts   Fats and Oils   Olive oil and canola oil Low-sodium, low-fat salad dressings and mayonnaise   Butter, margarine, coconut and palm oils, marquez fat   Snacks, Sweets, and Condiments   Low-sodium or unsalted versions of broths, soups, soy sauce, and condiments Pepper, herbs, and spices; vinegar, lemon, or lime juice Low-fat frozen desserts (yogurt, sherbet, fruit bars) Sugar, cocoa powder, honey, syrup, jam, and preserves Low-fat, trans-fat free cookies, cakes, and pies Frank and animal crackers, fig bars, fátima snaps   High-fat desserts Broth, soups, gravies, and sauces, made from instant mixes or other high-sodium ingredients Salted snack foods Canned olives Meat tenderizers, seasoning salt, and most flavored vinegars   Beverages   Low-sodium carbonated beverages Tea and coffee in moderation Soy milk   Commercially softened water   Suggestions   Make whole grains, fruits, and vegetables the base of your diet. Choose heart-healthy fats such as canola, olive, and flaxseed oil, and foods high in heart-healthy fats, such as nuts, seeds, soybeans, tofu, and fish. Eat fish at least twice per week; the fish highest in omega-3 fatty acids and lowest in mercury include salmon, herring, mackerel, sardines, and canned chunk light tuna. If you eat fish less than twice per week or have high triglycerides, talk to your doctor about taking fish oil supplements. Read food labels.    For products low in fat and cholesterol, look for fat free, low-fat, cholesterol free, saturated fat free, and trans fat freeAlso scan the Nutrition Facts Label, which lists saturated fat, trans fat, and cholesterol amounts. For products low in sodium, look for sodium free, very low sodium, low sodium, no added salt, and unsalted   Skip the salt when cooking or at the table; if food needs more flavor, get creative and try out different herbs and spices. Garlic and onion also add substantial flavor to foods. Trim any visible fat off meat and poultry before cooking, and drain the fat off after valenzuela. Use cooking methods that require little or no added fat, such as grilling, boiling, baking, poaching, broiling, roasting, steaming, stir-frying, and sauting. Avoid fast food and convenience food. They tend to be high in saturated and trans fat and have a lot of added salt. Talk to a registered dietitian for individualized diet advice. Last Reviewed: March 2011 Miguel Angel Brown MS, MPH, RD   Updated: 3/29/2011   ·     Keep Your Memory Abbey Mechelle       Many factors can affect your ability to remembera hectic lifestyle, aging, stress, chronic disease, and certain medicines. But, there are steps you can take to sharpen your mind and help preserve your memory. Challenge Your Brain   Regularly challenging your mind may help keeps it in top shape. Good mental exercises include:   Crossword puzzlesUse a dictionary if you need it; you will learn more that way. Brainteasers Try some! Crafts, such as wood working and sewing   Hobbies, such as gardening and building model airplanes   SocializingVisit old friends or join groups to meet new ones. Reading   Learning a new language   Taking a class, whether it be art history or melania chi   TravelingExperience the food, history, and culture of your destination   Learning to use a computer   Going to museums, the theater, or thought-provoking movies   Changing things in your daily life, such as reversing your pattern in the grocery store or brushing your teeth using your nondominant hand   Use Memory Aids   There is no need to remember every detail on your own.  These memory aids can help:   Calendars and day planners   Electronic organizers to store all sorts of helpful informationThese devices can \"beep\" to remind you of appointments. A book of days to record birthdays, anniversaries, and other occasions that occur on the same date every year   Detailed \"to-do\" lists and strategically placed sticky notes   Quick \"study\" sessionsBefore a gathering, review who will be there so their names will be fresh in your mind. Establish routinesFor example, keep your keys, wallet, and umbrella in the same place all the time or take medicine with your 8:00 AM glass of juice   Live a Healthy Life   Many actions that will keep your body strong will do the same for your mind. For example:   Talk to Your Doctor About Herbs and Supplements    Malnutrition and vitamin deficiencies can impair your mental function. For example, vitamin B12 deficiency can cause a range of symptoms, including confusion. But, what if your nutritional needs are being met? Can herbs and supplements still offer a benefit? Researchers have investigated a range of natural remedies, such as ginkgo , ginseng , and the supplement phosphatidylserine (PS). So far, though, the evidence is inconsistent as to whether these products can improve memory or thinking. If you are interested in taking herbs and supplements, talk to your doctor first because they may interact with other medicines that you are taking. Exercise Regularly    Among the many benefits of regular exercise are increased blood flow to the brain and decreased risk of certain diseases that can interfere with memory function. One study found that even moderate exercise has a beneficial effect. Examples of \"moderate\" exercise include:   Playing 18 holes of golf once a week, without a cart   Playing tennis twice a week   Walking one mile per day   Manage Stress    It can be tough to remember what is important when your mind is cluttered.  Make time for complicated, or your family can't agree on what should be in your living will. You can change your living will at any time. Some people find that their wishes about end-of-life care change as their health changes. In addition to making a living will, think about completing a medical power of  form. This form lets you name the person you want to make end-of-life treatment decisions for you (your \"health care agent\") if you're not able to. Many hospitals and nursing homes will give you the forms you need to complete a living will and a medical power of . Your living will is used only if you can't make or communicate decisions for yourself anymore. If you become able to make decisions again, you can accept or refuse any treatment, no matter what you wrote in your living will. Your state may offer an online registry. This is a place where you can store your living will online so the doctors and nurses who need to treat you can find it right away. What should you think about when creating a living will? Talk about your end-of-life wishes with your family members and your doctor. Let them know what you want. That way the people making decisions for you won't be surprised by your choices. Think about these questions as you make your living will:  Do you know enough about life support methods that might be used? If not, talk to your doctor so you know what might be done if you can't breathe on your own, your heart stops, or you're unable to swallow. What things would you still want to be able to do after you receive life-support methods? Would you want to be able to walk? To speak? To eat on your own? To live without the help of machines? If you have a choice, where do you want to be cared for? In your home? At a hospital or nursing home? Do you want certain Mandaen practices performed if you become very ill? If you have a choice at the end of your life, where would you prefer to die? At home?

## 2020-03-13 RX ORDER — OMEPRAZOLE 40 MG/1
40 CAPSULE, DELAYED RELEASE ORAL
Qty: 90 CAPSULE | Refills: 0 | Status: SHIPPED | OUTPATIENT
Start: 2020-03-13 | End: 2020-05-01 | Stop reason: SDUPTHER

## 2020-03-13 RX ORDER — METHOCARBAMOL 500 MG/1
500 TABLET, FILM COATED ORAL 2 TIMES DAILY PRN
Qty: 180 TABLET | Refills: 0 | Status: SHIPPED | OUTPATIENT
Start: 2020-03-13 | End: 2020-04-12

## 2020-03-13 NOTE — TELEPHONE ENCOUNTER
Patient states she needs for the methocarbamol (ROBAXIN) 500 MG to be increased to a 90 day supplies to avoid co-pay, please advise.

## 2020-03-31 ENCOUNTER — TELEPHONE (OUTPATIENT)
Dept: FAMILY MEDICINE CLINIC | Age: 70
End: 2020-03-31

## 2020-04-01 ENCOUNTER — TELEPHONE (OUTPATIENT)
Dept: FAMILY MEDICINE CLINIC | Age: 70
End: 2020-04-01

## 2020-04-01 NOTE — TELEPHONE ENCOUNTER
On Call Communication: The patient called to discuss her Klonopin. She is worried about coming off of Klonopin and having withdrawal symptoms. She states that her anxiety is elevated currently. Pt is also upset about her robaxin being cut down to twice per day. States that she is having severe foot and leg cramps. Pt requesting a call back from her PCP. Discussed with the patient that this was not an appropriate after hours call.

## 2020-04-02 RX ORDER — CLONAZEPAM 0.5 MG/1
TABLET ORAL
Qty: 21 TABLET | Refills: 0 | Status: SHIPPED | OUTPATIENT
Start: 2020-04-02 | End: 2020-05-28

## 2020-04-02 NOTE — TELEPHONE ENCOUNTER
(Prior to call, please read my HPI from her last visit, not the AWV)    We heavily discussed the need to wean her Klonopin at her appointment with me. Again, we do not usually prescribe long term benzodiazepines from this office. At her visit, we discussed side effects in detail (which she did not believe) and I offered her a referral to psychiatry (which she refused). I also offered her different medications to help with symptoms of anxiety and she refused those. I will send in additional weaning doses to her pharmacy on file. Please let her know.  Thanks

## 2020-04-16 ENCOUNTER — TELEPHONE (OUTPATIENT)
Dept: FAMILY MEDICINE CLINIC | Age: 70
End: 2020-04-16

## 2020-04-16 ENCOUNTER — VIRTUAL VISIT (OUTPATIENT)
Dept: FAMILY MEDICINE CLINIC | Age: 70
End: 2020-04-16
Payer: MEDICARE

## 2020-04-16 PROBLEM — R82.998 DARK BROWN URINE: Status: ACTIVE | Noted: 2020-04-16

## 2020-04-16 PROBLEM — R82.998 DARK BROWN URINE: Status: RESOLVED | Noted: 2020-04-16 | Resolved: 2020-04-16

## 2020-04-16 PROCEDURE — 99442 PR PHYS/QHP TELEPHONE EVALUATION 11-20 MIN: CPT | Performed by: FAMILY MEDICINE

## 2020-04-16 RX ORDER — NITROFURANTOIN 25; 75 MG/1; MG/1
100 CAPSULE ORAL 2 TIMES DAILY
Qty: 20 CAPSULE | Refills: 0 | Status: SHIPPED | OUTPATIENT
Start: 2020-04-16 | End: 2020-04-26

## 2020-04-16 RX ORDER — NITROFURANTOIN 25; 75 MG/1; MG/1
100 CAPSULE ORAL 2 TIMES DAILY
Qty: 20 CAPSULE | Refills: 0 | Status: SHIPPED | OUTPATIENT
Start: 2020-04-16 | End: 2020-04-16 | Stop reason: SDUPTHER

## 2020-04-16 NOTE — PROGRESS NOTES
Radha Handley is a 78 y.o. female evaluated via telephone on 4/16/2020. Consent:  She and/or health care decision maker is aware that that she may receive a bill for this telephone service, depending on her insurance coverage, and has provided verbal consent to proceed: Yes      Documentation:  I communicated with the patient and/or health care decision maker about:     Urinary concerns:   Started last week  Urinating more at night  Has chronic back issues, but having more lower back pain  Likely due to lack of exercise, with the exception of 3 yoga exercises   No dysuria   Inc urge, freq  No hematuria   Darker in color, drinking water daily   +IBS, so ?abd pain, +gas and bloating   No flank pain, a little, chronic   No fevers  No n/v  72 oz daily     No vaginal complaints, no bleeding, some itching in her clitoris portion due to her medication  No BRBR     Details of this discussion including any medical advice provided:     1. Acute cystitis without hematuria  - nitrofurantoin, macrocrystal-monohydrate, (MACROBID) 100 MG capsule; Take 1 capsule by mouth 2 times daily for 10 days  Dispense: 20 capsule; Refill: 0    I affirm this is a Patient Initiated Episode with an Established Patient who has not had a related appointment within my department in the past 7 days or scheduled within the next 24 hours.     Total Time: minutes: 11-20 minutes    Note: not billable if this call serves to triage the patient into an appointment for the relevant concern      Israel Matute MD

## 2020-04-17 ENCOUNTER — TELEPHONE (OUTPATIENT)
Dept: FAMILY MEDICINE CLINIC | Age: 70
End: 2020-04-17

## 2020-04-17 NOTE — TELEPHONE ENCOUNTER
Patient states that she is waking up in the middle of the night and cannot go back to sleep. This has been going on for about a week ago. She states that she does wake up because she has to Urinate. She is unsure if that is complete it. She is wondering if it is from he muscle relaxer being cut from 3 a day to to 2 day or could it be from weaning off the Clonopin? Can it be from the anxiety?

## 2020-04-17 NOTE — TELEPHONE ENCOUNTER
PATIENT IS CALLING TO STATES SHE HAS SLEEPING PROBLEMS AND WANTS TO KNOW IF ITS DUE TO HER MEDICATION ADJUSTMENT , OR INFECTION  PLEASE CALL TO ADVISE . BEST CONTACT [phone]. OR  [phone]

## 2020-04-17 NOTE — TELEPHONE ENCOUNTER
Called pt back and she states that all she drinks is 72oz of water daily and that she does not drink any caffeine. And that the only entertainment that she has is a TV. She lives alone so she has no other entertainment. Informed pt. Of your suggestions.

## 2020-04-30 ENCOUNTER — TELEPHONE (OUTPATIENT)
Dept: FAMILY MEDICINE CLINIC | Age: 70
End: 2020-04-30

## 2020-05-01 ENCOUNTER — VIRTUAL VISIT (OUTPATIENT)
Dept: FAMILY MEDICINE CLINIC | Age: 70
End: 2020-05-01
Payer: MEDICARE

## 2020-05-01 PROCEDURE — 99213 OFFICE O/P EST LOW 20 MIN: CPT | Performed by: NURSE PRACTITIONER

## 2020-05-01 RX ORDER — MONTELUKAST SODIUM 10 MG/1
TABLET ORAL
COMMUNITY
Start: 2020-03-26 | End: 2020-07-15

## 2020-05-01 RX ORDER — OMEPRAZOLE 40 MG/1
40 CAPSULE, DELAYED RELEASE ORAL
Qty: 90 CAPSULE | Refills: 1 | Status: SHIPPED | OUTPATIENT
Start: 2020-05-01 | End: 2020-05-05 | Stop reason: SDUPTHER

## 2020-05-01 RX ORDER — CHLORAL HYDRATE 500 MG
3000 CAPSULE ORAL 2 TIMES DAILY
COMMUNITY
End: 2021-12-09

## 2020-05-01 RX ORDER — TRIAMCINOLONE ACETONIDE 55 UG/1
2 SPRAY, METERED NASAL DAILY
Qty: 1 INHALER | Refills: 0 | Status: SHIPPED | OUTPATIENT
Start: 2020-05-01 | End: 2020-05-05 | Stop reason: SDUPTHER

## 2020-05-01 ASSESSMENT — ENCOUNTER SYMPTOMS
SORE THROAT: 0
GASTROINTESTINAL NEGATIVE: 1
VOICE CHANGE: 0
SINUS PRESSURE: 1
RESPIRATORY NEGATIVE: 1
TROUBLE SWALLOWING: 0
SINUS PAIN: 0
EYES NEGATIVE: 1
FACIAL SWELLING: 0
RHINORRHEA: 0

## 2020-05-01 NOTE — PROGRESS NOTES
Needs refill on Omeprazole, can have 90 day supply if insurance will cover and can be sent to [facility]/pharmacy [place] [phone] [facility]

## 2020-05-01 NOTE — PROGRESS NOTES
2020    TELEHEALTH EVALUATION -- Audio/Visual (During TVXFE-10 public health emergency)    HPI:    Josh Johnson (:  1950) has requested an audio/video evaluation for the following concern(s):    Pt was unable to do a VV, so we are doing a telephone visit. Pt c/o nasal congestion for the past 3 days. Has been using Simply Saline Nasal Spray without any relief. C/o maxillary and frontal sinus tenderness and PND. She takes Singulair daily. Denies fever or chills. Review of Systems   Constitutional: Negative. HENT: Positive for congestion, postnasal drip and sinus pressure. Negative for dental problem, ear discharge, ear pain, facial swelling, rhinorrhea, sinus pain, sneezing, sore throat, tinnitus, trouble swallowing and voice change. Eyes: Negative. Respiratory: Negative. Cardiovascular: Negative. Gastrointestinal: Negative. Skin: Negative. Neurological: Negative. Hematological: Negative. Prior to Visit Medications    Medication Sig Taking? Authorizing Provider   Omega-3 Fatty Acids (FISH OIL) 1000 MG CAPS Take 3,000 mg by mouth 2 times daily Yes Historical Provider, MD   doxyLAMINE succinate (UNISOM) 25 MG tablet Take 1 tablet by mouth nightly as needed for Sleep Yes Fabio Garnica MD   omeprazole (PRILOSEC) 40 MG delayed release capsule Take 1 capsule by mouth every morning (before breakfast) for reflux/heartburn. Yes Fabio Garnica MD   oxybutynin (DITROPAN) 5 MG tablet Take 0.5-1 tablets by mouth 3 times daily as needed (for overactive bladder) Yes Riley Hernandez MD   ibuprofen (ADVIL;MOTRIN) 400 MG tablet TAKE 1 TO 2 TABLETS BY MOUTH EVERY 8 HOURS AS NEEDED FOR PAIN.  TAKE WITH FOOD AND USE SPARINGLY Yes Lia Hernandez MD   levothyroxine (SYNTHROID) 100 MCG tablet TAKE 1 TABLET EVERY DAY Yes Riley Hernandez MD   Coenzyme Q10 200 MG CAPS Take 1 tablet by mouth daily Yes Nino Bloom MD   Estradiol (VAGIFEM) 10 MCG TABS vaginal tablet  Yes

## 2020-05-01 NOTE — PATIENT INSTRUCTIONS
acetaminophen (Tylenol) can be harmful. · Get plenty of rest.  · Do not smoke or allow others to smoke around you. If you need help quitting, talk to your doctor about stop-smoking programs and medicines. These can increase your chances of quitting for good. When should you call for help? Call 911 anytime you think you may need emergency care. For example, call if:    · You have severe trouble breathing.    Call your doctor now or seek immediate medical care if:    · You seem to be getting much sicker.     · You have new or worse trouble breathing.     · You have a new or higher fever.     · You have a new rash.    Watch closely for changes in your health, and be sure to contact your doctor if:    · You have a new symptom, such as a sore throat, an earache, or sinus pain.     · You cough more deeply or more often, especially if you notice more mucus or a change in the color of your mucus.     · You do not get better as expected. Where can you learn more? Go to [email or web address]. org and sign in to your [employer] account. Enter L955 in the NextInput box to learn more about \"Upper Respiratory Infection (Cold): Care Instructions. \"     If you do not have an account, please click on the \"Sign Up Now\" link. Current as of: June 9, 2019Content Version: 12.4  © 7482-5356 Healthwise, Incorporated. Care instructions adapted under license by [facility] ([facility]). If you have questions about a medical condition or this instruction, always ask your healthcare professional. [place] [address] any warranty or liability for your use of this information.

## 2020-05-05 RX ORDER — OMEPRAZOLE 40 MG/1
40 CAPSULE, DELAYED RELEASE ORAL
Qty: 90 CAPSULE | Refills: 1 | OUTPATIENT
Start: 2020-05-05

## 2020-05-05 RX ORDER — OMEPRAZOLE 40 MG/1
40 CAPSULE, DELAYED RELEASE ORAL
Qty: 90 CAPSULE | Refills: 1 | Status: SHIPPED | OUTPATIENT
Start: 2020-05-05 | End: 2020-07-15 | Stop reason: SDUPTHER

## 2020-05-05 RX ORDER — TRIAMCINOLONE ACETONIDE 55 UG/1
2 SPRAY, METERED NASAL DAILY
Qty: 1 INHALER | Refills: 0 | Status: SHIPPED | OUTPATIENT
Start: 2020-05-05 | End: 2020-07-15

## 2020-05-05 RX ORDER — TRIAMCINOLONE ACETONIDE 55 UG/1
2 SPRAY, METERED NASAL DAILY
Qty: 1 INHALER | Refills: 0 | OUTPATIENT
Start: 2020-05-05

## 2020-05-05 NOTE — TELEPHONE ENCOUNTER
Pt stated that she needs a refill for:    omeprazole (PRILOSEC) 40 MG delayed release capsule    triamcinolone (NASACORT ALLERGY 24HR) 55 MCG/ACT nasal inhaler. An alternative to this that her insurance will cover.     Pt was supposed to have the meds 4 days ago she stated      Dre     Address: [address] [place] [address] [place], [address] [facility] [address]  Phone: [phone]

## 2020-05-22 ENCOUNTER — TELEPHONE (OUTPATIENT)
Dept: FAMILY MEDICINE CLINIC | Age: 70
End: 2020-05-22

## 2020-05-22 ENCOUNTER — VIRTUAL VISIT (OUTPATIENT)
Dept: FAMILY MEDICINE CLINIC | Age: 70
End: 2020-05-22
Payer: MEDICARE

## 2020-05-22 PROCEDURE — 99442 PR PHYS/QHP TELEPHONE EVALUATION 11-20 MIN: CPT | Performed by: PHYSICIAN ASSISTANT

## 2020-05-22 RX ORDER — SULFAMETHOXAZOLE AND TRIMETHOPRIM 800; 160 MG/1; MG/1
1 TABLET ORAL 2 TIMES DAILY
Qty: 10 TABLET | Refills: 0 | Status: SHIPPED | OUTPATIENT
Start: 2020-05-22 | End: 2020-05-27

## 2020-05-22 RX ORDER — NITROFURANTOIN 25; 75 MG/1; MG/1
100 CAPSULE ORAL 2 TIMES DAILY
Qty: 20 CAPSULE | Refills: 0 | Status: SHIPPED | OUTPATIENT
Start: 2020-05-22 | End: 2020-05-26

## 2020-05-22 NOTE — TELEPHONE ENCOUNTER
There isn't another ABX that treats UTIs that isnt on her allergy list. I can send in bactrim if she would like. It is listed to cause dry mouth only.  Please let me know if she is aggreable

## 2020-05-26 ENCOUNTER — NURSE ONLY (OUTPATIENT)
Dept: FAMILY MEDICINE CLINIC | Age: 70
End: 2020-05-26
Payer: MEDICARE

## 2020-05-26 ENCOUNTER — TELEPHONE (OUTPATIENT)
Dept: FAMILY MEDICINE CLINIC | Age: 70
End: 2020-05-26

## 2020-05-26 LAB
BILIRUBIN, POC: ABNORMAL
BLOOD URINE, POC: ABNORMAL
CLARITY, POC: ABNORMAL
COLOR, POC: YELLOW
GLUCOSE URINE, POC: ABNORMAL
KETONES, POC: ABNORMAL
LEUKOCYTE EST, POC: ABNORMAL
NITRITE, POC: ABNORMAL
PH, POC: 5.5
PROTEIN, POC: ABNORMAL
SPECIFIC GRAVITY, POC: 1.02
UROBILINOGEN, POC: 0.2

## 2020-05-26 PROCEDURE — 81002 URINALYSIS NONAUTO W/O SCOPE: CPT | Performed by: PHYSICIAN ASSISTANT

## 2020-05-26 NOTE — TELEPHONE ENCOUNTER
"Pt says the tylenol I gave him is \"not working\", complaining of leg pain 10/10 sharp tingles and burning consistent with his current dx, paged michaelar purple for further orders. Awaiting call back.  " Pt. Is asking if you are going to refill her AB for UTI symptoms until her UC comes back.

## 2020-05-27 NOTE — TELEPHONE ENCOUNTER
Pt called after hours to report suspected dehydration. Encouraged increased fluids including chicken broth, gatorade, etc. To the ER for severe symptoms. UTI symptoms are improved. Pt left a urine today. Culture running.

## 2020-05-28 ENCOUNTER — APPOINTMENT (OUTPATIENT)
Dept: GENERAL RADIOLOGY | Age: 70
End: 2020-05-28
Payer: MEDICARE

## 2020-05-28 ENCOUNTER — HOSPITAL ENCOUNTER (OUTPATIENT)
Age: 70
Setting detail: OBSERVATION
Discharge: HOME | End: 2020-05-29
Attending: EMERGENCY MEDICINE | Admitting: INTERNAL MEDICINE
Payer: MEDICARE

## 2020-05-28 ENCOUNTER — APPOINTMENT (OUTPATIENT)
Dept: CT IMAGING | Age: 70
End: 2020-05-28
Payer: MEDICARE

## 2020-05-28 ENCOUNTER — TELEPHONE (OUTPATIENT)
Dept: FAMILY MEDICINE CLINIC | Age: 70
End: 2020-05-28

## 2020-05-28 ENCOUNTER — NURSE TRIAGE (OUTPATIENT)
Dept: OTHER | Facility: CLINIC | Age: 70
End: 2020-05-28

## 2020-05-28 PROBLEM — R68.84 JAW PAIN: Status: ACTIVE | Noted: 2020-05-28

## 2020-05-28 LAB
A/G RATIO: 1.7 (ref 1.1–2.2)
ALBUMIN SERPL-MCNC: 4.6 G/DL (ref 3.4–5)
ALP BLD-CCNC: 56 U/L (ref 40–129)
ALT SERPL-CCNC: 19 U/L (ref 10–40)
ANION GAP SERPL CALCULATED.3IONS-SCNC: 12 MMOL/L (ref 3–16)
AST SERPL-CCNC: 34 U/L (ref 15–37)
BACTERIA: ABNORMAL /HPF
BASOPHILS ABSOLUTE: 0 K/UL (ref 0–0.2)
BASOPHILS RELATIVE PERCENT: 0.8 %
BILIRUB SERPL-MCNC: 0.3 MG/DL (ref 0–1)
BILIRUBIN URINE: NEGATIVE
BLOOD, URINE: NEGATIVE
BUN BLDV-MCNC: 17 MG/DL (ref 7–20)
CALCIUM SERPL-MCNC: 9.7 MG/DL (ref 8.3–10.6)
CHLORIDE BLD-SCNC: 97 MMOL/L (ref 99–110)
CLARITY: CLEAR
CO2: 25 MMOL/L (ref 21–32)
COLOR: YELLOW
CREAT SERPL-MCNC: 0.9 MG/DL (ref 0.6–1.2)
EOSINOPHILS ABSOLUTE: 0.1 K/UL (ref 0–0.6)
EOSINOPHILS RELATIVE PERCENT: 1.4 %
EPITHELIAL CELLS, UA: ABNORMAL /HPF (ref 0–5)
GFR AFRICAN AMERICAN: >60
GFR NON-AFRICAN AMERICAN: >60
GLOBULIN: 2.7 G/DL
GLUCOSE BLD-MCNC: 105 MG/DL (ref 70–99)
GLUCOSE BLD-MCNC: 106 MG/DL (ref 70–99)
GLUCOSE URINE: NEGATIVE MG/DL
HCT VFR BLD CALC: 37 % (ref 36–48)
HEMOGLOBIN: 12.5 G/DL (ref 12–16)
INR BLD: 0.98 (ref 0.86–1.14)
KETONES, URINE: NEGATIVE MG/DL
LEUKOCYTE ESTERASE, URINE: ABNORMAL
LYMPHOCYTES ABSOLUTE: 2.3 K/UL (ref 1–5.1)
LYMPHOCYTES RELATIVE PERCENT: 36.8 %
MCH RBC QN AUTO: 29 PG (ref 26–34)
MCHC RBC AUTO-ENTMCNC: 33.7 G/DL (ref 31–36)
MCV RBC AUTO: 86 FL (ref 80–100)
MICROSCOPIC EXAMINATION: YES
MONOCYTES ABSOLUTE: 0.6 K/UL (ref 0–1.3)
MONOCYTES RELATIVE PERCENT: 9.3 %
NEUTROPHILS ABSOLUTE: 3.2 K/UL (ref 1.7–7.7)
NEUTROPHILS RELATIVE PERCENT: 51.7 %
NITRITE, URINE: NEGATIVE
ORGANISM: ABNORMAL
PDW BLD-RTO: 13.6 % (ref 12.4–15.4)
PERFORMED ON: ABNORMAL
PH UA: 6 (ref 5–8)
PLATELET # BLD: 267 K/UL (ref 135–450)
PMV BLD AUTO: 7.3 FL (ref 5–10.5)
POTASSIUM SERPL-SCNC: 4.1 MMOL/L (ref 3.5–5.1)
PROTEIN UA: NEGATIVE MG/DL
PROTHROMBIN TIME: 11.4 SEC (ref 10–13.2)
RBC # BLD: 4.3 M/UL (ref 4–5.2)
RBC UA: ABNORMAL /HPF (ref 0–4)
SODIUM BLD-SCNC: 134 MMOL/L (ref 136–145)
SPECIFIC GRAVITY UA: <=1.005 (ref 1–1.03)
SPECIMEN STATUS: NORMAL
TOTAL PROTEIN: 7.3 G/DL (ref 6.4–8.2)
TROPONIN: <0.01 NG/ML
URINE CULTURE, ROUTINE: ABNORMAL
URINE TYPE: ABNORMAL
UROBILINOGEN, URINE: 0.2 E.U./DL
WBC # BLD: 6.1 K/UL (ref 4–11)
WBC UA: ABNORMAL /HPF (ref 0–5)

## 2020-05-28 PROCEDURE — G0378 HOSPITAL OBSERVATION PER HR: HCPCS

## 2020-05-28 PROCEDURE — 84484 ASSAY OF TROPONIN QUANT: CPT

## 2020-05-28 PROCEDURE — 85025 COMPLETE CBC W/AUTO DIFF WBC: CPT

## 2020-05-28 PROCEDURE — 93005 ELECTROCARDIOGRAM TRACING: CPT | Performed by: NURSE PRACTITIONER

## 2020-05-28 PROCEDURE — 6370000000 HC RX 637 (ALT 250 FOR IP): Performed by: NURSE PRACTITIONER

## 2020-05-28 PROCEDURE — 70450 CT HEAD/BRAIN W/O DYE: CPT

## 2020-05-28 PROCEDURE — 81001 URINALYSIS AUTO W/SCOPE: CPT

## 2020-05-28 PROCEDURE — 99285 EMERGENCY DEPT VISIT HI MDM: CPT

## 2020-05-28 PROCEDURE — 85610 PROTHROMBIN TIME: CPT

## 2020-05-28 PROCEDURE — 2580000003 HC RX 258: Performed by: NURSE PRACTITIONER

## 2020-05-28 PROCEDURE — 36415 COLL VENOUS BLD VENIPUNCTURE: CPT

## 2020-05-28 PROCEDURE — 80053 COMPREHEN METABOLIC PANEL: CPT

## 2020-05-28 PROCEDURE — 71046 X-RAY EXAM CHEST 2 VIEWS: CPT

## 2020-05-28 RX ORDER — SODIUM CHLORIDE 0.9 % (FLUSH) 0.9 %
10 SYRINGE (ML) INJECTION PRN
Status: DISCONTINUED | OUTPATIENT
Start: 2020-05-28 | End: 2020-05-29 | Stop reason: HOSPADM

## 2020-05-28 RX ORDER — FOLIC ACID 1 MG/1
500 TABLET ORAL DAILY
Status: DISCONTINUED | OUTPATIENT
Start: 2020-05-29 | End: 2020-05-29 | Stop reason: HOSPADM

## 2020-05-28 RX ORDER — SODIUM CHLORIDE 0.9 % (FLUSH) 0.9 %
10 SYRINGE (ML) INJECTION EVERY 12 HOURS SCHEDULED
Status: DISCONTINUED | OUTPATIENT
Start: 2020-05-28 | End: 2020-05-29 | Stop reason: HOSPADM

## 2020-05-28 RX ORDER — PROMETHAZINE HYDROCHLORIDE 25 MG/1
12.5 TABLET ORAL EVERY 6 HOURS PRN
Status: DISCONTINUED | OUTPATIENT
Start: 2020-05-28 | End: 2020-05-29 | Stop reason: HOSPADM

## 2020-05-28 RX ORDER — CLONAZEPAM 1 MG/1
1 TABLET ORAL 2 TIMES DAILY PRN
COMMUNITY
End: 2020-07-15

## 2020-05-28 RX ORDER — CLONAZEPAM 1 MG/1
1 TABLET ORAL 2 TIMES DAILY PRN
Status: DISCONTINUED | OUTPATIENT
Start: 2020-05-28 | End: 2020-05-29 | Stop reason: HOSPADM

## 2020-05-28 RX ORDER — PANTOPRAZOLE SODIUM 40 MG/1
40 TABLET, DELAYED RELEASE ORAL
Status: DISCONTINUED | OUTPATIENT
Start: 2020-05-29 | End: 2020-05-29 | Stop reason: HOSPADM

## 2020-05-28 RX ORDER — FLUCONAZOLE 150 MG/1
150 TABLET ORAL ONCE
COMMUNITY
End: 2020-07-15

## 2020-05-28 RX ORDER — ACETAMINOPHEN 325 MG/1
650 TABLET ORAL EVERY 6 HOURS PRN
Status: DISCONTINUED | OUTPATIENT
Start: 2020-05-28 | End: 2020-05-29 | Stop reason: HOSPADM

## 2020-05-28 RX ORDER — ONDANSETRON 2 MG/ML
4 INJECTION INTRAMUSCULAR; INTRAVENOUS EVERY 6 HOURS PRN
Status: DISCONTINUED | OUTPATIENT
Start: 2020-05-28 | End: 2020-05-29 | Stop reason: HOSPADM

## 2020-05-28 RX ORDER — ACETAMINOPHEN 650 MG/1
650 SUPPOSITORY RECTAL EVERY 6 HOURS PRN
Status: DISCONTINUED | OUTPATIENT
Start: 2020-05-28 | End: 2020-05-29 | Stop reason: HOSPADM

## 2020-05-28 RX ORDER — NITROFURANTOIN 25; 75 MG/1; MG/1
100 CAPSULE ORAL 2 TIMES DAILY
Qty: 20 CAPSULE | Refills: 0 | Status: SHIPPED | OUTPATIENT
Start: 2020-05-28 | End: 2020-06-07

## 2020-05-28 RX ORDER — LEVOTHYROXINE SODIUM 0.1 MG/1
100 TABLET ORAL DAILY
Status: DISCONTINUED | OUTPATIENT
Start: 2020-05-29 | End: 2020-05-29 | Stop reason: HOSPADM

## 2020-05-28 RX ORDER — POLYETHYLENE GLYCOL 3350 17 G/17G
17 POWDER, FOR SOLUTION ORAL DAILY PRN
Status: DISCONTINUED | OUTPATIENT
Start: 2020-05-28 | End: 2020-05-29 | Stop reason: HOSPADM

## 2020-05-28 RX ORDER — ASPIRIN 81 MG/1
81 TABLET, CHEWABLE ORAL DAILY
Status: DISCONTINUED | OUTPATIENT
Start: 2020-05-29 | End: 2020-05-29 | Stop reason: HOSPADM

## 2020-05-28 RX ORDER — ALBUTEROL SULFATE 90 UG/1
2 AEROSOL, METERED RESPIRATORY (INHALATION) EVERY 4 HOURS PRN
Status: DISCONTINUED | OUTPATIENT
Start: 2020-05-28 | End: 2020-05-29 | Stop reason: HOSPADM

## 2020-05-28 RX ADMIN — Medication 10 ML: at 23:00

## 2020-05-28 ASSESSMENT — HEART SCORE: ECG: 1

## 2020-05-28 NOTE — TELEPHONE ENCOUNTER
rx sent to the [facility] instead of [facility]. Pt cold transferred to my desk. Explained mistake. Phoned into [facility].

## 2020-05-28 NOTE — TELEPHONE ENCOUNTER
Left message for Grady Sinha to call back [facility].     Telephone Information:   [email or web address] [phone] (home)

## 2020-05-28 NOTE — TELEPHONE ENCOUNTER
Pt has multiple complaints that seem random or scattered but pt is also c/o pain and a UTI, muscle cramps, reoccurring viral infection. The patient's pattern of speech is fast and scattered as well. The disposition for this patient is to be seen today as the patient is insisting on seeing someone today b/c she is worried she may be having a stroke or heart attack. Connected pt with [facility] to make an appointment. Reason for Disposition   Patient wants to be seen    Answer Assessment - Initial Assessment Questions  1. ONSET: \"When did the pain start? \"      5/28  2. LOCATION: \"Where is the pain located? \"      Shoulder, radiates up neck into jaw  3. PAIN: \"How bad is the pain? \" (Scale 1-10; or mild, moderate, severe)    - MILD (1-3): doesn't interfere with normal activities    - MODERATE (4-7): interferes with normal activities (e.g., work or school) or awakens from sleep    - SEVERE (8-10): excruciating pain, unable to do any normal activities, unable to move arm at all due to pain      6  4. WORK OR EXERCISE: \"Has there been any recent work or exercise that involved this part of the body? \"      Was doing yoga  5. CAUSE: \"What do you think is causing the shoulder pain? \"      Also experiencing muscle cramps from possible dehydration  6. OTHER SYMPTOMS: \"Do you have any other symptoms? \" (e.g., neck pain, swelling, rash, fever, numbness, weakness)      Jaw numbness, muscle cramps  7. PREGNANCY: \"Is there any chance you are pregnant? \" \"When was your last menstrual period? \"      no    Protocols used: SHOULDER PAIN-ADULT-OH

## 2020-05-28 NOTE — ED PROVIDER NOTES
sexual activity: Not on file   Other Topics Concern    Not on file   Social History Narrative    Not on file     No current facility-administered medications for this encounter. Current Outpatient Medications   Medication Sig Dispense Refill    nitrofurantoin, macrocrystal-monohydrate, (MACROBID) 100 MG capsule Take 1 capsule by mouth 2 times daily for 10 days 20 capsule 0    clonazePAM (KLONOPIN) 1 MG tablet Take 1 mg by mouth 2 times daily as needed.  fluconazole (DIFLUCAN) 150 MG tablet Take 150 mg by mouth once 1 weekly as needed      omeprazole (PRILOSEC) 40 MG delayed release capsule Take 1 capsule by mouth every morning (before breakfast) for reflux/heartburn. 90 capsule 1    oxybutynin (DITROPAN) 5 MG tablet Take 0.5-1 tablets by mouth 3 times daily as needed (for overactive bladder) 90 tablet 1    ibuprofen (ADVIL;MOTRIN) 400 MG tablet TAKE 1 TO 2 TABLETS BY MOUTH EVERY 8 HOURS AS NEEDED FOR PAIN. TAKE WITH FOOD AND USE SPARINGLY 240 tablet 1    levothyroxine (SYNTHROID) 100 MCG tablet TAKE 1 TABLET EVERY DAY 90 tablet 1    Coenzyme Q10 200 MG CAPS Take 1 tablet by mouth daily 90 capsule 3    Estradiol (VAGIFEM) 10 MCG TABS vaginal tablet       folic acid (FOLVITE) 090 MCG tablet Take 400 mcg by mouth daily      Ascorbic Acid (VITAMIN C) 1000 MG tablet Take 1,000 mg by mouth daily      Calcium Carbonate Antacid 1000 MG CHEW Take by mouth      VENTOLIN  (90 Base) MCG/ACT inhaler INHALE 2 PUFFS EVERY 6 HOURS AS NEEDED FOR WHEEZING 54 g 3    Glucos-MSM-C-Sz-Adnuuu-Fxjrfi (BL MSM GLUCOSAMINE COMPLEX PO) Take by mouth      [employer]S VITAMIN D3 1000 units CAPS TAKE 1 SOFTGEL BY MOUTH DAILY 90 capsule 1    imiquimod (ALDARA) 5 % cream Apply topically three times a week Indications: [facility] ordering Apply topically three times a week.  12 each 3    lactase (LACTAID) 3000 UNITS tablet Take 1 tablet by mouth 3 times daily (with meals) 90 tablet 3    cetirizine (ZYRTEC ALLERGY) 10 MG tablet CTAB. Good air exchange. Speaking comfortably in full sentences. No wheezing, rhonchi, rales, stridor. CHEST: tenderness to palpation. ABDOMEN: Soft. Non-distended. Non-tender. No guarding or rebound. EXTREMITIES: No peripheral edema. Moves all extremities equally. All extremities neurovascularly intact. No acute deformities. Generalized tenderness and upper and lower extremities to palpation. \"Spasms\" reproduced with range of motion exercises particularly in the lower extremities. SKIN: Warm and dry. No acute rashes. NEUROLOGICAL: Alert and oriented. No gross facial drooping. Strength 5/5, sensation intact. PSYCHIATRIC: Anxious. Forced, pressured speech. SCREENINGS        NIH Score  NIH Stroke Scale  Interval: Baseline  Level of Consciousness (1a. ): Alert  LOC Questions (1b. ): Answers both correctly  LOC Commands (1c. ): Performs both tasks correctly  Best Gaze (2. ): Normal  Visual (3. ): No visual loss  Facial Palsy (4. ): Normal symmetrical movement  Motor Arm, Left (5a. ): No drift  Motor Arm, Right (5b. ): No drift  Motor Leg, Left (6a. ): No drift  Motor Leg, Right (6b. ): No drift  Limb Ataxia (7. ): Absent  Sensory (8. ): Normal  Best Language (9. ): No aphasia  Dysarthria (10. ): Normal  Extinction and Inattention (11): No abnormality  Total: 0    Heart Score  Heart Score for chest pain patients  History:  Moderately Suspicious  ECG: Non-Specifc repolarization disturbance/LBTB/PM  Patient Age: > 65 years  *Risk factors for Atherosclerotic disease: Hypercholesterolemia  Risk Factors: 1 or 2 risk factors  Troponin: < 1X normal limit  Heart Score Total: 5       RADIOLOGY  Xr Chest Standard (2 Vw)    Result Date: 5/28/2020  EXAMINATION: TWO XRAY VIEWS OF THE CHEST 5/28/2020 5:21 pm COMPARISON: 06/30/2017 and 11/05/2016 HISTORY: ORDERING SYSTEM PROVIDED HISTORY: left shoulder and jaw pain TECHNOLOGIST PROVIDED HISTORY: Reason for exam:->left shoulder and jaw pain Reason for Exam: left shoulder Bacteria, UA Rare (A) None Seen /HPF   Protime-INR   Result Value Ref Range    Protime 11.4 10.0 - 13.2 sec    INR 0.98 0.86 - 1.14   Troponin   Result Value Ref Range    Troponin <0.01 <0.01 ng/mL   Troponin   Result Value Ref Range    Troponin <0.01 <0.01 ng/mL   POCT Glucose   Result Value Ref Range    POC Glucose 106 (H) 70 - 99 mg/dl    Performed on ACCU-CHEK        I spoke with Elis Penaloza CNP. We thoroughly discussed the history, physical exam, laboratory and imaging studies, as well as, emergency department course. Based upon that discussion, we've decided to admit Bob Blandon for further observation and evaluation of Guera Mosley CVA-like symptoms. As I have deemed necessary from their history, physical and studies, I have considered and evaluated Bob Blandon for the following diagnoses:  DIABETES, INTRACRANIAL HEMORRHAGE, MENINGITIS, SUBARACHNOID HEMORRHAGE, SUBDURAL HEMATOMA, & STROKE. FINAL IMPRESSION  1. Numbness    2. Acute pain of left shoulder        Vitals:  Blood pressure 111/68, pulse 71, temperature 97.4 °F (36.3 °C), temperature source Oral, resp. rate 16, height 5' 4\" (1.626 m), SpO2 (!) 85 %, not currently breastfeeding. DISPOSITION  Patient was admitted to the hospital in stable condition. Comment: Please note this report has been produced using speech recognition software and may contain errors related to that system including errors in grammar, punctuation, and spelling, as well as words and phrases that may be inappropriate. If there are any questions or concerns please feel free to contact the dictating provider for clarification.             RUSLAN Mireles CNP  05/28/20 [id number]       Hodan Floyd MD  06/04/20 [address], [place]  06/04/20 [id number]

## 2020-05-28 NOTE — TELEPHONE ENCOUNTER
I spoke to Sherrell Romano, strongly advised her to be evaluated at the ED. She is concerned that she may have COVID. I stated Dr. Ricardo Restrepo concern for a stroke, the ED is the best place for this eval. Sherrell Romano stated understanding and head to the ED now. EROS Benavides.

## 2020-05-28 NOTE — ED TRIAGE NOTES
Patient low risk for fall. No history of falls but due to chief complaint patient made a fall risk. Bed alarm on bed. Patient gym shoes left on. Fall bracelet in place. Patient instructed to use call light for assistance.

## 2020-05-29 VITALS
OXYGEN SATURATION: 97 % | BODY MASS INDEX: 32.01 KG/M2 | RESPIRATION RATE: 16 BRPM | HEART RATE: 71 BPM | DIASTOLIC BLOOD PRESSURE: 68 MMHG | WEIGHT: 187.5 LBS | SYSTOLIC BLOOD PRESSURE: 106 MMHG | HEIGHT: 64 IN | TEMPERATURE: 92.1 F

## 2020-05-29 LAB
ANION GAP SERPL CALCULATED.3IONS-SCNC: 8 MMOL/L (ref 3–16)
BUN BLDV-MCNC: 16 MG/DL (ref 7–20)
CALCIUM SERPL-MCNC: 9.2 MG/DL (ref 8.3–10.6)
CHLORIDE BLD-SCNC: 104 MMOL/L (ref 99–110)
CO2: 27 MMOL/L (ref 21–32)
CREAT SERPL-MCNC: 0.9 MG/DL (ref 0.6–1.2)
EKG ATRIAL RATE: 59 BPM
EKG ATRIAL RATE: 63 BPM
EKG DIAGNOSIS: NORMAL
EKG DIAGNOSIS: NORMAL
EKG P AXIS: 65 DEGREES
EKG P AXIS: 66 DEGREES
EKG P-R INTERVAL: 236 MS
EKG P-R INTERVAL: 248 MS
EKG Q-T INTERVAL: 384 MS
EKG Q-T INTERVAL: 398 MS
EKG QRS DURATION: 76 MS
EKG QRS DURATION: 78 MS
EKG QTC CALCULATION (BAZETT): 392 MS
EKG QTC CALCULATION (BAZETT): 394 MS
EKG R AXIS: 33 DEGREES
EKG R AXIS: 36 DEGREES
EKG T AXIS: 38 DEGREES
EKG T AXIS: 53 DEGREES
EKG VENTRICULAR RATE: 59 BPM
EKG VENTRICULAR RATE: 63 BPM
GFR AFRICAN AMERICAN: >60
GFR NON-AFRICAN AMERICAN: >60
GLUCOSE BLD-MCNC: 133 MG/DL (ref 70–99)
HCT VFR BLD CALC: 34.4 % (ref 36–48)
HEMOGLOBIN: 11.8 G/DL (ref 12–16)
MCH RBC QN AUTO: 29.7 PG (ref 26–34)
MCHC RBC AUTO-ENTMCNC: 34.4 G/DL (ref 31–36)
MCV RBC AUTO: 86.2 FL (ref 80–100)
PDW BLD-RTO: 13.5 % (ref 12.4–15.4)
PLATELET # BLD: 233 K/UL (ref 135–450)
PMV BLD AUTO: 7.4 FL (ref 5–10.5)
POTASSIUM REFLEX MAGNESIUM: 3.9 MMOL/L (ref 3.5–5.1)
RBC # BLD: 3.99 M/UL (ref 4–5.2)
SODIUM BLD-SCNC: 139 MMOL/L (ref 136–145)
TROPONIN: <0.01 NG/ML
WBC # BLD: 5.6 K/UL (ref 4–11)

## 2020-05-29 PROCEDURE — 93005 ELECTROCARDIOGRAM TRACING: CPT | Performed by: NURSE PRACTITIONER

## 2020-05-29 PROCEDURE — 6360000002 HC RX W HCPCS: Performed by: INTERNAL MEDICINE

## 2020-05-29 PROCEDURE — 2580000003 HC RX 258: Performed by: INTERNAL MEDICINE

## 2020-05-29 PROCEDURE — 93017 CV STRESS TEST TRACING ONLY: CPT

## 2020-05-29 PROCEDURE — 80048 BASIC METABOLIC PNL TOTAL CA: CPT

## 2020-05-29 PROCEDURE — 6370000000 HC RX 637 (ALT 250 FOR IP): Performed by: NURSE PRACTITIONER

## 2020-05-29 PROCEDURE — 6370000000 HC RX 637 (ALT 250 FOR IP)

## 2020-05-29 PROCEDURE — 2580000003 HC RX 258: Performed by: NURSE PRACTITIONER

## 2020-05-29 PROCEDURE — 93010 ELECTROCARDIOGRAM REPORT: CPT | Performed by: INTERNAL MEDICINE

## 2020-05-29 PROCEDURE — G0378 HOSPITAL OBSERVATION PER HR: HCPCS

## 2020-05-29 PROCEDURE — 84484 ASSAY OF TROPONIN QUANT: CPT

## 2020-05-29 PROCEDURE — 85027 COMPLETE CBC AUTOMATED: CPT

## 2020-05-29 PROCEDURE — 93351 STRESS TTE COMPLETE: CPT

## 2020-05-29 PROCEDURE — 6360000004 HC RX CONTRAST MEDICATION: Performed by: NURSE PRACTITIONER

## 2020-05-29 RX ORDER — DOBUTAMINE HYDROCHLORIDE 200 MG/100ML
10 INJECTION INTRAVENOUS CONTINUOUS
Status: DISCONTINUED | OUTPATIENT
Start: 2020-05-29 | End: 2020-05-29 | Stop reason: SDUPTHER

## 2020-05-29 RX ORDER — METHOCARBAMOL 500 MG/1
500 TABLET, FILM COATED ORAL 3 TIMES DAILY
COMMUNITY
End: 2020-06-23 | Stop reason: SDUPTHER

## 2020-05-29 RX ADMIN — DOBUTAMINE 10 MCG/KG/MIN: 12.5 INJECTION, SOLUTION, CONCENTRATE INTRAVENOUS at 14:00

## 2020-05-29 RX ADMIN — Medication 10 ML: at 10:17

## 2020-05-29 RX ADMIN — CLONAZEPAM 1 MG: 1 TABLET ORAL at 00:39

## 2020-05-29 RX ADMIN — PERFLUTREN 2.2 MG: 6.52 INJECTION, SUSPENSION INTRAVENOUS at 14:21

## 2020-05-29 RX ADMIN — Medication: at 00:46

## 2020-05-29 ASSESSMENT — PAIN SCALES - GENERAL: PAINLEVEL_OUTOF10: 5

## 2020-05-29 NOTE — PROGRESS NOTES
Pt has mediset in room full of her home medications. Pt refuses to allow writer to give medications and has taken multiple pills, per pt both prescription and OTC, this AM.  She states she is going to take her home medications. She would not give medications to writer. Secure message sent to Dr. Evin Black \"Pt brought meds from home and is insistent upon taking them. Pt has taken multiple pills from her pill box that she states she takes daily. Writer unsure of what all she has taken. \"  Return call from MD stating that medications need to be taken from patient, by security if necessary, so patient does not take anymore. After much discussion and Dr. Evin Black at bedside, patient gave medications to writer. Medications put in bag and placed in lock box in med room after speaking with pharmacy for approval. Patient was very concerned about others touching her medications and them being taken out of her room or off of the floor given the \"plauge that's going on. \"

## 2020-05-29 NOTE — PROGRESS NOTES
Hospitalist Progress Note      PCP: Yulissa Morales MD    Date of Admission: 5/28/2020    Chief Complaint: Chest pain radiating to jaw    Hospital Course: Admitted with above-mentioned complaints. Awaiting stress test.  Currently asymptomatic. Subjective: No chest pain. No jaw pain, no shortness of breath, no nausea, no vomiting      Medications:  Reviewed    Infusion Medications    DOBUTamine (DOBUTREX) (for STRESS TEST USE ONLY)       Scheduled Medications    folic acid  229 mcg Oral Daily    levothyroxine  100 mcg Oral Daily    sodium chloride flush  10 mL Intravenous 2 times per day    aspirin  81 mg Oral Daily    pantoprazole  40 mg Oral QAM AC    enoxaparin  40 mg Subcutaneous Daily    nitroglycerin  1 inch Topical 4 times per day     PRN Meds: acetaminophen, clonazePAM, albuterol sulfate HFA, sodium chloride flush, acetaminophen **OR** acetaminophen, polyethylene glycol, promethazine **OR** ondansetron, perflutren lipid microspheres      Intake/Output Summary (Last 24 hours) at 5/29/2020 1409  Last data filed at 5/29/2020 1045  Gross per 24 hour   Intake 730 ml   Output 550 ml   Net 180 ml       Physical Exam Performed:    BP (!) 120/59   Pulse 59   Temp 98.7 °F (37.1 °C) (Oral)   Resp 18   Ht 5' 4\" (1.626 m)   Wt 187 lb 8 oz (85 kg)   LMP  (LMP Unknown)   SpO2 98%   BMI 32.18 kg/m²     General appearance: No apparent distress, appears stated age and cooperative. HEENT: Pupils equal, round, and reactive to light. Conjunctivae/corneas clear. Neck: Supple, with full range of motion. No jugular venous distention. Trachea midline. Respiratory:  Normal respiratory effort. Clear to auscultation, bilaterally without Rales/Wheezes/Rhonchi. Cardiovascular: Regular rate and rhythm with normal S1/S2 without murmurs, rubs or gallops. Abdomen: Soft, non-tender, non-distended with normal bowel sounds. Musculoskeletal: No clubbing, cyanosis or edema bilaterally.   Full range of motion indicated    Dispo -observation stay    Jose Enrique Nelson MD

## 2020-05-29 NOTE — CARE COORDINATION
CASE MANAGEMENT INITIAL ASSESSMENT      Reviewed chart and completed assessment via telephone with:patient  Explained Case Management role/services. Primary contact information: catrachita Reynoso    Admit date/status: OVA  Diagnosis: jaw pain  Is this a Readmission?: n     Insurance: [address] required for SNF - Y        3 night stay required - N    Living arrangements, Adls, care needs, prior to admission: lives in apartment alone    Transportation: private    [employer] at home: Walker__Cane__RTS__ BSC__Shower Chair__  02__ HHN__ CPAP__  BiPap__  Hospital Bed__ W/C___ Other__________    Services in the home and/or outpatient, prior to admission: none    Dialysis Facility (if applicable) none  · Name:  · Address:  · Dialysis Schedule:  · Phone:  · Fax:    PT/OT recs: none    Hospital Exemption Notification (HEN): not initaited    Barriers to discharge: none    Plan/comments: Patient plans on returning home at discharge. Ambulatory in halls, gait steady. Stress test pending, if normal, patietn will have no DCP needs.  Angie Perdomo RN      ECOC on chart for MD signature

## 2020-05-29 NOTE — PROGRESS NOTES
greater than 140 beats per minute, pending direction from physician. 3. Bronchodilators will be administered via Metered Dose Inhaler (MDI) with spacer when the following criteria are met:  a. Alert and cooperative     b. HR < 140 bpm  c. RR < 30 bpm                d. Can demonstrate a 2-3 second inspiratory hold  4. Bronchodilators will be administered via Hand Held Nebulizer [facility]) to patients when ANY of the following criteria are met  a. Incognizant or uncooperative          b. Patients treated with HHN at Home        c. Unable to demonstrate proper use of MDI with spacer     d. RR > 30 bpm   5. Bronchodilators will be delivered via Metered Dose Inhaler (MDI), HHN, Aerogen to intubated patients on mechanical ventilation. 6. Inhalation medication orders will be delivered and/or substituted as outlined below. Aerosolized Medications Ordering and Administration Guidelines:    1. All Medications will be ordered by a physician, and their frequency and/or modality will be adjusted as defined by the patients Respiratory Severity Index (RSI) score. 2. If the patient does not have documented COPD, consider discontinuing anticholinergics when RSI is less than 9.  3. If the bronchospasm worsens (increased RSI), then the bronchodilator frequency can be increased to a maximum of every 4 hours. If greater than every 4 hours is required, the physician will be contacted. 4. If the bronchospasm improves, the frequency of the bronchodilator can be decreased, based on the patient's RSI, but not less than home treatment regimen frequency. 5. Bronchodilator(s) will be discontinued if patient has a RSI less than 9 and has received no scheduled or as needed treatment for 72  Hrs. Patients Ordered on a Mucolytic Agent:    1. Must always be administered with a bronchodilator.     2. Discontinue if patient experiences worsened bronchospasm, or secretions have lessened to the point that the patient is able to clear them with a cough.    Anti-inflammatory and Combination Medications:    1. If the patient lacks prior history of lung disease, is not using inhaled anti-inflammatory medication at home, and lacks wheezing by examination or by history for at least 24 hours, contact physician for possible discontinuation.

## 2020-05-29 NOTE — ED NOTES
[facility] at 2018  Chiqui per CAITLYN Wilson NP spoke with Kat Linda NP at 2031     [facility]  05/28/20 2052

## 2020-06-01 ENCOUNTER — TELEPHONE (OUTPATIENT)
Dept: FAMILY MEDICINE CLINIC | Age: 70
End: 2020-06-01

## 2020-06-01 NOTE — TELEPHONE ENCOUNTER
[facility] Transitions Initial Follow Up Call    Call within 2 business days of discharge: Yes     Patient: Marilyn Chacko Patient : 1950 MRN: [id number]        RARS: No data recorded     Spoke with: Left message    Discharge department/facility: [facility]    Non-face-to-face services provided:       Follow Up  Future Appointments   Date Time Provider Lopez Coronel   7/10/2020  3:30 PM Kami Ramos MD [address]   7/15/2020  8:45 AM MD MARU Encinas  Renetta Sender, LPN

## 2020-06-24 RX ORDER — METHOCARBAMOL 500 MG/1
500 TABLET, FILM COATED ORAL 3 TIMES DAILY
Qty: 90 TABLET | Refills: 0 | Status: SHIPPED | OUTPATIENT
Start: 2020-06-24 | End: 2020-07-15 | Stop reason: SDUPTHER

## 2020-07-08 RX ORDER — IBUPROFEN 400 MG/1
TABLET ORAL
Qty: 240 TABLET | Refills: 1 | Status: SHIPPED | OUTPATIENT
Start: 2020-07-08 | End: 2020-07-15 | Stop reason: SDUPTHER

## 2020-07-08 NOTE — TELEPHONE ENCOUNTER
Last office visit 3/12/2020     Last written 1-6-2020 3 month x 1 refill - Dr. Abena Arreguin ordered    Next office visit scheduled 7/15/2020    Requested Prescriptions     Pending Prescriptions Disp Refills    ibuprofen (ADVIL;MOTRIN) 400 MG tablet 240 tablet 1     Sig: TAKE 1 TO 2 TABLETS BY MOUTH EVERY 8 HOURS AS NEEDED FOR PAIN.  TAKE WITH FOOD AND USE SPARINGLY

## 2020-07-15 ENCOUNTER — TELEPHONE (OUTPATIENT)
Dept: FAMILY MEDICINE CLINIC | Age: 70
End: 2020-07-15

## 2020-07-15 DIAGNOSIS — E55.9 VITAMIN D DEFICIENCY, UNSPECIFIED: ICD-10-CM

## 2020-07-15 DIAGNOSIS — Z00.00 HEALTHCARE MAINTENANCE: ICD-10-CM

## 2020-07-15 LAB
A/G RATIO: 1.9 (ref 1.1–2.2)
ALBUMIN SERPL-MCNC: 4.4 G/DL (ref 3.4–5)
ALP BLD-CCNC: 53 U/L (ref 40–129)
ALT SERPL-CCNC: 14 U/L (ref 10–40)
ANION GAP SERPL CALCULATED.3IONS-SCNC: 11 MMOL/L (ref 3–16)
AST SERPL-CCNC: 17 U/L (ref 15–37)
BASOPHILS ABSOLUTE: 0 K/UL (ref 0–0.2)
BASOPHILS RELATIVE PERCENT: 0.7 %
BILIRUB SERPL-MCNC: <0.2 MG/DL (ref 0–1)
BUN BLDV-MCNC: 22 MG/DL (ref 7–20)
CALCIUM SERPL-MCNC: 9.6 MG/DL (ref 8.3–10.6)
CHLORIDE BLD-SCNC: 99 MMOL/L (ref 99–110)
CHOLESTEROL, TOTAL: 200 MG/DL (ref 0–199)
CO2: 28 MMOL/L (ref 21–32)
CREAT SERPL-MCNC: 0.7 MG/DL (ref 0.6–1.2)
EOSINOPHILS ABSOLUTE: 0.1 K/UL (ref 0–0.6)
EOSINOPHILS RELATIVE PERCENT: 2.1 %
GFR AFRICAN AMERICAN: >60
GFR NON-AFRICAN AMERICAN: >60
GLOBULIN: 2.3 G/DL
GLUCOSE BLD-MCNC: 99 MG/DL (ref 70–99)
HCT VFR BLD CALC: 40 % (ref 36–48)
HDLC SERPL-MCNC: 61 MG/DL (ref 40–60)
HEMOGLOBIN: 13.1 G/DL (ref 12–16)
LDL CHOLESTEROL CALCULATED: 114 MG/DL
LYMPHOCYTES ABSOLUTE: 2.3 K/UL (ref 1–5.1)
LYMPHOCYTES RELATIVE PERCENT: 59.8 %
MCH RBC QN AUTO: 28.7 PG (ref 26–34)
MCHC RBC AUTO-ENTMCNC: 32.7 G/DL (ref 31–36)
MCV RBC AUTO: 87.8 FL (ref 80–100)
MONOCYTES ABSOLUTE: 0.4 K/UL (ref 0–1.3)
MONOCYTES RELATIVE PERCENT: 9.4 %
NEUTROPHILS ABSOLUTE: 1.1 K/UL (ref 1.7–7.7)
NEUTROPHILS RELATIVE PERCENT: 28 %
PDW BLD-RTO: 14.2 % (ref 12.4–15.4)
PLATELET # BLD: 232 K/UL (ref 135–450)
PMV BLD AUTO: 8 FL (ref 5–10.5)
POTASSIUM SERPL-SCNC: 4.6 MMOL/L (ref 3.5–5.1)
RBC # BLD: 4.56 M/UL (ref 4–5.2)
SODIUM BLD-SCNC: 138 MMOL/L (ref 136–145)
TOTAL PROTEIN: 6.7 G/DL (ref 6.4–8.2)
TRIGL SERPL-MCNC: 124 MG/DL (ref 0–150)
TSH REFLEX: 1.68 UIU/ML (ref 0.27–4.2)
VITAMIN D 25-HYDROXY: 41.1 NG/ML
VLDLC SERPL CALC-MCNC: 25 MG/DL
WBC # BLD: 3.9 K/UL (ref 4–11)

## 2020-07-15 RX ORDER — MONTELUKAST SODIUM 10 MG/1
10 TABLET ORAL NIGHTLY
Qty: 30 TABLET | Refills: 0 | Status: SHIPPED | OUTPATIENT
Start: 2020-07-15 | End: 2020-08-04 | Stop reason: SDUPTHER

## 2020-07-15 RX ORDER — ALBUTEROL SULFATE 90 UG/1
2 AEROSOL, METERED RESPIRATORY (INHALATION) EVERY 6 HOURS PRN
Qty: 54 G | Refills: 0 | Status: SHIPPED | OUTPATIENT
Start: 2020-07-15 | End: 2020-10-01 | Stop reason: SDUPTHER

## 2020-07-15 RX ORDER — OXYBUTYNIN CHLORIDE 5 MG/1
2.5-5 TABLET ORAL 3 TIMES DAILY PRN
Qty: 90 TABLET | Refills: 0 | Status: CANCELLED | OUTPATIENT
Start: 2020-07-15

## 2020-07-15 RX ORDER — LEVOTHYROXINE SODIUM 0.1 MG/1
TABLET ORAL
Qty: 30 TABLET | Refills: 0 | Status: CANCELLED | OUTPATIENT
Start: 2020-07-15

## 2020-07-15 RX ORDER — OXYBUTYNIN CHLORIDE 5 MG/1
2.5-5 TABLET ORAL 3 TIMES DAILY PRN
Qty: 30 TABLET | Refills: 0 | Status: SHIPPED | OUTPATIENT
Start: 2020-07-15 | End: 2020-08-04 | Stop reason: SDUPTHER

## 2020-07-15 RX ORDER — LEVOTHYROXINE SODIUM 0.1 MG/1
TABLET ORAL
Qty: 30 TABLET | Refills: 0 | Status: SHIPPED | OUTPATIENT
Start: 2020-07-15 | End: 2020-08-04 | Stop reason: SDUPTHER

## 2020-07-15 RX ORDER — IBUPROFEN 400 MG/1
TABLET ORAL
Qty: 180 TABLET | Refills: 0 | Status: SHIPPED | OUTPATIENT
Start: 2020-07-15 | End: 2021-07-30

## 2020-07-15 RX ORDER — OMEPRAZOLE 40 MG/1
40 CAPSULE, DELAYED RELEASE ORAL
Qty: 30 CAPSULE | Refills: 0 | Status: SHIPPED | OUTPATIENT
Start: 2020-07-15 | End: 2020-08-04 | Stop reason: SDUPTHER

## 2020-07-15 RX ORDER — METHOCARBAMOL 500 MG/1
500 TABLET, FILM COATED ORAL 3 TIMES DAILY
Qty: 90 TABLET | Refills: 0 | Status: SHIPPED | OUTPATIENT
Start: 2020-07-15 | End: 2020-09-28 | Stop reason: SDUPTHER

## 2020-07-15 RX ORDER — TRIAMCINOLONE ACETONIDE 55 UG/1
2 SPRAY, METERED NASAL DAILY
Qty: 1 INHALER | Refills: 0 | Status: SHIPPED | OUTPATIENT
Start: 2020-07-15 | End: 2021-07-30

## 2020-07-15 NOTE — TELEPHONE ENCOUNTER
Pt arrives late for her appt. Loud and complaining behavior in lobby and with staff. Demanding to be seen and have her labs drawn. History of late to appts and no shows. Complaint that she was sent to ED and hospital for suspected stroke sxs recently. Advised pt that she arrived half way into her appt time and would not be seen. She was advised she would be able to get labs drawn today and PCP Yaneth Almonte would refill her requested meds. She was given letter of dismissal in hand by this RN and advised to seek medical PCP elsewhere utilizing the call center number for provider referrals - Patient voices understanding. She is advised if issues finding a provider to contact this RN.

## 2020-07-15 NOTE — TELEPHONE ENCOUNTER
Alert came up on Robaxin d/t age - Recommended tizanidine  Robaxin last filled on 7/8/20. Please check dosage and sig on rx.

## 2020-07-16 LAB
ESTIMATED AVERAGE GLUCOSE: 91.1 MG/DL
HBA1C MFR BLD: 4.8 %

## 2020-07-20 RX ORDER — MONTELUKAST SODIUM 10 MG/1
TABLET ORAL
Qty: 30 TABLET | Refills: 0 | OUTPATIENT
Start: 2020-07-20

## 2020-07-20 RX ORDER — LEVOTHYROXINE SODIUM 0.1 MG/1
TABLET ORAL
Qty: 30 TABLET | Refills: 0 | OUTPATIENT
Start: 2020-07-20

## 2020-08-04 ENCOUNTER — TELEPHONE (OUTPATIENT)
Dept: FAMILY MEDICINE CLINIC | Age: 70
End: 2020-08-04

## 2020-08-04 ENCOUNTER — OFFICE VISIT (OUTPATIENT)
Dept: INTERNAL MEDICINE CLINIC | Age: 70
End: 2020-08-04

## 2020-08-04 VITALS
SYSTOLIC BLOOD PRESSURE: 140 MMHG | DIASTOLIC BLOOD PRESSURE: 80 MMHG | TEMPERATURE: 98.2 F | WEIGHT: 200 LBS | HEIGHT: 64 IN | HEART RATE: 66 BPM | BODY MASS INDEX: 34.15 KG/M2 | OXYGEN SATURATION: 99 %

## 2020-08-04 PROCEDURE — 99203 OFFICE O/P NEW LOW 30 MIN: CPT | Performed by: FAMILY MEDICINE

## 2020-08-04 RX ORDER — AZITHROMYCIN 250 MG/1
250 TABLET, FILM COATED ORAL SEE ADMIN INSTRUCTIONS
Qty: 6 TABLET | Refills: 0 | Status: SHIPPED | OUTPATIENT
Start: 2020-08-04 | End: 2020-08-09

## 2020-08-04 RX ORDER — LEVOTHYROXINE SODIUM 0.1 MG/1
TABLET ORAL
Qty: 90 TABLET | Refills: 1 | Status: SHIPPED | OUTPATIENT
Start: 2020-08-04 | End: 2020-10-26 | Stop reason: SDUPTHER

## 2020-08-04 RX ORDER — MONTELUKAST SODIUM 10 MG/1
10 TABLET ORAL NIGHTLY
Qty: 90 TABLET | Refills: 1 | Status: SHIPPED | OUTPATIENT
Start: 2020-08-04 | End: 2020-10-26 | Stop reason: SDUPTHER

## 2020-08-04 RX ORDER — OMEPRAZOLE 40 MG/1
40 CAPSULE, DELAYED RELEASE ORAL
Qty: 90 CAPSULE | Refills: 1 | Status: SHIPPED | OUTPATIENT
Start: 2020-08-04 | End: 2020-09-03 | Stop reason: SINTOL

## 2020-08-04 RX ORDER — OXYBUTYNIN CHLORIDE 5 MG/1
5 TABLET ORAL 2 TIMES DAILY
Qty: 180 TABLET | Refills: 1 | Status: SHIPPED | OUTPATIENT
Start: 2020-08-04 | End: 2021-08-31

## 2020-08-04 RX ORDER — IBUPROFEN 400 MG/1
TABLET ORAL
Qty: 180 TABLET | Refills: 0 | Status: SHIPPED | OUTPATIENT
Start: 2020-08-04 | End: 2021-01-07 | Stop reason: SDUPTHER

## 2020-08-04 NOTE — TELEPHONE ENCOUNTER
Patient called today to confirm where her future appointment is. She will be a new to provider with you on 8-18-20. Multiple providers, let go from one, just seen today by provider and she is switching. Over lapping in scheduling with multiple offices. Please review chart and advise to keep the appointment.

## 2020-08-04 NOTE — PROGRESS NOTES
Chief Complaint   Patient presents with   Maribel Booker Doctor     let go from her previous practice. has insurance needs refills    Allergies     taking singulair, otc nasal spray and using saline to flush at night time.  Anxiety     due to COVID, states she took clonazepam in the past. lives alone      Was following with Teri -   Discharged [facility] practice  Requesting hydroxychloroquine-  Has a friend who advising her to take it. ... lengthy discussion re R>B and not indicated, pt not happy  Asking about clonazepam for anxiety. Refuses to try any other meds such as SSRIs or other, upset that we wont be filling and will not try anything else  Just had labs in July  \"just refill my meds\"  Sinus pain, pressure and purulent rhinorrhea ongoing 2 wks.   Bad seasonal allergies uncontrolled,    Past Medical History:   Diagnosis Date    Allergic rhinitis     Anxiety     Asthma     Bipolar 1 disorder ([address]     questionable dx per patient    Chronic low back pain     COPD (chronic obstructive pulmonary disease) (HCC)     Decreased bone density 9-9-2008    GERD (gastroesophageal reflux disease)     H/O vulvar dysplasia 1/9/2015    VERONICA 3 wide local excision     Hyperlipidemia     Hypothyroid     Hypothyroidism     Insomnia     Osteoarthritis     Urinary incontinence        Patient Active Problem List   Diagnosis    Hypothyroid    GERD (gastroesophageal reflux disease)    Hyperlipidemia    Tobacco use disorder    H/O vulvar dysplasia    Localized osteoarthrosis, lower leg    Vaginal atrophy    DDD (degenerative disc disease), lumbar    Osteoarthritis of both hips    Condyloma    Lumbosacral spondylosis without myelopathy    Displacement of lumbar intervertebral disc without myelopathy    First degree hemorrhoids    Anxiety    Primary insomnia    Chronic midline thoracic back pain    Mild intermittent asthma without complication    Tonsil stone    Centrilobular emphysema ([address]    Jaw pain     Current Outpatient Medications on File Prior to Visit   Medication Sig Dispense Refill    methocarbamol (ROBAXIN) 500 MG tablet Take 1 tablet by mouth 3 times daily 90 tablet 0    albuterol sulfate HFA (VENTOLIN HFA) 108 (90 Base) MCG/ACT inhaler Inhale 2 puffs into the lungs every 6 hours as needed for Wheezing 54 g 0    triamcinolone (NASACORT ALLERGY 24HR) 55 MCG/ACT nasal inhaler 2 sprays by Each Nostril route daily 1 Inhaler 0    ibuprofen (ADVIL;MOTRIN) 400 MG tablet TAKE 1 TO 2 TABLETS BY MOUTH EVERY 8 HOURS AS NEEDED FOR PAIN. TAKE WITH FOOD AND USE SPARINGLY 180 tablet 0    Omega-3 Fatty Acids (FISH OIL) 1000 MG CAPS Take 3,000 mg by mouth 2 times daily      Coenzyme Q10 200 MG CAPS Take 1 tablet by mouth daily 90 capsule 3    Estradiol (VAGIFEM) 10 MCG TABS vaginal tablet       folic acid (FOLVITE) 511 MCG tablet Take 400 mcg by mouth daily      Zinc 50 MG TABS Take by mouth      Ascorbic Acid (VITAMIN C) 1000 MG tablet Take 1,000 mg by mouth daily      Calcium Carbonate Antacid 1000 MG CHEW Take by mouth      Glucos-MSM-C-Is-Ayvhcz-Qafqqa (BL MSM GLUCOSAMINE COMPLEX PO) Take by mouth      [employer]S VITAMIN D3 1000 units CAPS TAKE 1 SOFTGEL BY MOUTH DAILY 90 capsule 1    Psyllium (METAMUCIL) 28.3 % POWD Take 1 each by mouth daily      lactase (LACTAID) 3000 UNITS tablet Take 1 tablet by mouth 3 times daily (with meals) 90 tablet 3    hydrocortisone (ANUSOL-HC) 2.5 % rectal cream Place rectally 2 times daily. 1 Tube 1    acetaminophen (TYLENOL) 325 MG tablet Take 650 mg by mouth every 6 hours as needed for Pain      imiquimod (ALDARA) 5 % cream Apply topically three times a week Indications: [facility] ordering Apply topically three times a week. 12 each 3    [DISCONTINUED] tolterodine (DETROL LA) 4 MG ER capsule Take 1 capsule by mouth daily. 90 capsule 1     No current facility-administered medications on file prior to visit.       BP (!) 140/80 (Site: Left Upper Arm, Position: Sitting) Pulse 66   Temp 98.2 °F (36.8 °C)   Ht 5' 4\" (1.626 m)   Wt 200 lb (90.7 kg)   LMP  (LMP Unknown)   SpO2 99%   BMI 34.33 kg/m²     A+Ox4, NAD, WD/WN  Conj clear, no icterus, TMs clear  OP clear  Neck supple, no LAD  Lungs CTA B  CV: RRR, no M/R/G, nl S1S  Abd: soft, NT/ND  Extr: No edema  Skin: warm dry, no rash no obvious lesions    Assessment/plan:     Herbert Spicer was seen today for established new doctor, allergies and anxiety. Diagnoses and all orders for this visit:    Acute non-recurrent sinusitis, unspecified location  -     azithromycin (ZITHROMAX) 250 MG tablet; Take 1 tablet by mouth See Admin Instructions for 5 days 500mg on day 1 followed by 250mg on days 2 - 5    Hypothyroidism, unspecified type  -     levothyroxine (SYNTHROID) 100 MCG tablet; TAKE 1 TABLET EVERY DAY    OAB (overactive bladder)  -     oxybutynin (DITROPAN) 5 MG tablet; Take 1 tablet by mouth 2 times daily    Gastroesophageal reflux disease without esophagitis  -     omeprazole (PRILOSEC) 40 MG delayed release capsule; Take 1 capsule by mouth every morning (before breakfast) for reflux/heartburn. Chronic bilateral low back pain without sciatica    Other orders  -     montelukast (SINGULAIR) 10 MG tablet; Take 1 tablet by mouth nightly  -     ibuprofen (ADVIL;MOTRIN) 400 MG tablet; 1-2 po tid prn with food      Advise getting established w/ regular PMD given requests for controlled medications, need for close f/u.

## 2020-08-05 RX ORDER — METHOCARBAMOL 500 MG/1
TABLET, FILM COATED ORAL
Qty: 90 TABLET | Refills: 0 | OUTPATIENT
Start: 2020-08-05

## 2020-08-18 ENCOUNTER — OFFICE VISIT (OUTPATIENT)
Dept: FAMILY MEDICINE CLINIC | Age: 70
End: 2020-08-18
Payer: MEDICARE

## 2020-08-18 VITALS
RESPIRATION RATE: 18 BRPM | TEMPERATURE: 96.7 F | DIASTOLIC BLOOD PRESSURE: 76 MMHG | WEIGHT: 196.4 LBS | SYSTOLIC BLOOD PRESSURE: 112 MMHG | HEART RATE: 59 BPM | HEIGHT: 64 IN | OXYGEN SATURATION: 98 % | BODY MASS INDEX: 33.53 KG/M2

## 2020-08-18 PROBLEM — N39.46 MIXED STRESS AND URGE URINARY INCONTINENCE: Status: ACTIVE | Noted: 2020-08-18

## 2020-08-18 PROCEDURE — 99214 OFFICE O/P EST MOD 30 MIN: CPT | Performed by: FAMILY MEDICINE

## 2020-08-18 PROCEDURE — G0444 DEPRESSION SCREEN ANNUAL: HCPCS | Performed by: FAMILY MEDICINE

## 2020-08-18 SDOH — SOCIAL STABILITY: SOCIAL INSECURITY
WITHIN THE LAST YEAR, HAVE YOU BEEN HUMILIATED OR EMOTIONALLY ABUSED IN OTHER WAYS BY YOUR PARTNER OR EX-PARTNER?: PATIENT DECLINED

## 2020-08-18 SDOH — SOCIAL STABILITY: SOCIAL INSECURITY
WITHIN THE LAST YEAR, HAVE TO BEEN RAPED OR FORCED TO HAVE ANY KIND OF SEXUAL ACTIVITY BY YOUR PARTNER OR EX-PARTNER?: PATIENT DECLINED

## 2020-08-18 SDOH — ECONOMIC STABILITY: TRANSPORTATION INSECURITY
IN THE PAST 12 MONTHS, HAS THE LACK OF TRANSPORTATION KEPT YOU FROM MEDICAL APPOINTMENTS OR FROM GETTING MEDICATIONS?: NO

## 2020-08-18 SDOH — SOCIAL STABILITY: SOCIAL NETWORK: ARE YOU MARRIED, WIDOWED, DIVORCED, SEPARATED, NEVER MARRIED, OR LIVING WITH A PARTNER?: DIVORCED

## 2020-08-18 SDOH — SOCIAL STABILITY: SOCIAL NETWORK: HOW OFTEN DO YOU GET TOGETHER WITH FRIENDS OR RELATIVES?: NEVER

## 2020-08-18 SDOH — HEALTH STABILITY: PHYSICAL HEALTH: ON AVERAGE, HOW MANY DAYS PER WEEK DO YOU ENGAGE IN MODERATE TO STRENUOUS EXERCISE (LIKE A BRISK WALK)?: 1 DAY

## 2020-08-18 SDOH — SOCIAL STABILITY: SOCIAL NETWORK: HOW OFTEN DO YOU ATTENT MEETINGS OF THE CLUB OR ORGANIZATION YOU BELONG TO?: NEVER

## 2020-08-18 SDOH — SOCIAL STABILITY: SOCIAL NETWORK
DO YOU BELONG TO ANY CLUBS OR ORGANIZATIONS SUCH AS CHURCH GROUPS UNIONS, FRATERNAL OR ATHLETIC GROUPS, OR SCHOOL GROUPS?: NO

## 2020-08-18 SDOH — SOCIAL STABILITY: SOCIAL NETWORK: HOW OFTEN DO YOU ATTEND CHURCH OR RELIGIOUS SERVICES?: NEVER

## 2020-08-18 SDOH — SOCIAL STABILITY: SOCIAL INSECURITY
WITHIN THE LAST YEAR, HAVE YOU BEEN KICKED, HIT, SLAPPED, OR OTHERWISE PHYSICALLY HURT BY YOUR PARTNER OR EX-PARTNER?: PATIENT DECLINED

## 2020-08-18 SDOH — SOCIAL STABILITY: SOCIAL NETWORK: IN A TYPICAL WEEK, HOW MANY TIMES DO YOU TALK ON THE PHONE WITH FAMILY, FRIENDS, OR NEIGHBORS?: TWICE A WEEK

## 2020-08-18 SDOH — ECONOMIC STABILITY: INCOME INSECURITY: HOW HARD IS IT FOR YOU TO PAY FOR THE VERY BASICS LIKE FOOD, HOUSING, MEDICAL CARE, AND HEATING?: NOT HARD AT ALL

## 2020-08-18 SDOH — ECONOMIC STABILITY: FOOD INSECURITY: WITHIN THE PAST 12 MONTHS, THE FOOD YOU BOUGHT JUST DIDN'T LAST AND YOU DIDN'T HAVE MONEY TO GET MORE.: NEVER TRUE

## 2020-08-18 SDOH — HEALTH STABILITY: MENTAL HEALTH
STRESS IS WHEN SOMEONE FEELS TENSE, NERVOUS, ANXIOUS, OR CAN'T SLEEP AT NIGHT BECAUSE THEIR MIND IS TROUBLED. HOW STRESSED ARE YOU?: VERY MUCH

## 2020-08-18 SDOH — ECONOMIC STABILITY: TRANSPORTATION INSECURITY
IN THE PAST 12 MONTHS, HAS LACK OF TRANSPORTATION KEPT YOU FROM MEETINGS, WORK, OR FROM GETTING THINGS NEEDED FOR DAILY LIVING?: NO

## 2020-08-18 SDOH — HEALTH STABILITY: PHYSICAL HEALTH: ON AVERAGE, HOW MANY MINUTES DO YOU ENGAGE IN EXERCISE AT THIS LEVEL?: NOT ASKED

## 2020-08-18 SDOH — SOCIAL STABILITY: SOCIAL INSECURITY: WITHIN THE LAST YEAR, HAVE YOU BEEN AFRAID OF YOUR PARTNER OR EX-PARTNER?: PATIENT DECLINED

## 2020-08-18 SDOH — ECONOMIC STABILITY: FOOD INSECURITY: WITHIN THE PAST 12 MONTHS, YOU WORRIED THAT YOUR FOOD WOULD RUN OUT BEFORE YOU GOT MONEY TO BUY MORE.: NEVER TRUE

## 2020-08-18 ASSESSMENT — PATIENT HEALTH QUESTIONNAIRE - PHQ9
6. FEELING BAD ABOUT YOURSELF - OR THAT YOU ARE A FAILURE OR HAVE LET YOURSELF OR YOUR FAMILY DOWN: 0
9. THOUGHTS THAT YOU WOULD BE BETTER OFF DEAD, OR OF HURTING YOURSELF: 0
10. IF YOU CHECKED OFF ANY PROBLEMS, HOW DIFFICULT HAVE THESE PROBLEMS MADE IT FOR YOU TO DO YOUR WORK, TAKE CARE OF THINGS AT HOME, OR GET ALONG WITH OTHER PEOPLE: 0
1. LITTLE INTEREST OR PLEASURE IN DOING THINGS: 0
SUM OF ALL RESPONSES TO PHQ QUESTIONS 1-9: 0
3. TROUBLE FALLING OR STAYING ASLEEP: 0
8. MOVING OR SPEAKING SO SLOWLY THAT OTHER PEOPLE COULD HAVE NOTICED. OR THE OPPOSITE, BEING SO FIGETY OR RESTLESS THAT YOU HAVE BEEN MOVING AROUND A LOT MORE THAN USUAL: 0
SUM OF ALL RESPONSES TO PHQ QUESTIONS 1-9: 0
SUM OF ALL RESPONSES TO PHQ9 QUESTIONS 1 & 2: 0
2. FEELING DOWN, DEPRESSED OR HOPELESS: 0
5. POOR APPETITE OR OVEREATING: 1
2. FEELING DOWN, DEPRESSED OR HOPELESS: 0
4. FEELING TIRED OR HAVING LITTLE ENERGY: 0
SUM OF ALL RESPONSES TO PHQ QUESTIONS 1-9: 1
7. TROUBLE CONCENTRATING ON THINGS, SUCH AS READING THE NEWSPAPER OR WATCHING TELEVISION: 0
SUM OF ALL RESPONSES TO PHQ QUESTIONS 1-9: 1

## 2020-08-18 ASSESSMENT — ENCOUNTER SYMPTOMS
SHORTNESS OF BREATH: 0
RHINORRHEA: 0
EYE DISCHARGE: 0
DIARRHEA: 0
BACK PAIN: 1
COUGH: 0
CONSTIPATION: 0
BLOOD IN STOOL: 0
EYE PAIN: 0
ABDOMINAL PAIN: 0
CHEST TIGHTNESS: 0
WHEEZING: 0
COLOR CHANGE: 0
SORE THROAT: 0

## 2020-08-18 ASSESSMENT — ANXIETY QUESTIONNAIRES
1. FEELING NERVOUS, ANXIOUS, OR ON EDGE: 2-OVER HALF THE DAYS
5. BEING SO RESTLESS THAT IT IS HARD TO SIT STILL: 1-SEVERAL DAYS
GAD7 TOTAL SCORE: 9
4. TROUBLE RELAXING: 1-SEVERAL DAYS
3. WORRYING TOO MUCH ABOUT DIFFERENT THINGS: 1-SEVERAL DAYS
2. NOT BEING ABLE TO STOP OR CONTROL WORRYING: 2-OVER HALF THE DAYS
6. BECOMING EASILY ANNOYED OR IRRITABLE: 2-OVER HALF THE DAYS
7. FEELING AFRAID AS IF SOMETHING AWFUL MIGHT HAPPEN: 0-NOT AT ALL

## 2020-08-18 NOTE — PROGRESS NOTES
SUBJECTIVE:  Chief Complaint   Patient presents with    Established New Doctor    Hyperlipidemia     intolerant to statins    Anxiety     no daily medication; not on daily, was on klonopin in past    Hypothyroidism    Gastroesophageal Reflux    Incontinence    Pain    Asthma    Allergies    Vulvar Cancer    Immunizations     due for pneumovax    Health Maintenance     HPI    Windy Pak is a 78 y. o.female that presents today for establish care. Anxiety:  Took Klonopin in past. No Rx since April 2020.   Does not take anything currently  PHQ-9 Total Score: 1 (8/18/2020  1:14 PM)  Thoughts that you would be better off dead, or of hurting yourself in some way: 0 (8/18/2020  1:14 PM)    PADMINI 7 SCORE 8/18/2020   PADMINI-7 Total Score 9     Hypothyroid:  Lab Results   Component Value Date    TSH 2.23 07/16/2019       Lab Results   Component Value Date    TSH 2.23 07/16/2019    I9SFEBM 1.22 08/13/2013    T4FREE 1.5 07/16/2019     Takes synthroid 100 mcg daily    Urinary Incontinence:  Oxybutynin 5 mg BID    GERD:  Omeprazole 40 mg daily    Hyperlipidemia:  Omega 3 fatty acid fish oil  Coenzyme q 10  Intolerant to statins  The 10-year ASCVD risk score (Stacey Franco et al., 2013) is: 5.8%    Values used to calculate the score:      Age: 78 years      Sex: Female      Is Non- : No      Diabetic: No      Tobacco smoker: No      Systolic Blood Pressure: 673 mmHg      Is BP treated: No      HDL Cholesterol: 61 mg/dL      Total Cholesterol: 200 mg/dL    Lab Results   Component Value Date    CHOL 200 (H) 07/15/2020    CHOL 210 (H) 07/16/2019    CHOL 189 10/04/2017     Lab Results   Component Value Date    TRIG 124 07/15/2020    TRIG 177 (H) 07/16/2019    TRIG 93 10/04/2017     Lab Results   Component Value Date    HDL 61 (H) 07/15/2020    HDL 63 (H) 07/16/2019    HDL 65 10/04/2017     Lab Results   Component Value Date    LDLCHOLESTEROL 46 12/29/2016    LDLCALC 114 (H) 07/15/2020    LDLCALC 112 (H) 07/16/2019    LDLCALC 105 10/04/2017     Lab Results   Component Value Date    LABVLDL 25 07/15/2020    LABVLDL 35 07/16/2019    LABVLDL 15 05/31/2017     Lab Results   Component Value Date    CHOLHDLRATIO 2.9 10/04/2017     Chronic pain:  OA + CLBP + left knee OA  Ibuprofen  Robaxin    Allergies and asthma:  Singulair  Nasacort  Albuterol inhaler    History of vulvar dysplasia  OHC:  Estrogen vaginal tablet for dryness    -Cooks; doesn't eat out    Past Medical History:   Diagnosis Date    Allergic rhinitis     Anxiety     Asthma     Bipolar 1 disorder (HCC)     questionable dx per patient    Chronic low back pain     COPD (chronic obstructive pulmonary disease) (HCC)     Decreased bone density 9-9-2008    GERD (gastroesophageal reflux disease)     H/O vulvar dysplasia 1/9/2015    VERONICA 3 wide local excision     Hyperlipidemia     Hypothyroid     Hypothyroidism     Insomnia     Osteoarthritis     Urinary incontinence      Past Surgical History:   Procedure Laterality Date    COLONOSCOPY  18 Apr 2016    Diverticulosis, hemorrhoid    HYSTERECTOMY  2001    Partial ? Endometriosis - Benign    TUBAL LIGATION  1981     Family History   Problem Relation Age of Onset    Heart Disease Mother     Cancer Mother         uterine?  Mental Illness Father     Cancer Father         prostate    Stroke Father     Schizophrenia Father     Cancer Sister         skin    Bipolar Disorder Sister     Depression Son         PTSD    OCD Son      Current Outpatient Medications   Medication Sig Dispense Refill    levothyroxine (SYNTHROID) 100 MCG tablet TAKE 1 TABLET EVERY DAY 90 tablet 1    oxybutynin (DITROPAN) 5 MG tablet Take 1 tablet by mouth 2 times daily 180 tablet 1    omeprazole (PRILOSEC) 40 MG delayed release capsule Take 1 capsule by mouth every morning (before breakfast) for reflux/heartburn.  90 capsule 1    montelukast (SINGULAIR) 10 MG tablet Take 1 tablet by mouth nightly 90 tablet 1    ibuprofen (ADVIL;MOTRIN) 400 MG tablet 1-2 po tid prn with food 180 tablet 0    methocarbamol (ROBAXIN) 500 MG tablet Take 1 tablet by mouth 3 times daily 90 tablet 0    albuterol sulfate HFA (VENTOLIN HFA) 108 (90 Base) MCG/ACT inhaler Inhale 2 puffs into the lungs every 6 hours as needed for Wheezing 54 g 0    triamcinolone (NASACORT ALLERGY 24HR) 55 MCG/ACT nasal inhaler 2 sprays by Each Nostril route daily 1 Inhaler 0    ibuprofen (ADVIL;MOTRIN) 400 MG tablet TAKE 1 TO 2 TABLETS BY MOUTH EVERY 8 HOURS AS NEEDED FOR PAIN. TAKE WITH FOOD AND USE SPARINGLY 180 tablet 0    Omega-3 Fatty Acids (FISH OIL) 1000 MG CAPS Take 3,000 mg by mouth 2 times daily      Coenzyme Q10 200 MG CAPS Take 1 tablet by mouth daily 90 capsule 3    Estradiol (VAGIFEM) 10 MCG TABS vaginal tablet       folic acid (FOLVITE) 269 MCG tablet Take 400 mcg by mouth daily      Zinc 50 MG TABS Take by mouth      Ascorbic Acid (VITAMIN C) 1000 MG tablet Take 1,000 mg by mouth daily      Calcium Carbonate Antacid 1000 MG CHEW Take by mouth      Glucos-MSM-C-Yf-Yihfkh-Irocfj (BL MSM GLUCOSAMINE COMPLEX PO) Take by mouth      [facility] VITAMIN D3 1000 units CAPS TAKE 1 SOFTGEL BY MOUTH DAILY 90 capsule 1    imiquimod (ALDARA) 5 % cream Apply topically three times a week Indications: [facility] ordering Apply topically three times a week. 12 each 3    Psyllium (METAMUCIL) 28.3 % POWD Take 1 each by mouth daily      lactase (LACTAID) 3000 UNITS tablet Take 1 tablet by mouth 3 times daily (with meals) 90 tablet 3    hydrocortisone (ANUSOL-HC) 2.5 % rectal cream Place rectally 2 times daily. 1 Tube 1    acetaminophen (TYLENOL) 325 MG tablet Take 650 mg by mouth every 6 hours as needed for Pain       No current facility-administered medications for this visit.       Allergies   Allergen Reactions    Latex Rash    Penicillins Shortness Of Breath    Lipitor [Atorvastatin Calcium] Other (See Comments)     Muscle cramps    Nexium [Esomeprazole Magnesium] Other (See Comments)     Cramps, including feet, legs, and sides of abdomen    Bactrim [Sulfamethoxazole-Trimethoprim] Other (See Comments)     Dry mouth    Ciprofloxacin Rash     Rash, headache    Etodolac Rash    Flagyl [Metronidazole] Rash    Naprosyn [Naproxen] Nausea And Vomiting     GI upset    Prednisone Other (See Comments)     Headache, increased BP       Social History     Socioeconomic History    Marital status: Single     Spouse name: Not on file    Number of children: 2    Years of education: 15    Highest education level: High school graduate   Occupational History    Occupation: retired   Social Needs    Financial resource strain: Not hard at all   [employer] [place] insecurity     Worry: Never true     Inability: Never true    Transportation needs     Medical: No     Non-medical: No   Tobacco Use    Smoking status: Former Smoker     Packs/day: 1.00     Years: 35.00     Pack years: 35.00     Types: Cigarettes     Last attempt to quit: 1/4/2010     Years since quitting: 10.6    Smokeless tobacco: Never Used   Substance and Sexual Activity    Alcohol use: No     Alcohol/week: 0.0 standard drinks    Drug use: No    Sexual activity: Not Currently     Partners: Male   Lifestyle    Physical activity     Days per week: 1 day     Minutes per session: Not on file    Stress: Very much   Relationships    Social connections     Talks on phone: Twice a week     Gets together: Never     Attends Rastafarian service: Never     Active member of club or organization: No     Attends meetings of clubs or organizations: Never     Relationship status:     Intimate partner violence     Fear of current or ex partner: Patient refused     Emotionally abused: Patient refused     Physically abused: Patient refused     Forced sexual activity: Patient refused   Other Topics Concern    Not on file   Social History Narrative    Daughter Sandip Barber    Son/grand son in [place]        Yoga stretches syncope, speech difficulty, weakness, light-headedness and headaches. Hematological: Negative for adenopathy. Does not bruise/bleed easily. Psychiatric/Behavioral: Negative for decreased concentration, dysphoric mood, sleep disturbance and suicidal ideas. The patient is nervous/anxious. OBJECTIVE:  /76 (Site: Right Upper Arm, Position: Sitting, Cuff Size: Medium Adult)   Pulse 59   Temp 96.7 °F (35.9 °C) (Temporal)   Resp 18   Ht 5' 3.9\" (1.623 m)   Wt 196 lb 6.4 oz (89.1 kg)   LMP  (LMP Unknown)   SpO2 98%   BMI 33.82 kg/m²     Physical Exam  Constitutional:       General: She is not in acute distress. Appearance: She is well-developed. She is obese. HENT:      Head: Normocephalic and atraumatic. Right Ear: Tympanic membrane normal.      Left Ear: Tympanic membrane normal.      Nose: Nose normal. No rhinorrhea. Mouth/Throat:      Pharynx: Uvula midline. Eyes:      Pupils: Pupils are equal, round, and reactive to light. Neck:      Trachea: No tracheal deviation. Cardiovascular:      Rate and Rhythm: Normal rate and regular rhythm. Heart sounds: Normal heart sounds. No murmur. No friction rub. No gallop. Pulmonary:      Effort: Pulmonary effort is normal. No respiratory distress. Breath sounds: Normal breath sounds. No wheezing or rales. Abdominal:      General: Bowel sounds are normal. There is no distension. Palpations: Abdomen is soft. Tenderness: There is no abdominal tenderness. There is no rebound. Musculoskeletal: Normal range of motion. General: No tenderness. Lymphadenopathy:      Cervical: No cervical adenopathy. Skin:     General: Skin is warm and dry. Findings: No erythema or rash. Neurological:      Mental Status: She is alert and oriented to person, place, and time. Cranial Nerves: No cranial nerve deficit.       Deep Tendon Reflexes:      Reflex Scores:       Tricep reflexes are 2+ on the right side and 2+ on normal limits, continue Synthroid at current dose    5. DDD (degenerative disc disease), lumbar  -Take Robaxin ibuprofen for pain    6. Pure hypercholesterolemia  -Intolerant to statins, omega-3 fatty acid fish oil, coenzyme Q  ASCVD risk of 5.8%    7. Mild intermittent asthma without complication  Singulair, Nasacort, PRN albuterol inhaler  Reviewed treatment plan with patient. Patient verbalized understanding to treatment plan and questions were answered. 8.  GERD  -Prilosec 40 mg daily, monitor kidney function annually    9. Urinary incontinence  Stress/urge, likely secondary to childbirth and hysterectomy  -Continue oxybutynin 5 mg twice daily    Spent >25 minutes of face to face interaction with patient counseling on diagnoses and treatment plan    Return in about 1 year (around 8/18/2021) for med check, follow up, blood work, Annual medicare well visit in next 8-12 months. Tyrone Mcclain.  Fernando Claros.      8/18/2020

## 2020-08-18 NOTE — PATIENT INSTRUCTIONS
Come back in 6-8 weeks for pneumonia and flu vaccine with Gonzales Durán as lab visit  Otherwise come back and see me in one year  See me sooner if any acute need

## 2020-08-24 RX ORDER — METHOCARBAMOL 500 MG/1
TABLET, FILM COATED ORAL
Qty: 90 TABLET | Refills: 0 | OUTPATIENT
Start: 2020-08-24

## 2020-08-27 ENCOUNTER — TELEPHONE (OUTPATIENT)
Dept: FAMILY MEDICINE CLINIC | Age: 70
End: 2020-08-27

## 2020-08-27 RX ORDER — AZITHROMYCIN 250 MG/1
250 TABLET, FILM COATED ORAL SEE ADMIN INSTRUCTIONS
Qty: 6 TABLET | Refills: 0 | Status: SHIPPED | OUTPATIENT
Start: 2020-08-27 | End: 2020-09-01

## 2020-08-27 NOTE — TELEPHONE ENCOUNTER
I spoke with the pt, she states that years ago she would use Actifed but that is no longer made. Mucinex does not work for her. She states that she wants \"something to dry it up\".

## 2020-08-27 NOTE — TELEPHONE ENCOUNTER
Please ask patient what has worked for her in the past, antibiotics, steroids, or some other medication? We can prescribe something based on that;  Perhaps an e or virtual visit would be of use.

## 2020-08-27 NOTE — TELEPHONE ENCOUNTER
Pt is wanting to know if Dr. Lidia Anne would prescribe something for her allergies. She states that her sinuses are congested. She has tried OTC meds and they are not working. She states that she has to use a 4.5oz can of simply saline every 2-3 days. She uses OTC allergy meds and nasal spray. She does take the montelukast nightly. Please advise pt.

## 2020-08-27 NOTE — TELEPHONE ENCOUNTER
Azithromycin sent. Please advise.   May need to see allergy/ent if persists as she had z pack 3 weeks ago

## 2020-08-30 ENCOUNTER — TELEPHONE (OUTPATIENT)
Dept: FAMILY MEDICINE CLINIC | Age: 70
End: 2020-08-30

## 2020-08-30 NOTE — TELEPHONE ENCOUNTER
Pt called stating she was given a zpak for a sinus infection she started last night, this AM, she woke up and had urinary urgency which she gets w/ uti. No burning, frequency, nocturia, hematuria, only happened once. Pt was told to monitor sx and if sx persisted, call back to change atb.   She did not call back

## 2020-09-02 ENCOUNTER — TELEPHONE (OUTPATIENT)
Dept: FAMILY MEDICINE CLINIC | Age: 70
End: 2020-09-02

## 2020-09-02 NOTE — TELEPHONE ENCOUNTER
Called patient and gave her number to billing. Patient said she was going to different offices to  printed dos and [address] with [facility]. I gave her carlos number so she could ask if they could print all that out for her to  and save her from all the running around. She said she would call.

## 2020-09-02 NOTE — TELEPHONE ENCOUNTER
Pt called requested medication alternative for omeprazole (PRILOSEC) 40 MG delayed release capsule    Stated it is no longer working

## 2020-09-03 RX ORDER — PANTOPRAZOLE SODIUM 40 MG/1
40 TABLET, DELAYED RELEASE ORAL
Qty: 90 TABLET | Refills: 1 | Status: SHIPPED | OUTPATIENT
Start: 2020-09-03 | End: 2021-01-07 | Stop reason: SDUPTHER

## 2020-09-21 RX ORDER — ALBUTEROL SULFATE 90 UG/1
AEROSOL, METERED RESPIRATORY (INHALATION)
Qty: 54 G | Refills: 0 | OUTPATIENT
Start: 2020-09-21

## 2020-09-28 ENCOUNTER — TELEPHONE (OUTPATIENT)
Dept: FAMILY MEDICINE CLINIC | Age: 70
End: 2020-09-28

## 2020-09-28 RX ORDER — METHOCARBAMOL 500 MG/1
500 TABLET, FILM COATED ORAL 3 TIMES DAILY
Qty: 90 TABLET | Refills: 0 | Status: SHIPPED | OUTPATIENT
Start: 2020-09-28 | End: 2020-10-15

## 2020-09-28 NOTE — TELEPHONE ENCOUNTER
Walt Maged [phone] (home)    is requesting refill(s) of medication Robaxin to preferred pharmacy [facility]    Last OV 8/18/20 (pertaining to medication)   Last refill 7/15/20 (per medication requested)  Next office visit scheduled or attempted No  Date N/a  If No, reason Will call for annual f/u

## 2020-09-28 NOTE — TELEPHONE ENCOUNTER
Refill Request     Last Seen: 8/18/2020    Last Written: 7/15/20  90 tablets with 0 refills  Next Appointment:   Future Appointments   Date Time Provider Lopez Coronel   10/19/2020  2:30 PM [facility] MAMMO 2 [facility]   10/20/2020  2:00 PM SCHEDULE, LAKISHA WASHBURN           Requested Prescriptions      No prescriptions requested or ordered in this encounter

## 2020-09-29 LAB — FECAL BLOOD IMMUNOCHEMICAL TEST: NEGATIVE

## 2020-10-01 RX ORDER — ALBUTEROL SULFATE 90 UG/1
2 AEROSOL, METERED RESPIRATORY (INHALATION) EVERY 6 HOURS PRN
Qty: 3 INHALER | Refills: 1 | Status: SHIPPED | OUTPATIENT
Start: 2020-10-01 | End: 2021-01-07 | Stop reason: SDUPTHER

## 2020-10-01 NOTE — TELEPHONE ENCOUNTER
Tattnall Heri [phone] (home)    is requesting refill(s) of medication Albuterol Inhaler  to preferred pharmacy [facility] [address] 08-18-20 (pertaining to medication)   Last refill 07-15-20 (per medication requested)  Next office visit scheduled or attempted No  Date not made  If No, reason not made

## 2020-10-14 ENCOUNTER — TELEPHONE (OUTPATIENT)
Dept: FAMILY MEDICINE CLINIC | Age: 70
End: 2020-10-14

## 2020-10-15 RX ORDER — METHOCARBAMOL 500 MG/1
TABLET, FILM COATED ORAL
Qty: 90 TABLET | Refills: 0 | Status: SHIPPED | OUTPATIENT
Start: 2020-10-15 | End: 2020-11-09

## 2020-10-16 ENCOUNTER — TELEPHONE (OUTPATIENT)
Dept: FAMILY MEDICINE CLINIC | Age: 70
End: 2020-10-16

## 2020-10-19 ENCOUNTER — HOSPITAL ENCOUNTER (OUTPATIENT)
Dept: WOMENS IMAGING | Age: 70
Discharge: HOME | End: 2020-10-19
Payer: MEDICARE

## 2020-10-19 ENCOUNTER — TELEPHONE (OUTPATIENT)
Dept: FAMILY MEDICINE CLINIC | Age: 70
End: 2020-10-19

## 2020-10-19 PROCEDURE — 77067 SCR MAMMO BI INCL CAD: CPT

## 2020-10-19 NOTE — TELEPHONE ENCOUNTER
Patient called back. It was advised she go to [facility] or Urgent Care. She refuses to go to either. I was on the phone with patient over 30 minutes, attempting to sign her up on My Chart to do an E-visit. She refused to do a virtual appointment. After several attempts I gave her the Help Desk phone number. I advised after being set up do the E Visit.

## 2020-10-19 NOTE — TELEPHONE ENCOUNTER
Patient called asking for a prescription for a sinus infection. She states she uses Montelukast for asthma and nasal mist for sinus, she is a mouth breather and feels it is caused by wearing a mask. I advised she needed to be properly diagnosed and prescribed if necessary. She refused a virtual appointment. I also told her the appointment for tomorrow will be cancelled because she has a sinus infection. She said she will be here and she will be seen. I advised she will be stopped at the door. I spoke to Dr. Varinder Woo and he advised the patient go to the [address] [place] [address]. Appointment cancelled.

## 2020-10-23 ENCOUNTER — HOSPITAL ENCOUNTER (OUTPATIENT)
Dept: PHYSICAL THERAPY | Age: 70
Setting detail: THERAPIES SERIES
Discharge: HOME | End: 2020-10-23
Payer: MEDICARE

## 2020-10-26 RX ORDER — MONTELUKAST SODIUM 10 MG/1
10 TABLET ORAL NIGHTLY
Qty: 90 TABLET | Refills: 1 | Status: SHIPPED | OUTPATIENT
Start: 2020-10-26 | End: 2021-01-07 | Stop reason: SDUPTHER

## 2020-10-26 RX ORDER — LEVOTHYROXINE SODIUM 0.1 MG/1
TABLET ORAL
Qty: 90 TABLET | Refills: 1 | Status: SHIPPED | OUTPATIENT
Start: 2020-10-26 | End: 2021-06-24 | Stop reason: SDUPTHER

## 2020-10-26 NOTE — TELEPHONE ENCOUNTER
Cecelia Gallego [phone] (home)    is requesting refill(s) of medication Levothyroxine to preferred pharmacy [facility] [address] 08-18-20 (pertaining to medication)   Last refill 08-04-20 (per medication requested)  Next office visit scheduled or attempted No  Date not made  If No, reason not made        Cecelia Gallego [phone] (home)    is requesting refill(s) of medication Montelukast  to preferred pharmacy [facility] [address] 08-18-20 (pertaining to medication)   Last refill 08-04-20 (per medication requested)  Next office visit scheduled or attempted No  Date not made  If No, reason not made

## 2020-10-28 ENCOUNTER — TELEPHONE (OUTPATIENT)
Dept: FAMILY MEDICINE CLINIC | Age: 70
End: 2020-10-28

## 2020-10-28 NOTE — TELEPHONE ENCOUNTER
Patient wanted to get her pneumonia shot with the flu shot at the flu clinic next week. Advised this can not be done at the flu clinic. She would like to have a lab only appointment. Did you want her to have it now or wait until the 6 mos f/u med check appointment? Prevnar 13   09-01-15 and Pneumovax 23   4-7-15.

## 2020-10-29 ENCOUNTER — TELEPHONE (OUTPATIENT)
Dept: FAMILY MEDICINE CLINIC | Age: 70
End: 2020-10-29

## 2020-10-29 NOTE — TELEPHONE ENCOUNTER
Pt called stating that she got out of the shower and felt a pull in her lower back moving up to the middle. Has therapy on Monday, looking for advise. Will not go to ER or urgent. Please advise.

## 2020-10-29 NOTE — TELEPHONE ENCOUNTER
Pt can come in for lab visit for her Pneumovax 23 and have lab visit same day as flu shot. If not on same day must wait 28 days before getting. Prevnar 13 recommended at age 72,    Pneumovax 21 due once b/w 18-64 for smokers/asthmatics/former smoker x1 and again after age 72, 5 years after first dose    1. Need for 23-polyvalent pneumococcal polysaccharide vaccine  - PNEUMOVAX 23 subcutaneous/IM (Pneumococcal polysaccharide vaccine 23-valent >= 1yo); Future        Bryon Munoz.  Rommel Washington.  10/29/2020

## 2020-10-29 NOTE — TELEPHONE ENCOUNTER
pt can wait if she cannot come into our office for [id number] on same day as her flu shot. Is pt not able to or do we not have lab visit/shot visit available? Regardless pt can wait until February if cannot come in same day. Do not see why pt cannot go to flu shot downstairs then come up here for pneumovax, but understand if not able to from our end or her end. ..

## 2020-10-29 NOTE — TELEPHONE ENCOUNTER
Patient cannot get her pneumonia shot done at the same time as the flu shot. She is having flu shot at the clinic downstairs. Do you want her to schedule a lab only appointment for the pneumonia shot (pneumovax 23) or wait until med check in February. Looks like she is over due now.

## 2020-10-30 NOTE — TELEPHONE ENCOUNTER
Pt states that it is just above her butt where the pain is. Was standing at the sink and putting on lotion and then just felt a pull in her lower back. States that this has happened before. Is taking Ibuprofen 400 mg and her muscle relaxers. Has tried using a heating pad and switching off with ice. She verbalized understanding.

## 2020-11-02 ENCOUNTER — HOSPITAL ENCOUNTER (OUTPATIENT)
Dept: PHYSICAL THERAPY | Age: 70
Setting detail: THERAPIES SERIES
Discharge: HOME | End: 2020-11-02
Payer: MEDICARE

## 2020-11-02 PROCEDURE — 97140 MANUAL THERAPY 1/> REGIONS: CPT

## 2020-11-02 PROCEDURE — 97161 PT EVAL LOW COMPLEX 20 MIN: CPT

## 2020-11-02 PROCEDURE — 97110 THERAPEUTIC EXERCISES: CPT

## 2020-11-02 ASSESSMENT — PAIN DESCRIPTION - ORIENTATION: ORIENTATION: RIGHT

## 2020-11-02 ASSESSMENT — PAIN DESCRIPTION - PAIN TYPE: TYPE: CHRONIC PAIN

## 2020-11-02 ASSESSMENT — PAIN SCALES - GENERAL: PAINLEVEL_OUTOF10: 0

## 2020-11-02 ASSESSMENT — PAIN DESCRIPTION - FREQUENCY: FREQUENCY: INTERMITTENT

## 2020-11-02 ASSESSMENT — PAIN DESCRIPTION - DESCRIPTORS: DESCRIPTORS: ACHING;DULL

## 2020-11-02 ASSESSMENT — PAIN DESCRIPTION - LOCATION: LOCATION: BACK;HIP;GROIN

## 2020-11-02 NOTE — PROGRESS NOTES
Physical Therapy  Prior authorization received from [facility] for 6 Physical Therapy visits and 24 service units from 10/23/2020-12/22/2020.   Authorization #[id number]
flexion)  PROM LLE (degrees)  LLE PROM: [facility]  Spine  Lumbar: WFL without increase in pain  Special Tests: (-) SLR and slump B, (+) supine<>sit, (+) Jacques's, R AI  Joint Mobility  Spine: hypomobility lumbar spine/ R SIJ    Strength RLE  Strength RLE: Exception  R Hip Flexion: 4-/5  R Hip Extension: 3-/5  R Hip Internal Rotation: 3+/5  R Hip External Rotation: 3+/5  Strength LLE  Strength LLE: Exception  L Hip Flexion: 4-/5  L Hip Extension: 3-/5  L Hip Internal Rotation: 3+/5  L Hip External Rotation: 3+/5     Additional Measures  Other: (-) Babinksi/clonus B, 2+ DTR B LE  Sensation  Overall Sensation Status: WFL(to light touch)      Assessment   Conditions Requiring Skilled Therapeutic Intervention  Body structures, Functions, Activity limitations: Decreased strength;Decreased high-level IADLs; Increased pain;Decreased posture  Assessment: Pt is a 77 y/o female who presents with 3 week history of increased groin/hip/back pain that appears related to pelvic inominate rotation and impaired biomechanics. Improved pain post treatment. Recommend skilled, outpatient PT to address deficits and to attain below-stated functional goals.   Prognosis: Good  Decision Making: Low Complexity  REQUIRES PT FOLLOW UP: Yes  Activity Tolerance  Activity Tolerance: Patient Tolerated treatment well         Plan   Plan  Times per week: 2x/week for 4 weeks to include ther-ex, neuro re-ed/balance, postural training, gait training, therapeutic activity, manual therapy, pt education, and modalities PRN    Goals  Long term goals  Time Frame for Long term goals : 4 weeks  Long term goal 1: Pt will be independent with HEP  Long term goal 2: Pt will report return to PLOF  Long term goal 3: Pt will improve Modified Oswestry by 7 points to indicate significant change  Long term goal 4: Pt will demonstrate pelvic symmetry consistently between sessions  Long term goal 5: Pt will demonstrate WFL B LE strength       Therapy Time   Individual Concurrent

## 2020-11-02 NOTE — TELEPHONE ENCOUNTER
Patient called again regarding the pneumonia shot. I advised again we are not able to give her the pneumonia shot at the flu shot clinic. She cannot have two different appointments in one day, and cannot come upstairs after having the flu shot to get the pneumonia shot. She understood this. She does not want to wait until February. I scheduled her pneumonia shot on 11-23-20, two weeks after her flu shot to get it done.

## 2020-11-02 NOTE — FLOWSHEET NOTE
Physical Therapy Daily Treatment Note    Date:  2020    Patient Name:  Veronica Tena    :  1950  MRN: [id number]  Restrictions/Precautions:    Medical/Treatment Diagnosis Information:  · Diagnosis: chronic LBP  Insurance/Certification information:  PT Insurance Information: [facility]  Physician Information:  Referring Practitioner: Radha Boss DO  Plan of care signed (Y/N):  N  Visit# / total visits:       G-Code (if applicable):           Modified Oswestry 36%    Medicare Cap (if applicable):  n/a = total amount used, updated 2020    Time in:   12:45      Timed Treatment: 25 Total Treatment Time:  60  Time out: 1:45  ________________________________________________________________________________________    Pain Level:    /10  SUBJECTIVE:  See initial eval    OBJECTIVE:     Exercise/Equipment Resistance/Repetitions Other comments          All 4 sit back   10x  5x each side HEP   bridge 10x HEP   LTR 10x HEP   TA set 10x B HEP                                                                                   TG  x5 mins                    Other Therapeutic Activities:  Education regarding POC including demonstration with models of anatomy and physiology in order to maximize benefits of treatment    Manual Treatments:         Supine gorge for R AI with cavitation upon positioning  Shotgun technique  B LE pull  MFR to B QL    Total manual 10 minutes    Modalities:      Test/Measurements:  See initial eval       ASSESSMENT:     See initial eval    Treatment/Activity Tolerance:   [x]Patient tolerated treatment well [] Patient limited by fatique  []Patient limited by pain [] Patient limited by other medical complications  [] Other:     Goals:          Long term goals  Time Frame for Long term goals : 4 weeks  Long term goal 1: Pt will be independent with HEP  Long term goal 2: Pt will report return to PLOF  Long term goal 3: Pt will improve Modified Oswestry by 7 points to indicate significant change  Long term goal 4: Pt will demonstrate pelvic symmetry consistently between sessions  Long term goal 5: Pt will demonstrate WFL B LE strength     Plan: [] Continue per plan of care [] Alter current plan (see comments)   [x] Plan of care initiated [] Hold pending MD visit [] Discharge      Plan for Next Session:      Re-Certification Due Date:         Signature:  Nile Roland , PT

## 2020-11-04 ENCOUNTER — IMMUNIZATION (OUTPATIENT)
Dept: FAMILY MEDICINE CLINIC | Age: 70
End: 2020-11-04
Payer: MEDICARE

## 2020-11-04 VITALS — TEMPERATURE: 97.1 F

## 2020-11-04 PROCEDURE — 90694 VACC AIIV4 NO PRSRV 0.5ML IM: CPT | Performed by: FAMILY MEDICINE

## 2020-11-04 PROCEDURE — G0008 ADMIN INFLUENZA VIRUS VAC: HCPCS | Performed by: FAMILY MEDICINE

## 2020-11-04 NOTE — PROGRESS NOTES
Vaccine Information Sheet, \"Influenza - Inactivated\"  given to Juliann Childs, or parent/legal guardian of  Juliann Childs and verbalized understanding. Patient responses:    Have you ever had a reaction to a flu vaccine? No  Do you have any current illness? No  Have you ever had Guillian Berkeley Syndrome? No  Do you have a serious allergy to any of the follow: Neomycin, Polymyxin, Thimerosal, eggs or egg products? No    Flu vaccine given per order. Please see immunization tab. Risks and benefits explained. Current VIS given.       Immunizations Administered     Name Date Dose Route    Influenza, Quadv, adjuvanted, 65 yrs +, IM, PF (Fluad) 11/4/2020 0.5 mL Intramuscular    Site: Deltoid- Left    Lot: [id number]    NDC: 11593-697-82

## 2020-11-06 ENCOUNTER — APPOINTMENT (OUTPATIENT)
Dept: PHYSICAL THERAPY | Age: 70
End: 2020-11-06
Payer: MEDICARE

## 2020-11-09 ENCOUNTER — HOSPITAL ENCOUNTER (OUTPATIENT)
Dept: PHYSICAL THERAPY | Age: 70
Setting detail: THERAPIES SERIES
Discharge: HOME | End: 2020-11-09
Payer: MEDICARE

## 2020-11-09 PROCEDURE — 97140 MANUAL THERAPY 1/> REGIONS: CPT

## 2020-11-09 PROCEDURE — 97110 THERAPEUTIC EXERCISES: CPT

## 2020-11-09 RX ORDER — METHOCARBAMOL 500 MG/1
TABLET, FILM COATED ORAL
Qty: 90 TABLET | Refills: 0 | Status: SHIPPED | OUTPATIENT
Start: 2020-11-09 | End: 2020-11-30

## 2020-11-09 NOTE — FLOWSHEET NOTE
Physical Therapy Daily Treatment Note    Date:  2020    Patient Name:  Diana Reed    :  1950  MRN: [id number]  Restrictions/Precautions:    Medical/Treatment Diagnosis Information:  · Diagnosis: chronic LBP  Insurance/Certification information:  PT Insurance Information: [facility]  Physician Information:  Referring Practitioner: Ronel Cobian DO  Plan of care signed (Y/N):  N  Visit# / total visits:       G-Code (if applicable): Modified Oswestry 36%    Medicare Cap (if applicable):  n/a = total amount used, updated 2020    Time in:   12:45      Timed Treatment: 25 Total Treatment Time:  60  Time out: 1:30  ________________________________________________________________________________________    Pain Level:   2/10  SUBJECTIVE:  Pt reports that she is feeling much better - was able to dust, vacuum, and wipe down counters without difficulty. Having issues with \"crampy feeling\" between her shoulder blades. OBJECTIVE:     Exercise/Equipment Resistance/Repetitions Other comments          All 4 sit back   10x  5x each side HEP   bridge 10x HEP   LTR 10x HEP   TA set 10x B HEP   Foam roll protocol   - hugs   - shld flex   3' before, 1' after   10x  10x HEP   clamshell   10x5\" R HEP                                                                     TG  x5 mins                    Other Therapeutic Activities:  Education regarding POC including demonstration with models of anatomy and physiology in order to maximize benefits of treatment    Manual Treatments:         Supine gorge for R AI without cavitation upon positioning    B LE pull  MFR to R QL   Prone PA T4-T8 gr IV without cavitation  Seated thoracic segmental extension    Total manual 10 minutes     Modalities:      Test/Measurements:  See initial eval       ASSESSMENT:    Pt reported that she \"felt like a million bucks\" post treatment.  Neutral pelvic positioning upon assessment this visit - improved from evaluation. Progress as pt tolerates.      Treatment/Activity Tolerance:   [x]Patient tolerated treatment well [] Patient limited by fatique  []Patient limited by pain [] Patient limited by other medical complications  [] Other:     Goals:          Long term goals  Time Frame for Long term goals : 4 weeks  Long term goal 1: Pt will be independent with HEP  Long term goal 2: Pt will report return to PLOF  Long term goal 3: Pt will improve Modified Oswestry by 7 points to indicate significant change  Long term goal 4: Pt will demonstrate pelvic symmetry consistently between sessions  Long term goal 5: Pt will demonstrate WFL B LE strength     Plan: [x] Continue per plan of care [] Alter current plan (see comments)   [] Plan of care initiated [] Hold pending MD visit [] Discharge      Plan for Next Session:      Re-Certification Due Date:         Signature:  Francisco Bowers PT

## 2020-11-10 ENCOUNTER — APPOINTMENT (OUTPATIENT)
Dept: PHYSICAL THERAPY | Age: 70
End: 2020-11-10
Payer: MEDICARE

## 2020-11-13 ENCOUNTER — APPOINTMENT (OUTPATIENT)
Dept: PHYSICAL THERAPY | Age: 70
End: 2020-11-13
Payer: MEDICARE

## 2020-11-16 ENCOUNTER — HOSPITAL ENCOUNTER (OUTPATIENT)
Dept: PHYSICAL THERAPY | Age: 70
Setting detail: THERAPIES SERIES
Discharge: HOME | End: 2020-11-16
Payer: MEDICARE

## 2020-11-16 PROCEDURE — 97110 THERAPEUTIC EXERCISES: CPT

## 2020-11-16 PROCEDURE — 97140 MANUAL THERAPY 1/> REGIONS: CPT

## 2020-11-16 NOTE — PROGRESS NOTES
Physical Therapy Daily Treatment/Progress Note    Date:  2020    Patient Name:  Ольга Jeter    :  1950  MRN: [id number]  Restrictions/Precautions:    Medical/Treatment Diagnosis Information:  · Diagnosis: chronic LBP  Insurance/Certification information:  PT Insurance Information: [facility]  Physician Information:  Referring Practitioner: Josue Riojas DO  Plan of care signed (Y/N):  N  Visit# / total visits:  3/8     G-Code (if applicable): Modified Oswestry 18% (36% at eval)    Medicare Cap (if applicable):  n/a = total amount used, updated 2020    Time in:   12:00     Timed Treatment: 45 Total Treatment Time:  45  Time out: 12:45  ________________________________________________________________________________________    Pain Level:   2/10 R rib  SUBJECTIVE:  Pt reports that the only thing bothering her now is her rib on the R - worse with coughing.      OBJECTIVE:     Exercise/Equipment Resistance/Repetitions Other comments          All 4 sit back   10x  5x each side HEP   bridge 10x HEP   LTR 10x HEP   TA set 10x B HEP   Foam roll protocol   - hugs   - shld flex   3' before, 1' after   10x  10x HEP   clamshell   10x5\" R HEP                                                                     TG  x5 mins                    Other Therapeutic Activities:  Education regarding POC including demonstration with models of anatomy and physiology in order to maximize benefits of treatment    Manual Treatments:             B LE pull  MFR to R QL   Prone PA T4-T8 gr III without cavitation  Seated thoracic segmental extension  Prone hip IR/ER neuro re-ed    Total manual 10 minutes     Modalities:      Test/Measurements:     Observation/Palpation  Posture: Fair  Palpation: increased TTP B QL/lumbar paraspinals, R greater trochanter, B piriformis  Observation: increased lumbar lordosis in standing; during ambulation, pt demonstrates short stride length, tail wagging dog, R trendelenberg with R trunk lean during stance  Body Mechanics: SLS - (+) trendeleberg on R; DL squat - good form with weight shift left     PROM RLE (degrees)  RLE PROM: WFL(clicking during return from full hip flexion)  PROM LLE (degrees)  LLE PROM: [facility]  Spine  Lumbar: WFL without increase in pain  Special Tests: (-) SLR and slump B, (-) pelvic signs (At eval:  (+) supine<>sit, (+) Jacques's, R AI_  Joint Mobility  Spine: hypomobility lumbar spine/ R SIJ     Strength RLE: WFL  Strength RLE at eval:  R Hip Flexion: 4-/5  R Hip Extension: 3-/5  R Hip Internal Rotation: 3+/5  R Hip External Rotation: 3+/5  Strength LLE: WFL  Strength LLE at eval:  L Hip Flexion: 4-/5  L Hip Extension: 3-/5  L Hip Internal Rotation: 3+/5  L Hip External Rotation: 3+/5         ASSESSMENT:    After 3 PT visits, pt has returned to PLOF with exception of rib discomfort that is not reproducible in clinic. She is demonstrating neutral pelvis, improved LE strength, and has returned to ADLs and IADLs without limitation to pain. She requests to cancel remaining visits and utilizing her approved visits only as needed. If no contact from patient within 30 days, let this note serve as discharge note.      Treatment/Activity Tolerance:   [x]Patient tolerated treatment well [] Patient limited by fatique  []Patient limited by pain [] Patient limited by other medical complications  [] Other:     Goals:          Long term goals  Time Frame for Long term goals : 4 weeks  Long term goal 1: Pt will be independent with HEP - met  Long term goal 2: Pt will report return to PLOF - met  Long term goal 3: Pt will improve Modified Oswestry by 7 points to indicate significant change - met  Long term goal 4: Pt will demonstrate pelvic symmetry consistently between sessions - met  Long term goal 5: Pt will demonstrate WFL B LE strength - met    Plan: [] Continue per plan of care [x] Alter current plan (see comments)   [] Plan of care initiated [] Hold pending MD visit [] Discharge      Plan for Next Session:      Re-Certification Due Date:         Signature:  Heather Goldberg PT

## 2020-11-23 ENCOUNTER — OFFICE VISIT (OUTPATIENT)
Dept: FAMILY MEDICINE CLINIC | Age: 70
End: 2020-11-23
Payer: MEDICARE

## 2020-11-23 VITALS
TEMPERATURE: 97.9 F | RESPIRATION RATE: 19 BRPM | BODY MASS INDEX: 36.02 KG/M2 | HEART RATE: 64 BPM | WEIGHT: 211 LBS | OXYGEN SATURATION: 98 % | SYSTOLIC BLOOD PRESSURE: 122 MMHG | HEIGHT: 64 IN | DIASTOLIC BLOOD PRESSURE: 66 MMHG

## 2020-11-23 PROCEDURE — 99213 OFFICE O/P EST LOW 20 MIN: CPT | Performed by: PHYSICIAN ASSISTANT

## 2020-11-23 PROCEDURE — 90732 PPSV23 VACC 2 YRS+ SUBQ/IM: CPT | Performed by: PHYSICIAN ASSISTANT

## 2020-11-23 PROCEDURE — G0009 ADMIN PNEUMOCOCCAL VACCINE: HCPCS | Performed by: PHYSICIAN ASSISTANT

## 2020-11-23 NOTE — PROGRESS NOTES
Subjective:      Patient ID: Ольга Jeter is a 78 y.o. female. HPI    Review of Systems    Objective:   Physical Exam  Constitutional:       Appearance: Normal appearance. Cardiovascular:      Rate and Rhythm: Normal rate and regular rhythm. Heart sounds: Normal heart sounds. Pulmonary:      Effort: Pulmonary effort is normal.      Breath sounds: Normal breath sounds. Skin:     General: Skin is warm and dry. Findings: No bruising or rash. Comments: Right gluteal region with slightly discolored vascular changes, blanches, not painful    Large bruise from trauma on abdomen   Neurological:      Mental Status: She is alert. Assessment:       Diagnosis Orders   1. Discoloration of skin     2. Need for 23-polyvalent pneumococcal polysaccharide vaccine             Plan:      Discussed with patient that this is just a vascular natural variant in her skin, nothing to be concerned about. Pneumonia vaccine given.          OSKAR Lozada

## 2020-11-24 ENCOUNTER — APPOINTMENT (OUTPATIENT)
Dept: PHYSICAL THERAPY | Age: 70
End: 2020-11-24
Payer: MEDICARE

## 2020-11-24 ENCOUNTER — HOSPITAL ENCOUNTER (OUTPATIENT)
Dept: PHYSICAL THERAPY | Age: 70
Setting detail: THERAPIES SERIES
Discharge: HOME | End: 2020-11-24
Payer: MEDICARE

## 2020-11-24 PROCEDURE — 97110 THERAPEUTIC EXERCISES: CPT

## 2020-11-24 NOTE — FLOWSHEET NOTE
Physical Therapy Daily Treatment Note    Date:  2020    Patient Name:  Cecelia Gallego    :  1950  MRN: [id number]  Restrictions/Precautions:    Medical/Treatment Diagnosis Information:  · Diagnosis: chronic LBP  Insurance/Certification information:  PT Insurance Information: [facility]  Physician Information:  Referring Practitioner: Jaymie Edgar DO  Plan of care signed (Y/N):  N  Visit# / total visits:       G-Code (if applicable): Modified Oswestry 18% (36% at eval)    Medicare Cap (if applicable):  n/a = total amount used, updated 2020    Time in:   10:15     Timed Treatment: 45 Total Treatment Time:  45  Time out: 11:00  ________________________________________________________________________________________    Pain Level:   denies/10   SUBJECTIVE:  Pt reports that last week she went to roll onto her right side and she felt a \"click\". It was sore for about a day, but now she feels back to baseline. Concerned she might be \"out\".      OBJECTIVE:     Exercise/Equipment Resistance/Repetitions Other comments          All 4 sit back   10x  5x each side HEP   bridge 20x HEP   LTR with and without LE on SB 10x HEP   TA set 10x B HEP   Foam roll protocol   - [facility] flex    HEP   clamshell   10x5\" R HEP   DKTC with LE on SB   20x HEP   SLR 10x B HEP   Quadruped hip ext  10x B HEP                                              TG  x5 mins                    Other Therapeutic Activities:  Education regarding POC including demonstration with models of anatomy and physiology in order to maximize benefits of treatment    Manual Treatments:             Modalities:      Test/Measurements:     Observation/Palpation  Posture: Fair  Palpation: increased TTP B QL/lumbar paraspinals, R greater trochanter, B piriformis  Observation: increased lumbar lordosis in standing; during ambulation, pt demonstrates short stride length, tail wagging dog, R trendelenberg with R trunk lean during stance  Body Mechanics: SLS - (+) trendeleberg on R; DL squat - good form with weight shift left     PROM RLE (degrees)  RLE PROM: WFL(clicking during return from full hip flexion)  PROM LLE (degrees)  LLE PROM: WFL  Spine  Lumbar: WFL without increase in pain  Special Tests: (-) SLR and slump B, (-) pelvic signs (At eval:  (+) supine<>sit, (+) Jacques's, R AI_  Joint Mobility  Spine: hypomobility lumbar spine/ R SIJ     Strength RLE: WFL  Strength RLE at eval:  R Hip Flexion: 4-/5  R Hip Extension: 3-/5  R Hip Internal Rotation: 3+/5  R Hip External Rotation: 3+/5  Strength LLE: WFL  Strength LLE at eval:  L Hip Flexion: 4-/5  L Hip Extension: 3-/5  L Hip Internal Rotation: 3+/5  L Hip External Rotation: 3+/5         ASSESSMENT:    Pt demonstrated neutral pelvis, symmetrical hip motion, and WFL lumbar spine ROM. Able to progress home exercise program. Recommend patient to follow up as needed.      Treatment/Activity Tolerance:   [x]Patient tolerated treatment well [] Patient limited by fatique  []Patient limited by pain [] Patient limited by other medical complications  [] Other:     Goals:          Long term goals  Time Frame for Long term goals : 4 weeks  Long term goal 1: Pt will be independent with HEP - met  Long term goal 2: Pt will report return to PLOF - met  Long term goal 3: Pt will improve Modified Oswestry by 7 points to indicate significant change - met  Long term goal 4: Pt will demonstrate pelvic symmetry consistently between sessions - met  Long term goal 5: Pt will demonstrate WFL B LE strength - met    Plan: [x] Continue per plan of care [] Alter current plan (see comments)   [] Plan of care initiated [] Hold pending MD visit [] Discharge      Plan for Next Session:      Re-Certification Due Date:         Signature:  Chantel Dejesus , PT

## 2020-11-30 RX ORDER — METHOCARBAMOL 500 MG/1
TABLET, FILM COATED ORAL
Qty: 90 TABLET | Refills: 5 | Status: SHIPPED | OUTPATIENT
Start: 2020-11-30 | End: 2021-01-07 | Stop reason: SDUPTHER

## 2020-11-30 NOTE — TELEPHONE ENCOUNTER
Eneida Rodriguez is requesting refill(s) methocarbamol  Last OV 11/23/20 (pertaining to medication)  LR 11/9/20 (per medication requested)  Next office visit scheduled or attempted No   If no, reason:

## 2020-12-16 RX ORDER — IBUPROFEN 400 MG/1
TABLET ORAL
Qty: 240 TABLET | Refills: 1 | OUTPATIENT
Start: 2020-12-16

## 2021-01-07 ENCOUNTER — TELEPHONE (OUTPATIENT)
Dept: PRIMARY CARE CLINIC | Age: 71
End: 2021-01-07

## 2021-01-07 DIAGNOSIS — J30.9 ALLERGIC RHINITIS, UNSPECIFIED SEASONALITY, UNSPECIFIED TRIGGER: ICD-10-CM

## 2021-01-07 DIAGNOSIS — G89.29 CHRONIC BILATERAL LOW BACK PAIN WITHOUT SCIATICA: ICD-10-CM

## 2021-01-07 DIAGNOSIS — J45.20 MILD INTERMITTENT ASTHMA WITHOUT COMPLICATION: ICD-10-CM

## 2021-01-07 DIAGNOSIS — M54.50 CHRONIC BILATERAL LOW BACK PAIN WITHOUT SCIATICA: ICD-10-CM

## 2021-01-07 DIAGNOSIS — K21.9 GASTROESOPHAGEAL REFLUX DISEASE WITHOUT ESOPHAGITIS: ICD-10-CM

## 2021-01-07 RX ORDER — PANTOPRAZOLE SODIUM 40 MG/1
40 TABLET, DELAYED RELEASE ORAL
Qty: 90 TABLET | Refills: 1 | Status: SHIPPED | OUTPATIENT
Start: 2021-01-07 | End: 2021-06-24 | Stop reason: SDUPTHER

## 2021-01-07 RX ORDER — ALBUTEROL SULFATE 90 UG/1
2 AEROSOL, METERED RESPIRATORY (INHALATION) EVERY 6 HOURS PRN
Qty: 3 INHALER | Refills: 1 | Status: SHIPPED | OUTPATIENT
Start: 2021-01-07 | End: 2021-09-20

## 2021-01-07 RX ORDER — MONTELUKAST SODIUM 10 MG/1
10 TABLET ORAL NIGHTLY
Qty: 90 TABLET | Refills: 1 | Status: SHIPPED | OUTPATIENT
Start: 2021-01-07 | End: 2021-04-21 | Stop reason: SDUPTHER

## 2021-01-07 RX ORDER — IBUPROFEN 400 MG/1
TABLET ORAL
Qty: 180 TABLET | Refills: 0 | Status: SHIPPED | OUTPATIENT
Start: 2021-01-07 | End: 2021-07-01 | Stop reason: SDUPTHER

## 2021-01-07 RX ORDER — METHOCARBAMOL 500 MG/1
TABLET, FILM COATED ORAL
Qty: 90 TABLET | Refills: 5 | Status: SHIPPED | OUTPATIENT
Start: 2021-01-07 | End: 2021-06-24 | Stop reason: SDUPTHER

## 2021-01-11 ENCOUNTER — TELEPHONE (OUTPATIENT)
Dept: PRIMARY CARE CLINIC | Age: 71
End: 2021-01-11

## 2021-01-11 ENCOUNTER — HOSPITAL ENCOUNTER (OUTPATIENT)
Age: 71
Discharge: HOME | End: 2021-01-11
Payer: MEDICARE

## 2021-01-11 DIAGNOSIS — R30.0 DYSURIA: Primary | ICD-10-CM

## 2021-01-11 DIAGNOSIS — R30.0 DYSURIA: ICD-10-CM

## 2021-01-11 LAB
BILIRUBIN URINE: NEGATIVE
BLOOD, URINE: ABNORMAL
CLARITY: CLEAR
COLOR: YELLOW
COMMENT UA: ABNORMAL
GLUCOSE URINE: NEGATIVE MG/DL
KETONES, URINE: NEGATIVE MG/DL
LEUKOCYTE ESTERASE, URINE: ABNORMAL
MICROSCOPIC EXAMINATION: YES
NITRITE, URINE: NEGATIVE
PH UA: 6 (ref 5–8)
PROTEIN UA: 30 MG/DL
RBC UA: ABNORMAL /HPF (ref 0–4)
SPECIFIC GRAVITY UA: 1.02 (ref 1–1.03)
URINE TYPE: ABNORMAL
UROBILINOGEN, URINE: 0.2 E.U./DL
WBC UA: >100 /HPF (ref 0–5)

## 2021-01-11 PROCEDURE — 87086 URINE CULTURE/COLONY COUNT: CPT

## 2021-01-11 PROCEDURE — 81001 URINALYSIS AUTO W/SCOPE: CPT

## 2021-01-11 PROCEDURE — 87186 SC STD MICRODIL/AGAR DIL: CPT

## 2021-01-11 PROCEDURE — 87077 CULTURE AEROBIC IDENTIFY: CPT

## 2021-01-11 RX ORDER — NITROFURANTOIN 25; 75 MG/1; MG/1
100 CAPSULE ORAL 2 TIMES DAILY
Qty: 10 CAPSULE | Refills: 0 | Status: SHIPPED | OUTPATIENT
Start: 2021-01-11 | End: 2021-03-16 | Stop reason: SDUPTHER

## 2021-01-11 RX ORDER — NITROFURANTOIN 25; 75 MG/1; MG/1
100 CAPSULE ORAL 2 TIMES DAILY
Qty: 10 CAPSULE | Refills: 0 | Status: CANCELLED | OUTPATIENT
Start: 2021-01-11 | End: 2021-01-16

## 2021-01-11 NOTE — TELEPHONE ENCOUNTER
Patient is calling stating that she is having burning and frequent urination. Patient would like to be seen or have something called in.  Been like this for 2 days  Please advise  Maíra Omer [phone]

## 2021-01-11 NOTE — TELEPHONE ENCOUNTER
Pt unable to complete a e-visit due to not knowing how to work [employer] but will be stopping by to drop off a urine specimen.

## 2021-01-11 NOTE — TELEPHONE ENCOUNTER
E visit. UA at lab ordered. Medication will be called in once know pharmacy. Please inquire about pharmacy.

## 2021-01-12 ENCOUNTER — TELEPHONE (OUTPATIENT)
Dept: FAMILY MEDICINE CLINIC | Age: 71
End: 2021-01-12

## 2021-01-12 DIAGNOSIS — B96.20 E-COLI UTI: Primary | ICD-10-CM

## 2021-01-12 DIAGNOSIS — N39.0 E-COLI UTI: Primary | ICD-10-CM

## 2021-01-12 RX ORDER — NITROFURANTOIN 25; 75 MG/1; MG/1
100 CAPSULE ORAL 2 TIMES DAILY
Qty: 14 CAPSULE | Refills: 0 | Status: SHIPPED | OUTPATIENT
Start: 2021-01-12 | End: 2021-01-19

## 2021-01-12 NOTE — TELEPHONE ENCOUNTER
Pt thinks she has a UTI and does not know what to do. She has tried calling the office all day and has not gotten through to anyone. Pt. got a urine culture on 1/11/21 and was not called about the results. Was advised by my practice manager to call Tiffany's cell and she is handling it from here.

## 2021-01-13 LAB
ORGANISM: ABNORMAL
URINE CULTURE, ROUTINE: ABNORMAL

## 2021-01-22 ENCOUNTER — TELEPHONE (OUTPATIENT)
Dept: FAMILY MEDICINE CLINIC | Age: 71
End: 2021-01-22

## 2021-01-22 DIAGNOSIS — N39.0 E-COLI UTI: ICD-10-CM

## 2021-01-22 DIAGNOSIS — B96.20 E-COLI UTI: ICD-10-CM

## 2021-01-22 RX ORDER — NITROFURANTOIN 25; 75 MG/1; MG/1
100 CAPSULE ORAL 2 TIMES DAILY
Qty: 14 CAPSULE | Refills: 0 | OUTPATIENT
Start: 2021-01-22 | End: 2021-01-29

## 2021-01-22 NOTE — TELEPHONE ENCOUNTER
No refill, pt needs appointment to discuss. Per chart review, last Ecoli UTI was sensitive to macrobid, therefore if she completed treatment, but is still having symptoms, she will need an appointment to discuss. We will likely need to recheck a urine sample to see if the bacteria cleared with the last couse of antibiotics. If it did not clear up with the last course of antibiotics, we will need to investigate causes further. Thank you.

## 2021-01-22 NOTE — TELEPHONE ENCOUNTER
Pt has been informed. PT states that she is having frequent urination, has went through 4 pair of underwear since yesterday and would like for a prescription to be called in so that she doesn't have to deal with this the whole weekend. PT is scheduled for an appointment on 1/26/21.     Please advise e

## 2021-01-22 NOTE — TELEPHONE ENCOUNTER
----- Message from Francis Shipley sent at 1/22/2021 10:49 AM EST -----  Subject: Message to Provider    QUESTIONS  Information for Provider? Pt needs her current pcp removed from chart so   she can be scheduled with someone else.   ---------------------------------------------------------------------------  --------------  CALL BACK INFO  What is the best way for the office to contact you? Do not leave any   message   patient will call back for answer  Preferred Call Back Phone Number? [phone]  ---------------------------------------------------------------------------  --------------  SCRIPT ANSWERS  Relationship to Patient?  Self

## 2021-01-22 NOTE — TELEPHONE ENCOUNTER
Called and spoke with pt. Refill request sent to provider. Pt will keep Dr Lilliam Lizarraga as provider and will give the office a call to schedule an appointment if the symptoms are not cleared once she is done taking the medication.   Close Encounter

## 2021-01-22 NOTE — TELEPHONE ENCOUNTER
Recommend ER visit if she is having new onset bladder incontinence. Please note that she needs to be seen for this, as last time, she only dropped off her urine at the lab and did not discuss or see a provider. She will likely need further workup as a UTI does not typically cause urinary leakage or incontinence. She was also treated appropriately for the last Ecoli UTI with macrobid (sensitive per culture). I recommend she see a urologist/urogynecologist. You can place referral if she agrees. Thank you.

## 2021-01-22 NOTE — TELEPHONE ENCOUNTER
Pt states that the symptoms went away for a day and then came back. Pt states that she will make an apt if the symptoms are not gone after this refill.     Please advise

## 2021-02-11 ENCOUNTER — TELEPHONE (OUTPATIENT)
Dept: PRIMARY CARE CLINIC | Age: 71
End: 2021-02-11

## 2021-02-11 DIAGNOSIS — M51.26 DISPLACEMENT OF LUMBAR INTERVERTEBRAL DISC WITHOUT MYELOPATHY: Chronic | ICD-10-CM

## 2021-02-11 DIAGNOSIS — M16.0 PRIMARY OSTEOARTHRITIS OF BOTH HIPS: ICD-10-CM

## 2021-02-11 DIAGNOSIS — M51.36 DDD (DEGENERATIVE DISC DISEASE), LUMBAR: Primary | ICD-10-CM

## 2021-02-11 NOTE — TELEPHONE ENCOUNTER
Patient is calling needing a new referral for physical therapy would like it for 99 visits if we can she will need it sent to [facility], [address] [facility], [address] [place] [address]   [phone]    Please advise  Eleni Hess [phone] (home)

## 2021-02-12 NOTE — TELEPHONE ENCOUNTER
Order placed. Will await PT evaluation and treatment plan to determine # of visits    1. DDD (degenerative disc disease), lumbar  2. Displacement of lumbar intervertebral disc without myelopathy  3.  Primary osteoarthritis of both hips  - [facility] - [facility]

## 2021-03-16 ENCOUNTER — TELEPHONE (OUTPATIENT)
Dept: PRIMARY CARE CLINIC | Age: 71
End: 2021-03-16

## 2021-03-16 ENCOUNTER — OFFICE VISIT (OUTPATIENT)
Dept: PRIMARY CARE CLINIC | Age: 71
End: 2021-03-16
Payer: MEDICARE

## 2021-03-16 ENCOUNTER — HOSPITAL ENCOUNTER (OUTPATIENT)
Age: 71
Discharge: HOME | End: 2021-03-16
Payer: MEDICARE

## 2021-03-16 VITALS
SYSTOLIC BLOOD PRESSURE: 135 MMHG | HEART RATE: 82 BPM | HEIGHT: 64 IN | WEIGHT: 215.6 LBS | OXYGEN SATURATION: 98 % | BODY MASS INDEX: 36.81 KG/M2 | TEMPERATURE: 98.1 F | DIASTOLIC BLOOD PRESSURE: 85 MMHG

## 2021-03-16 DIAGNOSIS — R30.0 DYSURIA: ICD-10-CM

## 2021-03-16 DIAGNOSIS — E66.9 OBESITY (BMI 30-39.9): ICD-10-CM

## 2021-03-16 DIAGNOSIS — N39.46 MIXED STRESS AND URGE URINARY INCONTINENCE: ICD-10-CM

## 2021-03-16 DIAGNOSIS — R35.0 URINARY FREQUENCY: Primary | ICD-10-CM

## 2021-03-16 DIAGNOSIS — E03.9 HYPOTHYROIDISM, UNSPECIFIED TYPE: ICD-10-CM

## 2021-03-16 DIAGNOSIS — L98.9 CHEST SKIN LESION: ICD-10-CM

## 2021-03-16 DIAGNOSIS — R35.0 URINARY FREQUENCY: ICD-10-CM

## 2021-03-16 DIAGNOSIS — L98.9 EXTERNAL NASAL LESION: ICD-10-CM

## 2021-03-16 DIAGNOSIS — F41.9 ANXIETY: ICD-10-CM

## 2021-03-16 LAB
BACTERIA: ABNORMAL /HPF
BILIRUBIN URINE: NEGATIVE
BLOOD, URINE: NEGATIVE
CLARITY: CLEAR
COLOR: YELLOW
EPITHELIAL CELLS, UA: ABNORMAL /HPF (ref 0–5)
GLUCOSE URINE: NEGATIVE MG/DL
KETONES, URINE: NEGATIVE MG/DL
LEUKOCYTE ESTERASE, URINE: ABNORMAL
MICROSCOPIC EXAMINATION: YES
MUCUS: ABNORMAL /LPF
NITRITE, URINE: NEGATIVE
PH UA: 6 (ref 5–8)
PROTEIN UA: NEGATIVE MG/DL
RBC UA: ABNORMAL /HPF (ref 0–4)
SPECIFIC GRAVITY UA: 1.02 (ref 1–1.03)
T4 FREE: 1.6 NG/DL (ref 0.9–1.8)
TSH SERPL DL<=0.05 MIU/L-ACNC: 2.34 UIU/ML (ref 0.27–4.2)
URINE TYPE: ABNORMAL
UROBILINOGEN, URINE: 0.2 E.U./DL
WBC UA: ABNORMAL /HPF (ref 0–5)

## 2021-03-16 PROCEDURE — 36415 COLL VENOUS BLD VENIPUNCTURE: CPT

## 2021-03-16 PROCEDURE — 84439 ASSAY OF FREE THYROXINE: CPT

## 2021-03-16 PROCEDURE — 81001 URINALYSIS AUTO W/SCOPE: CPT

## 2021-03-16 PROCEDURE — 84443 ASSAY THYROID STIM HORMONE: CPT

## 2021-03-16 PROCEDURE — 99214 OFFICE O/P EST MOD 30 MIN: CPT | Performed by: FAMILY MEDICINE

## 2021-03-16 PROCEDURE — 87086 URINE CULTURE/COLONY COUNT: CPT

## 2021-03-16 RX ORDER — CETIRIZINE HYDROCHLORIDE 10 MG/1
10 TABLET ORAL DAILY
COMMUNITY
End: 2021-11-29

## 2021-03-16 RX ORDER — NITROFURANTOIN 25; 75 MG/1; MG/1
100 CAPSULE ORAL 2 TIMES DAILY
Qty: 10 CAPSULE | Refills: 0 | Status: SHIPPED | OUTPATIENT
Start: 2021-03-16 | End: 2021-06-02 | Stop reason: SDUPTHER

## 2021-03-16 ASSESSMENT — PATIENT HEALTH QUESTIONNAIRE - PHQ9
SUM OF ALL RESPONSES TO PHQ QUESTIONS 1-9: 0
2. FEELING DOWN, DEPRESSED OR HOPELESS: 0
SUM OF ALL RESPONSES TO PHQ QUESTIONS 1-9: 0

## 2021-03-16 ASSESSMENT — ENCOUNTER SYMPTOMS
COLOR CHANGE: 1
DIARRHEA: 1
SHORTNESS OF BREATH: 1
ABDOMINAL PAIN: 1
TROUBLE SWALLOWING: 0
CONSTIPATION: 1

## 2021-03-16 NOTE — TELEPHONE ENCOUNTER
----- Message from Rashmi Carrasco sent at 3/16/2021  1:13 PM EDT -----  Subject: Message to Provider    QUESTIONS  Information for Provider? pt is stating that the referral she received for   psychiatry is too far. .would someone closer or in office with  please   call pt and advise   ---------------------------------------------------------------------------  --------------  [address]  What is the best way for the office to contact you? OK to leave message on   voicemail  Preferred Call Back Phone Number? [phone]  ---------------------------------------------------------------------------  --------------  SCRIPT ANSWERS  Relationship to Patient?  Self

## 2021-03-16 NOTE — TELEPHONE ENCOUNTER
PT informed to  script for [facility] from pharmacy.   Close Encounter

## 2021-03-16 NOTE — PROGRESS NOTES
Chief Complaint   Patient presents with    Urinary Tract Infection     started today, couldn't make it     Incontinence    Anxiety    Lesion(s)    Skin Exam    Obesity    Hypothyroidism     HPI:    Jenn Santamaria is a 74 y/o female here for spot on her nose, UTI concerns, hypothyroidism,     -Nasal lesion and chest lesion:  Noticed in past 12 months  No change is shape, color, size. More observant. No discharge or associated symptoms  Just a concern to make sure nothing cancerous or concerning  Sister had nasal lesions that were actinic keratosis, basal/squamous but no melanoma. -UTI:  Woke up this morning  Couldn't quite make it to the bathroom  Gets more frequency/incontinence when she has an infection  Has IBS per patient. Sometimes disagrees. No pain with urination  No blood or discoloration. No flank pain.   No bladder or suprapubic  No fever or chills or rashes    -Hypothyroidism:  Lab Results   Component Value Date    TSH 2.23 07/16/2019    S9IFNGY 1.22 08/13/2013    T4FREE 1.5 07/16/2019   Synthroid 100 mcg daily    -Anxiety:  Before shut down was taken off klonopin  Was not doing well  Throughout this whole thing had to go to the hospital once  Told her the symptoms, went to hospital for concerns for stroke vs heart attack  Turned out to be anxiety per patient  Wakes up hearts beating and anxiety  Wants to go back on klonopin, requests today      Past Medical History:   Diagnosis Date    Allergic rhinitis     Anxiety     Asthma     Bipolar 1 disorder ([address]     questionable dx per patient    Chronic low back pain     COPD (chronic obstructive pulmonary disease) ([address]     Decreased bone density 9-9-2008    GERD (gastroesophageal reflux disease)     H/O vulvar dysplasia 1/9/2015    VERONICA 3 wide local excision     Hyperlipidemia     Hypothyroid     Hypothyroidism     Insomnia     Osteoarthritis     Urinary incontinence      Past Surgical History:   Procedure Laterality Date    COLONOSCOPY  18 Apr 2016    Diverticulosis, hemorrhoid    HYSTERECTOMY  2001    Partial ? Endometriosis - Benign    TUBAL LIGATION  1981     Current Outpatient Medications   Medication Sig Dispense Refill    hydrocortisone 2.5 % cream Place rectally as needed      cetirizine (ZYRTEC) 10 MG tablet Take 10 mg by mouth daily      pantoprazole (PROTONIX) 40 MG tablet Take 1 tablet by mouth every morning (before breakfast) 90 tablet 1    albuterol sulfate HFA (VENTOLIN HFA) 108 (90 Base) MCG/ACT inhaler Inhale 2 puffs into the lungs every 6 hours as needed for Wheezing 3 Inhaler 1    methocarbamol (ROBAXIN) 500 MG tablet TAKE 1 TABLET 3 TIMES A DAY 90 tablet 5    montelukast (SINGULAIR) 10 MG tablet Take 1 tablet by mouth nightly 90 tablet 1    ibuprofen (ADVIL;MOTRIN) 400 MG tablet 1-2 po tid prn with food 180 tablet 0    levothyroxine (SYNTHROID) 100 MCG tablet TAKE 1 TABLET EVERY DAY 90 tablet 1    Handicap Placard MISC by Does not apply route Good for 5  years 1 each 0    oxybutynin (DITROPAN) 5 MG tablet Take 1 tablet by mouth 2 times daily 180 tablet 1    triamcinolone (NASACORT ALLERGY 24HR) 55 MCG/ACT nasal inhaler 2 sprays by Each Nostril route daily 1 Inhaler 0    ibuprofen (ADVIL;MOTRIN) 400 MG tablet TAKE 1 TO 2 TABLETS BY MOUTH EVERY 8 HOURS AS NEEDED FOR PAIN.  TAKE WITH FOOD AND USE SPARINGLY 180 tablet 0    Omega-3 Fatty Acids (FISH OIL) 1000 MG CAPS Take 3,000 mg by mouth 2 times daily      Coenzyme Q10 200 MG CAPS Take 1 tablet by mouth daily 90 capsule 3    Estradiol (VAGIFEM) 10 MCG TABS vaginal tablet       folic acid (FOLVITE) 596 MCG tablet Take 400 mcg by mouth daily      Zinc 50 MG TABS Take by mouth      Ascorbic Acid (VITAMIN C) 1000 MG tablet Take 1,000 mg by mouth daily      Calcium Carbonate Antacid 1000 MG CHEW Take by mouth      Glucos-MSM-C-Js-Rlhwck-Xqajph (BL MSM GLUCOSAMINE COMPLEX PO) Take by mouth      [facility] VITAMIN D3 1000 units CAPS TAKE 1 SOFTGEL BY MOUTH DAILY 90 capsule 1    imiquimod (ALDARA) 5 % cream Apply topically three times a week Indications: [facility] ordering Apply topically three times a week. 12 each 3    Psyllium (METAMUCIL) 28.3 % POWD Take 1 each by mouth daily      lactase (LACTAID) 3000 UNITS tablet Take 1 tablet by mouth 3 times daily (with meals) 90 tablet 3    hydrocortisone (ANUSOL-HC) 2.5 % rectal cream Place rectally 2 times daily. 1 Tube 1    acetaminophen (TYLENOL) 325 MG tablet Take 650 mg by mouth every 6 hours as needed for Pain       No current facility-administered medications for this visit. Allergies   Allergen Reactions    Latex Rash    Penicillins Shortness Of Breath    Lipitor [Atorvastatin Calcium] Other (See Comments)     Muscle cramps    Nexium [Esomeprazole Magnesium] Other (See Comments)     Cramps, including feet, legs, and sides of abdomen    Bactrim [Sulfamethoxazole-Trimethoprim] Other (See Comments)     Dry mouth    Ciprofloxacin Rash     Rash, headache    Etodolac Rash    Flagyl [Metronidazole] Rash    Naprosyn [Naproxen] Nausea And Vomiting     GI upset    Prednisone Other (See Comments)     Headache, increased BP     Family History   Problem Relation Age of Onset    Heart Disease Mother     Cancer Mother         uterine?     Mental Illness Father     Cancer Father         prostate    Stroke Father     Schizophrenia Father     Cancer Sister         skin    Bipolar Disorder Sister     Depression Son         PTSD    OCD Son      Social History     Socioeconomic History    Marital status: Single     Spouse name: Not on file    Number of children: 2    Years of education: 15    Highest education level: High school graduate   Occupational History    Occupation: retired   Social Needs    Financial resource strain: Not hard at all   [place] insecurity     Worry: Never true     Inability: Never true    Transportation needs     Medical: No     Non-medical: No   Tobacco Use    Smoking status: Former Smoker     Packs/day: 1.00     Years: 35.00     Pack years: 35.00     Types: Cigarettes     Quit date: 2010     Years since quittin.2    Smokeless tobacco: Never Used   Substance and Sexual Activity    Alcohol use: No     Alcohol/week: 0.0 standard drinks    Drug use: No    Sexual activity: Not Currently     Partners: Male   Lifestyle    Physical activity     Days per week: 1 day     Minutes per session: Not on file    Stress: Very much   Relationships    Social connections     Talks on phone: Twice a week     Gets together: Never     Attends Scientologist service: Never     Active member of club or organization: No     Attends meetings of clubs or organizations: Never     Relationship status:     Intimate partner violence     Fear of current or ex partner: Patient refused     Emotionally abused: Patient refused     Physically abused: Patient refused     Forced sexual activity: Patient refused   Other Topics Concern    Not on file   Social History Narrative    Daughter lucille    Son/grand son in [place]        Yoga stretches      Review of Systems   Constitutional: Negative for activity change, appetite change, chills, fatigue and fever. HENT: Negative for congestion, hearing loss and trouble swallowing. Eyes: Negative for visual disturbance. Respiratory: Positive for shortness of breath. Cardiovascular: Positive for chest pain. Gastrointestinal: Positive for abdominal pain, constipation and diarrhea. Genitourinary: Positive for difficulty urinating (incontinence) and frequency. Skin: Positive for color change. Skin lesions on nose and chest   Psychiatric/Behavioral: Positive for sleep disturbance. The patient is nervous/anxious.         Vitals:    21 1112   BP: 135/85   Pulse: 82   Temp: 98.1 °F (36.7 °C)   TempSrc: Temporal   SpO2: 98%   Weight: 215 lb 9.6 oz (97.8 kg)   Height: 5' 3.9\" (1.623 m)       Physical Exam  Constitutional:       General: She is not in acute distress. Appearance: She is well-developed. HENT:      Head: Normocephalic and atraumatic. Right Ear: Tympanic membrane normal.      Left Ear: Tympanic membrane normal.      Nose: Nose normal. No rhinorrhea. Mouth/Throat:      Pharynx: Uvula midline. Eyes:      Pupils: Pupils are equal, round, and reactive to light. Neck:      Vascular: Normal carotid pulses. No carotid bruit. Trachea: No tracheal deviation. Cardiovascular:      Rate and Rhythm: Normal rate and regular rhythm. Pulses:           Carotid pulses are 2+ on the right side and 2+ on the left side. Radial pulses are 2+ on the right side and 2+ on the left side. Posterior tibial pulses are 2+ on the right side and 2+ on the left side. Heart sounds: Normal heart sounds. No murmur. No friction rub. No gallop. Pulmonary:      Effort: Pulmonary effort is normal. No respiratory distress. Breath sounds: Normal breath sounds. No wheezing or rales. Abdominal:      General: Bowel sounds are normal. There is no distension. Palpations: Abdomen is soft. Tenderness: There is no abdominal tenderness. There is no rebound. Musculoskeletal: Normal range of motion. General: No tenderness. Lymphadenopathy:      Cervical: No cervical adenopathy. Skin:     General: Skin is warm and dry. Findings: No erythema or rash. Neurological:      Mental Status: She is alert and oriented to person, place, and time. Cranial Nerves: No cranial nerve deficit. Deep Tendon Reflexes:      Reflex Scores:       Patellar reflexes are 2+ on the right side and 2+ on the left side. Psychiatric:         Attention and Perception: She is inattentive. Mood and Affect: Mood is anxious and depressed. Speech: Speech is tangential.         Behavior: Behavior is hyperactive. Thought Content: Thought content does not include homicidal or suicidal ideation.      Skin:   A less than 6 mm lesion on the nasal bridge of the right side, skin colored, brown and white, well-circumscribed    Slightly greater than 6 mm lesion on the right chest wall, raised, velvety, concerns or SK      A less than 6 mm lesion on the nasal bridge of the right side, skin colored, brown and white, well-circumscribed          Assessment and Plan:  Deion Ramsey is a 72-year-old female who presents today for urinary frequency and incontinence, skin lesions, anxiety, hypothyroidism. Patient also with complaint of chronic back pain. Obesity. Watchful waiting for skin lesions. UA and culture for urinary frequency. Thyroid monitoring and continue Synthroid. Diet and exercise recommended as below for obesity/weight loss. Referral to psychiatry for anxiety as patient desires to take benzodiazepine medication rather than daily medication and rather than seeing behavioral health in our office here. Follow-up in 6 months for annual Medicare well visit. 1. Urinary frequency  2. Mixed stress and urge urinary incontinence  -oxybutynin 5 mg BID; await UA/CX  - URINALYSIS WITH MICROSCOPIC; Future  - Culture, Urine; Future    3. Anxiety  Recommend daily medication; will not Rx klonopin for patient  - [address] [place] [address]    4. Hypothyroidism, unspecified type  Synthroid 100 mcg daily  - TSH without Reflex; Future  - T4, Free; Future    5. Chest skin lesion  6. External nasal lesion  -See above pictures; neither lesion concerning for SCC, BCC, melanoma; AK vs SK vs sun spot  -watchful waiting; bx vs cryo vs derm referral    7.   Obesity  -Recommend 150 minutes of cardiovascular activity a week, or 10,000 to 15,000 steps a day, 2 days of weightbearing  -dietary wise, try to stick to your weight x 10 in calories a day  -avoid processed/refined carbohydrates (boxed/canned/frozen/fast)  -encourage healthy protein and fat, butter, avocado, egg      Spent >30 minutes of face to face interaction with patient counseling

## 2021-03-16 NOTE — PATIENT INSTRUCTIONS
Get thyroid blood work, urine labs for thyorid and incontinence of urine respectively    Monitor spots on chest and nose  Consider biopsy or cyrotherapy if persist    See psychiatry for anxiety    Consider daily medication for anxiety      -Recommend 150 minutes of cardiovascular activity a week, or 10,000 to 15,000 steps a day, 2 days of weightbearing  -dietary wise, try to stick to your weight x 10 in calories a day  -avoid processed/refined carbohydrates (boxed/canned/frozen/fast)  -encourage healthy protein and fat, butter, avocado, egg

## 2021-03-16 NOTE — TELEPHONE ENCOUNTER
----- Message from Davis Phillips sent at 3/16/2021  1:28 PM EDT -----  Subject: Message to Provider    QUESTIONS  Information for Provider? Pt called wanting her urine test results   explained to her. She can not access it on my chart   ---------------------------------------------------------------------------  --------------  [address]  What is the best way for the office to contact you? Do not leave any   message   patient will call back for answer  Preferred Call Back Phone Number? [phone]  ---------------------------------------------------------------------------  --------------  SCRIPT ANSWERS  Relationship to Patient?  Self

## 2021-03-17 LAB — URINE CULTURE, ROUTINE: NORMAL

## 2021-04-21 ENCOUNTER — OFFICE VISIT (OUTPATIENT)
Dept: PRIMARY CARE CLINIC | Age: 71
End: 2021-04-21
Payer: MEDICARE

## 2021-04-21 VITALS
TEMPERATURE: 97.6 F | DIASTOLIC BLOOD PRESSURE: 89 MMHG | OXYGEN SATURATION: 97 % | BODY MASS INDEX: 35.54 KG/M2 | SYSTOLIC BLOOD PRESSURE: 131 MMHG | HEART RATE: 86 BPM | WEIGHT: 206.4 LBS

## 2021-04-21 DIAGNOSIS — M54.50 CHRONIC BILATERAL LOW BACK PAIN WITHOUT SCIATICA: ICD-10-CM

## 2021-04-21 DIAGNOSIS — J30.9 ALLERGIC RHINITIS, UNSPECIFIED SEASONALITY, UNSPECIFIED TRIGGER: ICD-10-CM

## 2021-04-21 DIAGNOSIS — G89.29 CHRONIC BILATERAL LOW BACK PAIN WITHOUT SCIATICA: ICD-10-CM

## 2021-04-21 DIAGNOSIS — J30.2 SEASONAL ALLERGIES: Primary | ICD-10-CM

## 2021-04-21 LAB
BILIRUBIN, POC: NORMAL
BLOOD URINE, POC: NORMAL
CLARITY, POC: CLEAR
COLOR, POC: NORMAL
GLUCOSE URINE, POC: NORMAL
KETONES, POC: NORMAL
LEUKOCYTE EST, POC: NORMAL
NITRITE, POC: NORMAL
PH, POC: 5
PROTEIN, POC: NORMAL
SPECIFIC GRAVITY, POC: 1.02
UROBILINOGEN, POC: 0.2

## 2021-04-21 PROCEDURE — 99214 OFFICE O/P EST MOD 30 MIN: CPT | Performed by: FAMILY MEDICINE

## 2021-04-21 PROCEDURE — 81002 URINALYSIS NONAUTO W/O SCOPE: CPT | Performed by: FAMILY MEDICINE

## 2021-04-21 RX ORDER — FLUTICASONE PROPIONATE 50 MCG
2 SPRAY, SUSPENSION (ML) NASAL DAILY
Qty: 1 BOTTLE | Refills: 0 | Status: SHIPPED | OUTPATIENT
Start: 2021-04-21 | Stop reason: SDUPTHER

## 2021-04-21 RX ORDER — CETIRIZINE HYDROCHLORIDE 10 MG/1
10 TABLET ORAL NIGHTLY
Qty: 90 TABLET | Refills: 0 | Status: SHIPPED | OUTPATIENT
Start: 2021-04-21 | End: 2021-06-30

## 2021-04-21 RX ORDER — IBUPROFEN 400 MG/1
TABLET ORAL
Qty: 180 TABLET | Refills: 0 | Status: CANCELLED | OUTPATIENT
Start: 2021-04-21

## 2021-04-21 RX ORDER — MONTELUKAST SODIUM 10 MG/1
10 TABLET ORAL NIGHTLY
Qty: 90 TABLET | Refills: 1 | Status: SHIPPED | OUTPATIENT
Start: 2021-04-21 | End: 2021-06-24 | Stop reason: SDUPTHER

## 2021-04-21 RX ORDER — FLUTICASONE PROPIONATE 50 MCG
SPRAY, SUSPENSION (ML) NASAL
Qty: 48 G | Refills: 1 | Status: SHIPPED | OUTPATIENT
Start: 2021-04-21 | End: 2021-09-30 | Stop reason: SDUPTHER

## 2021-04-21 ASSESSMENT — ENCOUNTER SYMPTOMS
SINUS PRESSURE: 0
COUGH: 0
SHORTNESS OF BREATH: 0
SINUS PAIN: 0
FACIAL SWELLING: 0
SORE THROAT: 0
VOICE CHANGE: 0
TROUBLE SWALLOWING: 0
VOMITING: 0
NAUSEA: 0

## 2021-04-21 NOTE — PROGRESS NOTES
Gracie Jha  1950    Allergies   Allergen Reactions    Latex Rash    Penicillins Shortness Of Breath    Lipitor [Atorvastatin Calcium] Other (See Comments)     Muscle cramps    Nexium [Esomeprazole Magnesium] Other (See Comments)     Cramps, including feet, legs, and sides of abdomen    Bactrim [Sulfamethoxazole-Trimethoprim] Other (See Comments)     Dry mouth    Ciprofloxacin Rash     Rash, headache    Etodolac Rash    Flagyl [Metronidazole] Rash    Naprosyn [Naproxen] Nausea And Vomiting     GI upset    Prednisone Other (See Comments)     Headache, increased BP     Current Outpatient Medications   Medication Sig Dispense Refill    montelukast (SINGULAIR) 10 MG tablet Take 1 tablet by mouth nightly 90 tablet 1    cetirizine (ZYRTEC) 10 MG tablet Take 1 tablet by mouth nightly 90 tablet 0    fluticasone (FLONASE) 50 MCG/ACT nasal spray 2 sprays by Each Nostril route daily 1 Bottle 0    hydrocortisone 2.5 % cream Place rectally as needed      cetirizine (ZYRTEC) 10 MG tablet Take 10 mg by mouth daily      pantoprazole (PROTONIX) 40 MG tablet Take 1 tablet by mouth every morning (before breakfast) 90 tablet 1    albuterol sulfate HFA (VENTOLIN HFA) 108 (90 Base) MCG/ACT inhaler Inhale 2 puffs into the lungs every 6 hours as needed for Wheezing 3 Inhaler 1    methocarbamol (ROBAXIN) 500 MG tablet TAKE 1 TABLET 3 TIMES A DAY 90 tablet 5    ibuprofen (ADVIL;MOTRIN) 400 MG tablet 1-2 po tid prn with food 180 tablet 0    levothyroxine (SYNTHROID) 100 MCG tablet TAKE 1 TABLET EVERY DAY 90 tablet 1    Handicap Placard MISC by Does not apply route Good for 5  years 1 each 0    oxybutynin (DITROPAN) 5 MG tablet Take 1 tablet by mouth 2 times daily 180 tablet 1    triamcinolone (NASACORT ALLERGY 24HR) 55 MCG/ACT nasal inhaler 2 sprays by Each Nostril route daily 1 Inhaler 0    ibuprofen (ADVIL;MOTRIN) 400 MG tablet TAKE 1 TO 2 TABLETS BY MOUTH EVERY 8 HOURS AS NEEDED FOR PAIN.  TAKE WITH FOOD AND USE SPARINGLY 180 tablet 0    Omega-3 Fatty Acids (FISH OIL) 1000 MG CAPS Take 3,000 mg by mouth 2 times daily      Coenzyme Q10 200 MG CAPS Take 1 tablet by mouth daily 90 capsule 3    Estradiol (VAGIFEM) 10 MCG TABS vaginal tablet       folic acid (FOLVITE) 291 MCG tablet Take 400 mcg by mouth daily      Zinc 50 MG TABS Take by mouth      Ascorbic Acid (VITAMIN C) 1000 MG tablet Take 1,000 mg by mouth daily      Calcium Carbonate Antacid 1000 MG CHEW Take by mouth      Glucos-MSM-C-Dk-Acloqt-Rzrmyt (BL MSM GLUCOSAMINE COMPLEX PO) Take by mouth      [facility] VITAMIN D3 1000 units CAPS TAKE 1 SOFTGEL BY MOUTH DAILY 90 capsule 1    imiquimod (ALDARA) 5 % cream Apply topically three times a week Indications: [facility] ordering Apply topically three times a week. 12 each 3    Psyllium (METAMUCIL) 28.3 % POWD Take 1 each by mouth daily      lactase (LACTAID) 3000 UNITS tablet Take 1 tablet by mouth 3 times daily (with meals) 90 tablet 3    hydrocortisone (ANUSOL-HC) 2.5 % rectal cream Place rectally 2 times daily. 1 Tube 1    acetaminophen (TYLENOL) 325 MG tablet Take 650 mg by mouth every 6 hours as needed for Pain       No current facility-administered medications for this visit. Consultants:   Patient Care Team:  [facility]. Mau Rush DO as PCP - General (Family Medicine)  [facility]. Mau Rush DO as PCP - [facility]  RUSLAN Kelley CNP as PCP - Hematology/Oncology (Nurse Practitioner)  Reed Mccarty MD as Obstetrician (Obstetrics & Gynecology)  RUSLAN Kelley CNP as Advanced Practice Nurse (Gynecologic Oncology)    Chief Complaint:     Arianna Campos is a 73 y.o. female  who presents for Established Patient with Personalized Prevention Plan Services.     HPI:     82-year-old  female, non-smoker with multiple days including GERD, hypothyroidism, vaginal atrophy, lumbosacral spondylolysis, DDD of the lumbar spine, hyperlipidemia, and and anxiety, ex-smoker, obesity, presents today for the following:    She is worsening. Patient also requests that we test her blood for mold because she lives in section 8 housing and one somebody to take care of a water leak. As for her complaints today she states that she has a runny nose, posterior nasal drainage, occasional ear pressure with an occasional morning cough, has been taking over-the-counter [facility] Benadryl as well as nasal spray without much relief. Declines fevers chills or other constitutional symptoms. \"I want to pee in a cup bc my back hurts\". Patient states that she might be getting a urinary tract infection and she feels a little pain in her back today and would like to have this checked. Declines any flank pain suprapubic pain or other constitutional symptoms. Hyperlipidemia. Patient states that she does not want to take any cholesterol medication because it causes muscle cramps. Patient declines any other acute complaints or concerns today. Patient Active Problem List   Diagnosis    Hypothyroid    GERD (gastroesophageal reflux disease)    Hyperlipidemia    Former smoker    H/O vulvar dysplasia    Localized osteoarthrosis, lower leg    Vaginal atrophy    DDD (degenerative disc disease), lumbar    Osteoarthritis of both hips    Chest skin lesion    Lumbosacral spondylosis without myelopathy    Displacement of lumbar intervertebral disc without myelopathy    First degree hemorrhoids    Anxiety    Primary insomnia    Chronic midline thoracic back pain    Mild intermittent asthma without complication    Tonsil stone    Centrilobular emphysema (HCC)    Jaw pain    Mixed stress and urge urinary incontinence    External nasal lesion    Obesity (BMI 30-39. 9)         Past Medical History:    Past Medical History:   Diagnosis Date    Allergic rhinitis     Anxiety     Asthma     Bipolar 1 disorder ([facility] [address]     questionable dx per patient    Chronic low back pain     COPD (chronic sprays by Each Nostril route daily Yes Emy Cano MD   ibuprofen (ADVIL;MOTRIN) 400 MG tablet TAKE 1 TO 2 TABLETS BY MOUTH EVERY 8 HOURS AS NEEDED FOR PAIN. TAKE WITH FOOD AND USE SPARINGLY Yes Emy Cano MD   Omega-3 Fatty Acids (FISH OIL) 1000 MG CAPS Take 3,000 mg by mouth 2 times daily Yes Historical Provider, MD   Coenzyme Q10 200 MG CAPS Take 1 tablet by mouth daily Yes Orion Yeh MD   Estradiol (VAGIFEM) 10 MCG TABS vaginal tablet  Yes Historical Provider, MD   folic acid (FOLVITE) 004 MCG tablet Take 400 mcg by mouth daily Yes Historical Provider, MD   Zinc 50 MG TABS Take by mouth Yes Historical Provider, MD   Ascorbic Acid (VITAMIN C) 1000 MG tablet Take 1,000 mg by mouth daily Yes Historical Provider, MD   Calcium Carbonate Antacid 1000 MG CHEW Take by mouth Yes Historical Provider, MD   Glucos-MSM-C-An-Ntztsd-Xgraiu (BL MSM GLUCOSAMINE COMPLEX PO) Take by mouth Yes Historical Provider, MD   CVS VITAMIN D3 1000 units CAPS TAKE 1 SOFTGEL BY MOUTH DAILY Yes RUSLAN Ramsey CNP   imiquimod (ALDARA) 5 % cream Apply topically three times a week Indications: [facility] ordering Apply topically three times a week. Yes RUSLAN Kendrick CNP   Psyllium (METAMUCIL) 28.3 % POWD Take 1 each by mouth daily Yes Historical Provider, MD   lactase (LACTAID) 3000 UNITS tablet Take 1 tablet by mouth 3 times daily (with meals) Yes ANJUM Dela Cruz MD   hydrocortisone (ANUSOL-HC) 2.5 % rectal cream Place rectally 2 times daily. Yes RUSLAN Kendrick CNP   acetaminophen (TYLENOL) 325 MG tablet Take 650 mg by mouth every 6 hours as needed for Pain Yes Historical Provider, MD   tolterodine (DETROL LA) 4 MG ER capsule Take 1 capsule by mouth daily.   Amina Reyes MD       Allergies:    Latex, Penicillins, Lipitor [atorvastatin calcium], Nexium [esomeprazole magnesium], Bactrim [sulfamethoxazole-trimethoprim], Ciprofloxacin, Etodolac, Flagyl [metronidazole], Naprosyn [naproxen], and Prednisone    Family History:       Problem Relation Age of Onset    Heart Disease Mother     Cancer Mother         uterine?     Mental Illness Father     Cancer Father         prostate    Stroke Father     Schizophrenia Father     Cancer Sister         skin    Bipolar Disorder Sister     Depression Son         PTSD    OCD Son          Health Maintenance Completed:  Health Maintenance   Topic Date Due    Low dose CT lung screening  Never done   [employer] Visit (AWV)  Never done    Breast cancer screen  10/19/2021    TSH testing  03/16/2022    Lipid screen  07/15/2025    Colon cancer screen colonoscopy  04/18/2026    DTaP/Tdap/Td vaccine (3 - Td) 06/04/2029    DEXA (modify frequency per FRAX score)  Completed    Flu vaccine  Completed    Shingles Vaccine  Completed    Pneumococcal 65+ years Vaccine  Completed    COVID-19 Vaccine  Completed    Hepatitis C screen  Completed    Hepatitis A vaccine  Aged Out    Hepatitis B vaccine  Aged Out    Hib vaccine  Aged Out    Meningococcal (ACWY) vaccine  Aged [employer] History   Administered Date(s) Administered    Influenza 10/21/2010    Influenza A (O2V8-21) Vaccine PF IM 10/30/2009    Influenza Vaccine, unspecified formulation 09/14/2014    Influenza Virus Vaccine 09/30/2013, 09/01/2015    Influenza, High Dose (Fluzone 65 yrs and older) 10/03/2017, 08/17/2018    Influenza, Quadv, IM, PF (6 mo and older Fluzone, Flulaval, Fluarix, and 3 yrs and older Afluria) 09/13/2016    Influenza, Quadv, adjuvanted, 65 yrs +, IM, PF (Fluad) 11/04/2020    Influenza, Triv, inactivated, subunit, adjuvanted, IM (Fluad 65 yrs and older) 09/06/2019    Pneumococcal Conjugate 13-valent ([id number]) 09/01/2015    Pneumococcal Polysaccharide ([id number]) 04/07/2015, 11/23/2020    Tdap (Boostrix, Adacel) 04/06/2009, 06/04/2019    Zoster Live (Zostavax) 08/13/2013    Zoster Recombinant (Shingrix) 08/17/2018, 02/18/2019, 03/18/2019, 05/24/2019         Review of Systems:  Review of Systems   Constitutional: Negative for chills and fever. HENT: Positive for congestion and postnasal drip. Negative for ear discharge, ear pain, facial swelling, hearing loss, sinus pressure, sinus pain, sneezing, sore throat, tinnitus, trouble swallowing and voice change. Respiratory: Negative for cough and shortness of breath. Cardiovascular: Negative for chest pain and palpitations. Gastrointestinal: Negative for nausea and vomiting. Neurological: Negative for dizziness and weakness. Physical Exam:   Vitals:    04/21/21 1314   BP: 131/89   Pulse: 86   Temp: 97.6 °F (36.4 °C)   TempSrc: Temporal   SpO2: 97%   Weight: 206 lb 6.4 oz (93.6 kg)     Body mass index is 35.54 kg/m². Wt Readings from Last 3 Encounters:   04/21/21 206 lb 6.4 oz (93.6 kg)   03/16/21 215 lb 9.6 oz (97.8 kg)   11/23/20 211 lb (95.7 kg)       BP Readings from Last 3 Encounters:   04/21/21 131/89   03/16/21 135/85   11/23/20 122/66       Physical Exam  Constitutional:       Appearance: Normal appearance. Cardiovascular:      Rate and Rhythm: Normal rate and regular rhythm. Pulmonary:      Effort: Pulmonary effort is normal.      Breath sounds: Normal breath sounds. Neurological:      General: No focal deficit present. Mental Status: She is alert and oriented to person, place, and time. Assessment/Plan:  Lashell Castro was seen today for sinus problem, urinary tract infection and other. Diagnoses and all orders for this visit:    Seasonal allergies    Chronic bilateral low back pain without sciatica  -     POCT Urinalysis no Micro    Allergic rhinitis, unspecified seasonality, unspecified trigger  -     montelukast (SINGULAIR) 10 MG tablet; Take 1 tablet by mouth nightly    Other orders  -     cetirizine (ZYRTEC) 10 MG tablet;  Take 1 tablet by mouth nightly  -     fluticasone (FLONASE) 50 MCG/ACT nasal spray; 2 sprays by Each Nostril route daily        Health Maintenance Due:  Health Maintenance Due   Topic Date Due    Low dose CT lung screening  Never done    Annual Wellness Visit (AWV)  Never done      53-year-old  female, non-smoker with multiple days including GERD, hypothyroidism, vaginal atrophy, lumbosacral spondylolysis, DDD of the lumbar spine, hyperlipidemia, and and anxiety, ex-smoker, obesity, presents today for the following:    Seasonal allergies, poorly controlled. Prescription for cetirizine 10 mg nightly, montelukast 10 mg every morning, fluticasone 2 sprays each nostril every morning, also encouraged the importance of decreasing allergens, recommend [place] pot, recommend having household checked for mold and any mold abatement if needed. Return precautions discussed. Low back pain. \"I want to pee in a cup\". UA showed small leuk esterase but otherwise within normal limits, no current indication for antibiotic treatment. Return precautions discussed. Recommend adequate hydration. Recommend weight loss, PT, core strengthening, and Tylenol 2 tabs twice daily as needed for pain. Return precautions discussed. Hyperlipidemia, ASCVD risk 9.2%. Recommend statin therapy. Patient declined secondary to \"muscle cramping\". Counseled her on the risks associated with poorly controlled hyperlipidemia including MI, stroke, and death. Return to us clinic as scheduled with PCP for chronic medical care. Return in about 3 months (around 7/21/2021) for As scheduled for CC with Dr. Sophia Cordero. MA Note Attestation: I have reviewed the chief complaint and history of present illness (including ROS and PFSH) and vital documentation by my staff and I agree with their documentation and have added where applicable. EMR Dragon/transcription disclaimer:  Much of this encounter note is electronic transcription/translation of spoken language to printed texts.   The electronic translation of spoken language may be erroneous, or at times, nonsensical

## 2021-04-21 NOTE — PATIENT INSTRUCTIONS
worse.     · You need help controlling your allergies.     · You have questions about allergy testing.     · You do not get better as expected. Where can you learn more? Go to [email or web address] org and sign in to your [employer] account. Enter L249 in the SmartMove box to learn more about \"Managing Your Allergies: Care Instructions. \"     If you do not have an account, please click on the \"Sign Up Now\" link. Current as of: November 6, 2020               Content Version: 12.8  © 3593-3146 Healthwise, Incorporated. Care instructions adapted under license by [facility] ([facility]). If you have questions about a medical condition or this instruction, always ask your healthcare professional. [address] any warranty or liability for your use of this information.

## 2021-05-19 ENCOUNTER — OFFICE VISIT (OUTPATIENT)
Dept: BEHAVIORAL/MENTAL HEALTH CLINIC | Age: 71
End: 2021-05-19
Payer: MEDICARE

## 2021-05-19 DIAGNOSIS — F41.1 GENERALIZED ANXIETY DISORDER WITH PANIC ATTACKS: Primary | ICD-10-CM

## 2021-05-19 DIAGNOSIS — F41.0 GENERALIZED ANXIETY DISORDER WITH PANIC ATTACKS: Primary | ICD-10-CM

## 2021-05-19 DIAGNOSIS — F12.90 MARIJUANA USE: ICD-10-CM

## 2021-05-19 PROCEDURE — 99205 OFFICE O/P NEW HI 60 MIN: CPT | Performed by: COUNSELOR

## 2021-05-19 NOTE — PROGRESS NOTES
dressed, overweight and within normal Limits   Affect:  consistent with expectations based upon mood   Attitude: Distant   Mood:   Anxious   Thought Process:  Linear and goal directed   Delusions: no evidence of delusions   Perceptions: No perceptual disturbance   Behavior:   aggitated   Psychomotor: Within normal limits   Speech: Within normal limits   Eye Contact: good   Orientation:  oriented to person, place, time, and general circumstances   Judgment & Insight:  normal insight and judgment     Risk Assessment:  Current Suicide Risk:  no suicidal ideation, nonsuicidal morbid ideation  Current Homicide Risk:  no homocidal ideation    Jose Richter reported no previous history of suicidal ideation or behavior. Social History/Functioning:  oJse Richter is currently living alone in her apartment and reported little social support (48yo son, Shy Junior; 44yo daugher, Michelle). She denied any current cultural or spiritual concerns.      Social History     Socioeconomic History    Marital status: Single     Spouse name: Not on file    Number of children: 2    Years of education: 15    Highest education level: High school graduate   Occupational History    Occupation: retired   Tobacco Use    Smoking status: Former Smoker     Packs/day: 1.00     Years: 35.00     Pack years: 35.00     Types: Cigarettes     Quit date: 2010     Years since quittin.3    Smokeless tobacco: Never Used   Vaping Use    Vaping Use: Never used   Substance and Sexual Activity    Alcohol use: No     Alcohol/week: 0.0 standard drinks    Drug use: No    Sexual activity: Not Currently     Partners: Male   Other Topics Concern    Not on file   Social History Narrative    Daughter lucille    Son/grand son in [place]    Yoga stretches      Social Determinants of Health     Financial Resource Strain: Low Risk     Difficulty of Paying Living Expenses: Not hard at all   Food Insecurity: No Food Insecurity    Worried About [address] in the Last Year: Never true    Ran Out of Food in the Last Year: Never true   Transportation Needs: No Transportation Needs    Lack of Transportation (Medical): No    Lack of Transportation (Non-Medical): No   Physical Activity: Unknown    Days of Exercise per Week: 1 day    Minutes of Exercise per Session: Not asked   Stress: Stress Concern Present    Feeling of Stress : Very much   Social Connections: Socially Isolated    Frequency of Communication with Friends and Family: Twice a week    Frequency of Social Gatherings with Friends and Family: Never    Attends Jew Services: Never    Active Member of Clubs or Organizations: No    Attends Club or Organization Meetings: Never    Marital Status:    Intimate Partner Violence: Unknown    Fear of Current or Ex-Partner: Patient refused    Emotionally Abused: Patient refused    Physically Abused: Patient refused    Sexually Abused: Patient refused       Past Medical History:   Diagnosis Date    Allergic rhinitis     Anxiety     Asthma     Bipolar 1 disorder ([facility] [address]     questionable dx per patient    Chronic low back pain     [facility]     Decreased bone density 9-9-2008    GERD (gastroesophageal reflux disease)     H/O vulvar dysplasia 1/9/2015    VERONICA 3 wide local excision     Hyperlipidemia     Hypothyroid     Hypothyroidism     Insomnia     Obesity (BMI 30-39.9) 3/16/2021    Osteoarthritis     Urinary incontinence        Diagnosis:   Diagnosis Orders   1. Generalized anxiety disorder with panic attacks     2. Marijuana use         Strengths: Appears psychologically minded   Limitations: Poor motivation for treatment    Patient Response to Plan/Recommendations: Today, we discussed psychoeducation of treatment for anxiety.  Primary goals of therapy were collaboratively identified as decrease symptoms of anxiety, including decreased episodes of panic; create boundaries with son (son is \"overly dependent\" on Lashell Castro); process and identify triggers for anxiety. Discussed confidentiality and limits of confidentiality as it applies to treatment within [facility] and my role as member of the medical treatment team.     Assessment, Impressions and Plan:  Lashell Castro is a typical 73 y.o. old female who presents with severe anxiety symptoms that are not interfering with her school, family and social functioning. Lashell Castro expresses fair insight into her symptoms. Lashell Castro will likely benefit from Cognitive Behavioral therapy to challenge irrational thoughts, Psychoanalysis for insight development, Existential Therapy for acceptance and motivation, and Dialectical Behavioral Therapy to address emotional management. Her symptoms are in the severe range and she will likely need 16-18 therapy sessions. Initial Treatment Plan/Recommendations:  No follow-ups on file. Contact Keisha Frazier, [facility] as needed.       Electronically signed by Keisha Frazier, [facility], Camila, LPCC-S  Licensed Professional Clinical Counselor  Director of [address] and [facility] Medicine Residency Program at [facility]

## 2021-06-02 ENCOUNTER — TELEPHONE (OUTPATIENT)
Dept: PRIMARY CARE CLINIC | Age: 71
End: 2021-06-02

## 2021-06-02 ENCOUNTER — HOSPITAL ENCOUNTER (OUTPATIENT)
Age: 71
Setting detail: SPECIMEN
Discharge: HOME | End: 2021-06-02
Payer: MEDICARE

## 2021-06-02 DIAGNOSIS — R39.89 SUSPECTED UTI: ICD-10-CM

## 2021-06-02 DIAGNOSIS — R39.89 SUSPECTED UTI: Primary | ICD-10-CM

## 2021-06-02 DIAGNOSIS — R82.71 BACTERIURIA: Primary | ICD-10-CM

## 2021-06-02 DIAGNOSIS — R30.0 DYSURIA: ICD-10-CM

## 2021-06-02 LAB
BILIRUBIN URINE: NEGATIVE
BLOOD, URINE: NEGATIVE
CLARITY: ABNORMAL
COLOR: ABNORMAL
EPITHELIAL CELLS, UA: ABNORMAL /HPF (ref 0–5)
GLUCOSE URINE: NEGATIVE MG/DL
KETONES, URINE: NEGATIVE MG/DL
LEUKOCYTE ESTERASE, URINE: ABNORMAL
MICROSCOPIC EXAMINATION: YES
NITRITE, URINE: NEGATIVE
PH UA: 7.5 (ref 5–8)
PROTEIN UA: NEGATIVE MG/DL
RBC UA: ABNORMAL /HPF (ref 0–4)
RENAL EPITHELIAL, UA: ABNORMAL /HPF (ref 0–1)
SPECIFIC GRAVITY UA: 1.01 (ref 1–1.03)
URINE TYPE: ABNORMAL
UROBILINOGEN, URINE: 0.2 E.U./DL
WBC UA: ABNORMAL /HPF (ref 0–5)

## 2021-06-02 PROCEDURE — 81001 URINALYSIS AUTO W/SCOPE: CPT

## 2021-06-02 PROCEDURE — 87086 URINE CULTURE/COLONY COUNT: CPT

## 2021-06-02 RX ORDER — NITROFURANTOIN 25; 75 MG/1; MG/1
100 CAPSULE ORAL 2 TIMES DAILY
Qty: 10 CAPSULE | Refills: 0 | Status: SHIPPED | OUTPATIENT
Start: 2021-06-02 | End: 2021-06-07

## 2021-06-02 NOTE — TELEPHONE ENCOUNTER
----- Message from Naima Robertson sent at 6/2/2021  1:09 PM EDT -----  Subject: Message to Provider    QUESTIONS  Information for Provider? pt thinks she's got the start of a UTI, wants to   get tested and on med to clear  ---------------------------------------------------------------------------  --------------  CALL BACK INFO  What is the best way for the office to contact you? OK to leave message on   voicemail  Preferred Call Back Phone Number? [phone]  ---------------------------------------------------------------------------  --------------  SCRIPT ANSWERS  Relationship to Patient? Self  Appointment reason? Symptomatic  Select script based on patient symptoms? Adult Urinary Symptoms   [Urine-related, UTI, Bladder Infection]   Are you having back pain with your urinary symptoms? No  Are you having vomiting or nausea? No  Are you having fevers (100.4), chills, or sweats? No  Are you having increased thirst? No  Is there blood in your urine? No  Have you been seen by a provider for this symptom before?  Yes

## 2021-06-02 NOTE — TELEPHONE ENCOUNTER
UA/CX ordered. Pt needs Evisit. Can come up for UA/CX    1.  Suspected UTI    - URINALYSIS WITH MICROSCOPIC  - Culture, Urine

## 2021-06-03 ENCOUNTER — TELEPHONE (OUTPATIENT)
Dept: BEHAVIORAL/MENTAL HEALTH CLINIC | Age: 71
End: 2021-06-03

## 2021-06-03 LAB — URINE CULTURE, ROUTINE: NORMAL

## 2021-06-24 ENCOUNTER — TELEPHONE (OUTPATIENT)
Dept: PRIMARY CARE CLINIC | Age: 71
End: 2021-06-24

## 2021-06-24 DIAGNOSIS — K21.9 GASTROESOPHAGEAL REFLUX DISEASE WITHOUT ESOPHAGITIS: ICD-10-CM

## 2021-06-24 DIAGNOSIS — J30.9 ALLERGIC RHINITIS, UNSPECIFIED SEASONALITY, UNSPECIFIED TRIGGER: ICD-10-CM

## 2021-06-24 DIAGNOSIS — N32.81 OAB (OVERACTIVE BLADDER): ICD-10-CM

## 2021-06-24 DIAGNOSIS — E03.9 HYPOTHYROIDISM, UNSPECIFIED TYPE: ICD-10-CM

## 2021-06-24 RX ORDER — PANTOPRAZOLE SODIUM 40 MG/1
40 TABLET, DELAYED RELEASE ORAL
Qty: 90 TABLET | Refills: 0 | Status: SHIPPED | OUTPATIENT
Start: 2021-06-24 | End: 2021-06-30 | Stop reason: SDUPTHER

## 2021-06-24 RX ORDER — MONTELUKAST SODIUM 10 MG/1
10 TABLET ORAL NIGHTLY
Qty: 90 TABLET | Refills: 0 | Status: SHIPPED | OUTPATIENT
Start: 2021-06-24 | End: 2021-06-30 | Stop reason: SDUPTHER

## 2021-06-24 RX ORDER — METHOCARBAMOL 500 MG/1
TABLET, FILM COATED ORAL
Qty: 90 TABLET | Refills: 0 | Status: SHIPPED | OUTPATIENT
Start: 2021-06-24 | End: 2021-06-30 | Stop reason: SDUPTHER

## 2021-06-24 RX ORDER — IBUPROFEN 400 MG/1
TABLET ORAL
Qty: 240 TABLET | Refills: 1 | OUTPATIENT
Start: 2021-06-24

## 2021-06-24 RX ORDER — LEVOTHYROXINE SODIUM 0.1 MG/1
TABLET ORAL
Qty: 90 TABLET | Refills: 0 | Status: SHIPPED | OUTPATIENT
Start: 2021-06-24 | End: 2021-06-30 | Stop reason: SDUPTHER

## 2021-06-24 NOTE — TELEPHONE ENCOUNTER
Patient is calling needing a refill of     montelukast (SINGULAIR) 10 MG tablet  methocarbamol (ROBAXIN) 500 MG tablet  levothyroxine (SYNTHROID) 100 MCG tablet  pantoprazole (PROTONIX) 40 MG tablet    Pharmacy    [facility] [address], [address] [facility] [phone] - F [phone]   [place] [address]   Phone:  [phone]  Fax:  [phone]         Last ov 4/21/2021  Next ov 9/16/2021    Laurie [facility] [phone] (home) [phone] (work)

## 2021-06-29 ENCOUNTER — TELEPHONE (OUTPATIENT)
Dept: PRIMARY CARE CLINIC | Age: 71
End: 2021-06-29

## 2021-06-29 DIAGNOSIS — N39.46 MIXED STRESS AND URGE URINARY INCONTINENCE: ICD-10-CM

## 2021-06-29 DIAGNOSIS — G89.29 CHRONIC MIDLINE THORACIC BACK PAIN: ICD-10-CM

## 2021-06-29 DIAGNOSIS — M47.817 LUMBOSACRAL SPONDYLOSIS WITHOUT MYELOPATHY: Chronic | ICD-10-CM

## 2021-06-29 DIAGNOSIS — M16.0 PRIMARY OSTEOARTHRITIS OF BOTH HIPS: ICD-10-CM

## 2021-06-29 DIAGNOSIS — M54.6 CHRONIC MIDLINE THORACIC BACK PAIN: ICD-10-CM

## 2021-06-29 DIAGNOSIS — M17.10 LOCALIZED OSTEOARTHROSIS, LOWER LEG: ICD-10-CM

## 2021-06-29 DIAGNOSIS — M51.26 DISPLACEMENT OF LUMBAR INTERVERTEBRAL DISC WITHOUT MYELOPATHY: Chronic | ICD-10-CM

## 2021-06-29 DIAGNOSIS — M51.36 DDD (DEGENERATIVE DISC DISEASE), LUMBAR: Primary | ICD-10-CM

## 2021-06-29 NOTE — TELEPHONE ENCOUNTER
Patient is calling all of her medications need to be sent to [facility] Mail order for a 90 day supply and they were called into local pharmacy for a 30 day and her insurance will not cover it.   Please see original encounter attached

## 2021-06-29 NOTE — TELEPHONE ENCOUNTER
Orders placed. Please advise pt    1. DDD (degenerative disc disease), lumbar  2. Displacement of lumbar intervertebral disc without myelopathy  3. Localized osteoarthrosis, lower leg  4. Lumbosacral spondylosis without myelopathy  5. Primary osteoarthritis of both hips  6. Chronic midline thoracic back pain  7. Mixed stress and urge urinary incontinence  - [facility] Physical Therapy  [address], [facility], [address] [place] [address]  [phone]      Please empanel patient to residents for follow up appointment.

## 2021-06-29 NOTE — TELEPHONE ENCOUNTER
----- [facility] at 2021  1:39 PM EDT -----  Subject: Referral Request    QUESTIONS   Reason for referral request? Pt called stating that she had a referral   sent to [facility] rehabilitation. The rehabilitation staff told pt   that the  will have to send over a new referral because the old one    or they can't find it. Pt needs the referral sent over asap,   Has the physician seen you for this condition before? Yes  Select a date? 2021  Select the physician (PCP or Specialist)? Fanta Terrazas   Preferred Specialist (if applicable)? Do you already have an appointment scheduled? No  Additional Information for Provider? Pt would like the referral sent over   asap . pt is stating that she is in pain.  ---------------------------------------------------------------------------  --------------  CALL BACK INFO  What is the best way for the office to contact you? Do not leave any   message, patient will call back for answer  Preferred Call Back Phone Number?  [phone]

## 2021-06-30 RX ORDER — CETIRIZINE HYDROCHLORIDE 10 MG/1
TABLET ORAL
Qty: 90 TABLET | Refills: 0 | Status: SHIPPED | OUTPATIENT
Start: 2021-06-30 | End: 2021-09-30

## 2021-06-30 RX ORDER — METHOCARBAMOL 500 MG/1
TABLET, FILM COATED ORAL
Qty: 90 TABLET | Refills: 0 | Status: SHIPPED | OUTPATIENT
Start: 2021-06-30 | End: 2021-07-30 | Stop reason: SDUPTHER

## 2021-06-30 RX ORDER — PANTOPRAZOLE SODIUM 40 MG/1
40 TABLET, DELAYED RELEASE ORAL
Qty: 90 TABLET | Refills: 0 | Status: SHIPPED | OUTPATIENT
Start: 2021-06-30 | End: 2021-11-01 | Stop reason: SDUPTHER

## 2021-06-30 RX ORDER — LEVOTHYROXINE SODIUM 0.1 MG/1
TABLET ORAL
Qty: 90 TABLET | Refills: 0 | Status: SHIPPED | OUTPATIENT
Start: 2021-06-30 | End: 2021-11-01 | Stop reason: SDUPTHER

## 2021-06-30 RX ORDER — MONTELUKAST SODIUM 10 MG/1
10 TABLET ORAL NIGHTLY
Qty: 90 TABLET | Refills: 0 | Status: SHIPPED | OUTPATIENT
Start: 2021-06-30 | End: 2021-08-17

## 2021-07-01 DIAGNOSIS — G89.29 CHRONIC BILATERAL LOW BACK PAIN WITHOUT SCIATICA: ICD-10-CM

## 2021-07-01 DIAGNOSIS — M54.50 CHRONIC BILATERAL LOW BACK PAIN WITHOUT SCIATICA: ICD-10-CM

## 2021-07-01 RX ORDER — IBUPROFEN 400 MG/1
TABLET ORAL
Qty: 180 TABLET | Refills: 0 | Status: SHIPPED | OUTPATIENT
Start: 2021-07-01 | End: 2021-07-30 | Stop reason: SDUPTHER

## 2021-07-30 ENCOUNTER — OFFICE VISIT (OUTPATIENT)
Dept: PRIMARY CARE CLINIC | Age: 71
End: 2021-07-30
Payer: MEDICARE

## 2021-07-30 VITALS
HEART RATE: 67 BPM | SYSTOLIC BLOOD PRESSURE: 124 MMHG | BODY MASS INDEX: 36.19 KG/M2 | OXYGEN SATURATION: 94 % | DIASTOLIC BLOOD PRESSURE: 62 MMHG | HEIGHT: 64 IN | WEIGHT: 212 LBS | TEMPERATURE: 97 F

## 2021-07-30 DIAGNOSIS — G89.29 CHRONIC MIDLINE THORACIC BACK PAIN: ICD-10-CM

## 2021-07-30 DIAGNOSIS — E78.00 PURE HYPERCHOLESTEROLEMIA: ICD-10-CM

## 2021-07-30 DIAGNOSIS — K21.9 GASTROESOPHAGEAL REFLUX DISEASE WITHOUT ESOPHAGITIS: ICD-10-CM

## 2021-07-30 DIAGNOSIS — M51.36 DDD (DEGENERATIVE DISC DISEASE), LUMBAR: ICD-10-CM

## 2021-07-30 DIAGNOSIS — M47.817 LUMBOSACRAL SPONDYLOSIS WITHOUT MYELOPATHY: Chronic | ICD-10-CM

## 2021-07-30 DIAGNOSIS — G89.29 CHRONIC BILATERAL LOW BACK PAIN WITHOUT SCIATICA: Primary | ICD-10-CM

## 2021-07-30 DIAGNOSIS — M54.6 CHRONIC MIDLINE THORACIC BACK PAIN: ICD-10-CM

## 2021-07-30 DIAGNOSIS — E66.9 OBESITY (BMI 30-39.9): ICD-10-CM

## 2021-07-30 DIAGNOSIS — J45.20 MILD INTERMITTENT ASTHMA WITHOUT COMPLICATION: ICD-10-CM

## 2021-07-30 DIAGNOSIS — N39.46 MIXED STRESS AND URGE URINARY INCONTINENCE: ICD-10-CM

## 2021-07-30 DIAGNOSIS — I83.93 SPIDER VEINS OF BOTH LOWER EXTREMITIES: ICD-10-CM

## 2021-07-30 DIAGNOSIS — M54.50 CHRONIC BILATERAL LOW BACK PAIN WITHOUT SCIATICA: Primary | ICD-10-CM

## 2021-07-30 DIAGNOSIS — E03.9 HYPOTHYROIDISM, UNSPECIFIED TYPE: ICD-10-CM

## 2021-07-30 DIAGNOSIS — Z87.412 H/O VULVAR DYSPLASIA: ICD-10-CM

## 2021-07-30 DIAGNOSIS — N95.2 VAGINAL ATROPHY: ICD-10-CM

## 2021-07-30 PROBLEM — J43.2 CENTRILOBULAR EMPHYSEMA (HCC): Status: RESOLVED | Noted: 2020-03-12 | Resolved: 2021-07-30

## 2021-07-30 PROBLEM — L98.9 EXTERNAL NASAL LESION: Status: RESOLVED | Noted: 2021-03-16 | Resolved: 2021-07-30

## 2021-07-30 PROBLEM — R68.84 JAW PAIN: Status: RESOLVED | Noted: 2020-05-28 | Resolved: 2021-07-30

## 2021-07-30 PROCEDURE — 99214 OFFICE O/P EST MOD 30 MIN: CPT | Performed by: FAMILY MEDICINE

## 2021-07-30 RX ORDER — METHOCARBAMOL 500 MG/1
TABLET, FILM COATED ORAL
Qty: 270 TABLET | Refills: 3 | Status: SHIPPED | OUTPATIENT
Start: 2021-07-30 | End: 2021-11-01 | Stop reason: SDUPTHER

## 2021-07-30 RX ORDER — IBUPROFEN 400 MG/1
TABLET ORAL
Qty: 180 TABLET | Refills: 3 | Status: SHIPPED | OUTPATIENT
Start: 2021-07-30 | End: 2021-11-01 | Stop reason: SDUPTHER

## 2021-07-30 ASSESSMENT — ENCOUNTER SYMPTOMS
COLOR CHANGE: 0
SHORTNESS OF BREATH: 0
EYE PAIN: 0
NAUSEA: 0
WHEEZING: 0
ABDOMINAL PAIN: 0
RHINORRHEA: 0
EYE DISCHARGE: 0
CONSTIPATION: 0
VOMITING: 0
DIARRHEA: 0
BACK PAIN: 1
SORE THROAT: 0
BLOOD IN STOOL: 0

## 2021-07-30 NOTE — PROGRESS NOTES
Eneida Rodriguez  1950    Consultants:   Patient Care Team:  Payal Block. Russell Frank DO as PCP - General (Family Medicine)  Payal Block. Russell Frank DO as PCP - [facility]  RUSLAN Garnica CNP as PCP - Hematology/Oncology (Nurse Practitioner)  Hailee Vargas MD as Obstetrician (Obstetrics & Gynecology)  RUSLAN Garnica CNP as Advanced Practice Nurse (Gynecologic Oncology)    Chief Complaint   Patient presents with    Medication Check    Back Pain    Varicose Veins    Hyperlipidemia    Incontinence    Gastroesophageal Reflux    Asthma    Allergies    Vaginal Dysplasia     HPI:  Eneida Rodriguez is a 73 y.o. female  is an established patient who presents for chronic low back pain, medication check, hyperlipidemia (declines/intolerantstatins,) Urinary incontinence, GERD, Allergic induced Asthma, vulvar dysplasia, estrogen tablets:    Medication check:  Patient blunt on entry to room. .. States she doesn't know why she is here. .. States that she wants her ibuprofen 400 mg and robaxin 500 mg tablets filled for 90 day supplies, otherwise she is out $26.00 monthly. .. She states someone told her she needed 3 month follow up for her medications. .. Spider veins:  Pt complaint of spider/varicose veins on thighs/legs/feet  Inquires if she needs any intervention for these. Handicap Placard:  Pt handicap placard is expiring and asks for renewal for this as well. .. Urinary incontinence: oxybutynin 5 mg BID    Allergic asthma:  Zyrtec 10 mg daily  Flonase 2 sprays each nostril daily  Singulair 10 mg daily  Ventolin albuterol inhaler PRN    GERD:  Protonix 40 mg daily    Hypothyroid:  Synthroid 100 mcg daily  Lab Results   Component Value Date    TSH 2.34 03/16/2021    Z9QCDCB 1.22 08/13/2013    T4FREE 1.6 03/16/2021       Hx of Vulvar Dysplasia:  S/p hysterectomy 2001; was followed by [facility]  Imiquimod cream 5% 3 x a week; not refilled since 2017?   Etradiol vaginal tablet 10 mcg       Patient TABLET EVERY DAY Yes Michael Vidal, DO   pantoprazole (PROTONIX) 40 MG tablet Take 1 tablet by mouth every morning (before breakfast) Yes Michael Vidal, DO   cetirizine (ZYRTEC) 10 MG tablet TAKE 1 TABLET BY MOUTH EVERY NIGHT Yes Philippe Vidal, DO   fluticasone (FLONASE) 50 MCG/ACT nasal spray SHAKE LIQUID AND USE 2 SPRAYS IN EACH NOSTRIL DAILY Yes Philippe Vidal, DO   hydrocortisone 2.5 % cream Place rectally as needed Yes Historical Provider, MD   cetirizine (ZYRTEC) 10 MG tablet Take 10 mg by mouth daily Yes Historical Provider, MD   albuterol sulfate HFA (VENTOLIN HFA) 108 (90 Base) MCG/ACT inhaler Inhale 2 puffs into the lungs every 6 hours as needed for Wheezing Yes Michael Vidal, DO   Handicap Placard MISC by Does not apply route Good for 5  years Yes Michael Vidal, DO   oxybutynin (DITROPAN) 5 MG tablet Take 1 tablet by mouth 2 times daily Yes Qing Almodovar MD   triamcinolone (NASACORT ALLERGY 24HR) 55 MCG/ACT nasal inhaler 2 sprays by Each Nostril route daily Yes Erika Campos MD   ibuprofen (ADVIL;MOTRIN) 400 MG tablet TAKE 1 TO 2 TABLETS BY MOUTH EVERY 8 HOURS AS NEEDED FOR PAIN.  TAKE WITH FOOD AND USE SPARINGLY Yes Erika Camops MD   Omega-3 Fatty Acids (FISH OIL) 1000 MG CAPS Take 3,000 mg by mouth 2 times daily Yes Historical Provider, MD   Coenzyme Q10 200 MG CAPS Take 1 tablet by mouth daily Yes Reg Nicolas MD   Estradiol (VAGIFEM) 10 MCG TABS vaginal tablet  Yes Historical Provider, MD   folic acid (FOLVITE) 686 MCG tablet Take 400 mcg by mouth daily Yes Historical Provider, MD   Zinc 50 MG TABS Take by mouth Yes Historical Provider, MD   Ascorbic Acid (VITAMIN C) 1000 MG tablet Take 1,000 mg by mouth daily Yes Historical Provider, MD   Calcium Carbonate Antacid 1000 MG CHEW Take by mouth Yes Historical Provider, MD   Glucos-MSM-C-Dg-Xclrny-Hxtnrc (BL MSM GLUCOSAMINE COMPLEX PO) Take by mouth Yes Historical Provider, MD   [facility] VITAMIN D3 1000 units CAPS TAKE 1 SOFTGEL BY MOUTH DAILY Yes RUSLAN Rushing CNP   imiquimod (ALDARA) 5 % cream Apply topically three times a week Indications: [facility] ordering Apply topically three times a week. Yes RUSLAN Machado CNP   Psyllium (METAMUCIL) 28.3 % POWD Take 1 each by mouth daily Yes Historical Provider, MD   lactase (LACTAID) 3000 UNITS tablet Take 1 tablet by mouth 3 times daily (with meals) Yes ANJUM Lopez MD   hydrocortisone (ANUSOL-HC) 2.5 % rectal cream Place rectally 2 times daily. Yes RUSLAN Machado CNP   acetaminophen (TYLENOL) 325 MG tablet Take 650 mg by mouth every 6 hours as needed for Pain Yes Historical Provider, MD   fluticasone (FLONASE) 50 MCG/ACT nasal spray 2 sprays by Each Nostril route daily  Danilo Linares MD   tolterodine (DETROL LA) 4 MG ER capsule Take 1 capsule by mouth daily. Tomas Farah MD       Allergies:    Latex, Penicillins, Lipitor [atorvastatin calcium], Nexium [esomeprazole magnesium], Bactrim [sulfamethoxazole-trimethoprim], Ciprofloxacin, Etodolac, Flagyl [metronidazole], Naprosyn [naproxen], and Prednisone    Family History:       Problem Relation Age of Onset    Heart Disease Mother     Cancer Mother         uterine?     Mental Illness Father     Cancer Father         prostate    Stroke Father     Schizophrenia Father     Cancer Sister         skin    Bipolar Disorder Sister     Depression Son         PTSD    OCD Son        Social History:   Social History     Socioeconomic History    Marital status: Single     Spouse name: Not on file    Number of children: 2    Years of education: 15    Highest education level: High school graduate   Occupational History    Occupation: retired   Tobacco Use    Smoking status: Former Smoker     Packs/day: 1.00     Years: 35.00     Pack years: 35.00     Types: Cigarettes     Quit date: 2010     Years since quittin.5    Smokeless tobacco: Never Used   Vaping Use    Vaping Use: Never used Substance and Sexual Activity    Alcohol use: No     Alcohol/week: 0.0 standard drinks    Drug use: No    Sexual activity: Not Currently     Partners: Male   Other Topics Concern    Not on file   Social History Narrative    Daughter Shantanu Castillo    Son/grand son in [place]    Yoga stretches      Social Determinants of Health     Financial Resource Strain: Low Risk     Difficulty of Paying Living Expenses: Not hard at all   Food Insecurity: No Food Insecurity    Worried About Running Out of Food in the Last Year: Never true    Ellis of Food in the Last Year: Never true   Transportation Needs: No Transportation Needs    Lack of Transportation (Medical): No    Lack of Transportation (Non-Medical):  No   Physical Activity: Unknown    Days of Exercise per Week: 1 day    Minutes of Exercise per Session: Not asked   Stress: Stress Concern Present    Feeling of Stress : Very much   Social Connections: Socially Isolated    Frequency of Communication with Friends and Family: Twice a week    Frequency of Social Gatherings with Friends and Family: Never    Attends Scientologist Services: Never    Active Member of Clubs or Organizations: No    Attends Club or Organization Meetings: Never    Marital Status:    Intimate Partner Violence: Unknown    Fear of Current or Ex-Partner: Patient refused    Emotionally Abused: Patient refused    Physically Abused: Patient refused    Sexually Abused: Patient refused       Health Maintenance Completed:  Health Maintenance   Topic Date Due    Low dose CT lung screening  Never done   [employer] Visit (AWV)  Never done    Flu vaccine (1) 09/01/2021    Breast cancer screen  10/19/2021    TSH testing  03/16/2022    Lipid screen  07/15/2025    Colon cancer screen colonoscopy  04/18/2026    DTaP/Tdap/Td vaccine (3 - Td or Tdap) 06/04/2029    DEXA (modify frequency per FRAX score)  Completed    Shingles Vaccine  Completed    Pneumococcal 65+ years Vaccine Completed    COVID-19 Vaccine  Completed    Hepatitis C screen  Completed    Hepatitis A vaccine  Aged Out    Hepatitis B vaccine  Aged Out    Hib vaccine  Aged Out    Meningococcal (ACWY) vaccine  Aged [employer] History   Administered Date(s) Administered    Influenza 10/21/2010    Influenza A ([id number]) Vaccine PF IM 10/30/2009    Influenza Vaccine, unspecified formulation 09/14/2014    Influenza Virus Vaccine 09/30/2013, 09/01/2015    Influenza, High Dose (Fluzone 65 yrs and older) 10/03/2017, 08/17/2018    Influenza, Quadv, IM, PF (6 mo and older Fluzone, Flulaval, Fluarix, and 3 yrs and older Afluria) 09/13/2016    Influenza, Quadv, adjuvanted, 65 yrs +, IM, PF (Fluad) 11/04/2020    Influenza, Triv, inactivated, subunit, adjuvanted, IM (Fluad 65 yrs and older) 09/06/2019    Pneumococcal Conjugate 13-valent ([id number]) 09/01/2015    Pneumococcal Polysaccharide ([id number]) 04/07/2015, 11/23/2020    Tdap (Boostrix, Adacel) 04/06/2009, 06/04/2019    Zoster Live (Zostavax) 08/13/2013    Zoster Recombinant (Shingrix) 08/17/2018, 02/18/2019, 03/18/2019, 05/24/2019       Review of Systems   Constitutional: Negative for chills, fever and unexpected weight change. HENT: Negative for congestion, rhinorrhea and sore throat. Eyes: Negative for pain, discharge and visual disturbance. Respiratory: Negative for shortness of breath and wheezing. Cardiovascular: Negative for chest pain and leg swelling. Gastrointestinal: Negative for abdominal pain, blood in stool, constipation, diarrhea, nausea and vomiting. Endocrine: Negative for polyuria. Genitourinary: Negative for dysuria and flank pain. Musculoskeletal: Positive for arthralgias, back pain and neck pain. Skin: Negative for color change, rash and wound. Spider/varicose veins   Allergic/Immunologic: Positive for environmental allergies (cats). Negative for food allergies.    Neurological: Negative for dizziness, Neurological:      Mental Status: She is alert and oriented to person, place, and time. Cranial Nerves: No cranial nerve deficit. Deep Tendon Reflexes:      Reflex Scores:       Patellar reflexes are 2+ on the right side and 2+ on the left side. Psychiatric:         Mood and Affect: Mood is anxious and depressed. Affect is labile, blunt and flat. Speech: Speech normal.         Behavior: Behavior is agitated and aggressive. Thought Content: Thought content does not include homicidal or suicidal ideation. Judgment: Judgment is impulsive and inappropriate.             Lab Review:  Lab Results   Component Value Date     07/15/2020    K 4.6 07/15/2020    CL 99 07/15/2020    CO2 28 07/15/2020    BUN 22 (H) 07/15/2020    CREATININE 0.7 07/15/2020    GLUCOSE 99 07/15/2020    CALCIUM 9.6 07/15/2020    PROT 6.7 07/15/2020    LABALBU 4.4 07/15/2020    BILITOT <0.2 07/15/2020    ALKPHOS 53 07/15/2020    AST 17 07/15/2020    ALT 14 07/15/2020    LABGLOM >60 07/15/2020    GFRAA >60 07/15/2020    AGRATIO 1.9 07/15/2020    GLOB 2.3 07/15/2020       Lab Results   Component Value Date    CHOL 200 (H) 07/15/2020    CHOL 210 (H) 07/16/2019    CHOL 189 10/04/2017     Lab Results   Component Value Date    TRIG 124 07/15/2020    TRIG 177 (H) 07/16/2019    TRIG 93 10/04/2017     Lab Results   Component Value Date    HDL 61 (H) 07/15/2020    HDL 63 (H) 07/16/2019    HDL 65 10/04/2017     Lab Results   Component Value Date    LDLCHOLESTEROL 46 12/29/2016    LDLCALC 114 (H) 07/15/2020    LDLCALC 112 (H) 07/16/2019    LDLCALC 105 10/04/2017     Lab Results   Component Value Date    LABVLDL 25 07/15/2020    LABVLDL 35 07/16/2019    LABVLDL 15 05/31/2017     Lab Results   Component Value Date    CHOLHDLRATIO 2.9 10/04/2017     The 10-year ASCVD risk score (Shorty Narayanan, et al., 2013) is: 7.8%    Values used to calculate the score:      Age: 73 years      Sex: Female      Is Non- : No      Diabetic: No      Tobacco smoker: No      Systolic Blood Pressure: 592 mmHg      Is BP treated: No      HDL Cholesterol: 61 mg/dL      Total Cholesterol: 200 mg/dL  PT INTOLERANT/REFUSES STATINS    Lab Results   Component Value Date    LABA1C 4.8 07/15/2020     Lab Results   Component Value Date    EAG 91.1 07/15/2020     Lab Results   Component Value Date    WBC 3.9 (L) 07/15/2020    HGB 13.1 07/15/2020    HCT 40.0 07/15/2020    MCV 87.8 07/15/2020     07/15/2020       Assessment/Plan:  Juliann Childs is 72 y/o female who was seen today for medication check, back pain, varicose/spider veins, hyperlipidemia, mixed urinary incontinence, gastroesophageal reflux, asthma, allergies and vaginal dysplasia in past.  Health maintenance up to date pending AWV in months to come. 1. Chronic bilateral low back pain without sciatica  2. Lumbosacral spondylosis without myelopathy  3. DDD (degenerative disc disease), lumbar  4. Chronic midline thoracic back pain  On NSAID's and muscle relaxants currently; will assess further at next OV  - ibuprofen (ADVIL;MOTRIN) 400 MG tablet; 1-2 po tid prn with food  Dispense: 180 tablet; Refill: 3  - methocarbamol (ROBAXIN) 500 MG tablet; TAKE 1 TABLET 3 TIMES A DAY  Dispense: 270 tablet; Refill: 3    5. Spider veins of both lower extremities  -monitor for now; referral to plastic surgery/vascular if persist/worsen    6.  Pure hypercholesterolemia  REFUSES/INTOLERATNT TO STATINS  CONSIDER John Sen  The 10-year ASCVD risk score (Adri Luong, et al., 2013) is: 7.8%    Values used to calculate the score:      Age: 73 years      Sex: Female      Is Non- : No      Diabetic: No      Tobacco smoker: No      Systolic Blood Pressure: 908 mmHg      Is BP treated: No      HDL Cholesterol: 61 mg/dL      Total Cholesterol: 200 mg/dL  Lab Results   Component Value Date    CHOL 200 (H) 07/15/2020    CHOL 210 (H) 07/16/2019    CHOL 189 10/04/2017     Lab Results Component Value Date    TRIG 124 07/15/2020    TRIG 177 (H) 07/16/2019    TRIG 93 10/04/2017     Lab Results   Component Value Date    HDL 61 (H) 07/15/2020    HDL 63 (H) 07/16/2019    HDL 65 10/04/2017     Lab Results   Component Value Date    LDLCHOLESTEROL 46 12/29/2016    LDLCALC 114 (H) 07/15/2020    LDLCALC 112 (H) 07/16/2019    LDLCALC 105 10/04/2017     Lab Results   Component Value Date    LABVLDL 25 07/15/2020    LABVLDL 35 07/16/2019    LABVLDL 15 05/31/2017     Lab Results   Component Value Date    CHOLHDLRATIO 2.9 10/04/2017         7. Obesity (BMI 30-39. 9)  Body mass index is 36.5 kg/m². Wt Readings from Last 3 Encounters:   07/30/21 212 lb (96.2 kg)   04/21/21 206 lb 6.4 oz (93.6 kg)   03/16/21 215 lb 9.6 oz (97.8 kg)   -Recommend 150 minutes of cardiovascular activity a week, or 10,000 to 15,000 steps a day, 2 days of weightbearing  -dietary wise, try to stick to your weight x 10 in calories a day  -avoid processed/refined carbohydrates (boxed/canned/frozen/fast)  -encourage healthy protein and fat, butter, avocado, egg    8. Mixed stress and urge urinary incontinence  Oxybutynin 5 mg BID    9. Mild intermittent asthma without complication  Zyrtec 10 mg daily  Albuterol inhaler PRN  Singulair 10 mg daily  flonase 2 squirt one nsoril daily    10. Gastroesophageal reflux disease without esophagitis  Protonix 40 mg daily    11. Hypothyroidism, unspecified type  Synthroid 100 mcg daily    12. H/O vulvar dysplasia  13. Vaginal atrophy  S/p hysterectomy  Estradiol tablet  10 mcg daily      Health Maintenance Due:  Health Maintenance Due   Topic Date Due    Annual Wellness Visit (AWV)  Never done        Return in about 3 months (around 10/30/2021) for annual medicare well visit, thyroid, back pain, mood, chronic problems.      Spent 30-39 minutes of face to face interaction with patient counseling on diagnoses and treatment plan; including but not limited to pre visit planning, chart/lab review, new orders, instructions, charting    EMR Dragon/transcription disclaimer:  Much of this encounter note is electronic transcription/translation of spoken language to printed texts. The electronic translation of spoken language may be erroneous, or at times, nonsensical words or phrases may be inadvertently transcribed. Although I have reviewed the note for such errors, some may still exist.       Og Nichols.  Nita Cifuentes, DO  7/30/2021

## 2021-08-14 DIAGNOSIS — J30.9 ALLERGIC RHINITIS, UNSPECIFIED SEASONALITY, UNSPECIFIED TRIGGER: ICD-10-CM

## 2021-08-17 RX ORDER — MONTELUKAST SODIUM 10 MG/1
TABLET ORAL
Qty: 90 TABLET | Refills: 1 | Status: SHIPPED | OUTPATIENT
Start: 2021-08-17 | End: 2021-11-01 | Stop reason: SDUPTHER

## 2021-08-30 DIAGNOSIS — N32.81 OAB (OVERACTIVE BLADDER): ICD-10-CM

## 2021-08-31 RX ORDER — OXYBUTYNIN CHLORIDE 5 MG/1
TABLET ORAL
Qty: 180 TABLET | Refills: 2 | Status: SHIPPED | OUTPATIENT
Start: 2021-08-31 | End: 2021-09-01 | Stop reason: SDUPTHER

## 2021-09-01 DIAGNOSIS — N32.81 OAB (OVERACTIVE BLADDER): ICD-10-CM

## 2021-09-01 RX ORDER — OXYBUTYNIN CHLORIDE 5 MG/1
TABLET ORAL
Qty: 180 TABLET | Refills: 2 | Status: SHIPPED | OUTPATIENT
Start: 2021-09-01 | End: 2021-11-01 | Stop reason: SDUPTHER

## 2021-09-02 ENCOUNTER — TELEPHONE (OUTPATIENT)
Dept: PRIMARY CARE CLINIC | Age: 71
End: 2021-09-02

## 2021-09-02 NOTE — TELEPHONE ENCOUNTER
----- Message from Emre Kidd sent at 9/2/2021 12:28 PM EDT -----  Subject: Message to Provider    QUESTIONS  Information for Provider? E[facility] received a call from Pt: Reason? Pt wants to   know if she should fast for her appt on 11/1/2021 at 11am with her PCP. Pt   wants to know if she should get her flu shot at [place] or if she should   wait to schedule a flu shot in the office. ---------------------------------------------------------------------------  --------------  Gene goTenna  What is the best way for the office to contact you? Do not leave any   message, patient will call back for answer, OK to respond with electronic   message via [employer] portal (only for patients who have registered [employer]   account)  Preferred Call Back Phone Number? [phone]  ---------------------------------------------------------------------------  --------------  SCRIPT ANSWERS  Relationship to Patient?  Self

## 2021-09-02 NOTE — TELEPHONE ENCOUNTER
Called and spoke with pt,informed her that she should fast. Also informed pt that she could get the flu shot here in the office and pt stated that she would just go to [facility] and get it done.

## 2021-09-19 DIAGNOSIS — J45.20 MILD INTERMITTENT ASTHMA WITHOUT COMPLICATION: ICD-10-CM

## 2021-09-20 RX ORDER — ALBUTEROL SULFATE 90 UG/1
AEROSOL, METERED RESPIRATORY (INHALATION)
Qty: 54 G | Refills: 1 | Status: SHIPPED | OUTPATIENT
Start: 2021-09-20 | End: 2021-11-01 | Stop reason: SDUPTHER

## 2021-09-22 ENCOUNTER — TELEPHONE (OUTPATIENT)
Dept: PRIMARY CARE CLINIC | Age: 71
End: 2021-09-22

## 2021-09-22 NOTE — TELEPHONE ENCOUNTER
PT returned call. She went to a [facility] and got treatment for her UTI so she no longer needs an appt. PT also states that she got her flu shot at her pharmacy.

## 2021-09-22 NOTE — TELEPHONE ENCOUNTER
----- Message from Mayra Yañez sent at 9/21/2021 10:22 AM EDT -----  Subject: Appointment Request    Reason for Call: Urgent Adult Urinary Problem    QUESTIONS  Type of Appointment? Established Patient  Reason for appointment request? No appointments available during search  Additional Information for Provider? Patient states she is having urinary   frequency, cannot hold bladder well, and a little pain when urinating. Writers system prompted for urgent appointment today or tomorrow   (09/21-09/22), unable to see any available appts. Writer spoke with Diana Victor   who advised that there is no availability until Friday or for pt to go to   urgent care. Writer relayed information to pt. Pt denied wanting to go to   urgent care and would like to know if she can get in tomorrow (09/22) if   possible.   ---------------------------------------------------------------------------  --------------  [address]  What is the best way for the office to contact you? Do not leave any   message, patient will call back for answer  Preferred Call Back Phone Number? [phone]  ---------------------------------------------------------------------------  --------------  SCRIPT ANSWERS  Relationship to Patient? Self  Are you having severe back pain with your urinary symptoms? No  Are you having vomiting or nausea? No  Is there blood in your urine? No  Are you having fevers (100.4), chills, or sweats? No  Have you recently (14 days) seen a provider for this issue? No  Have you been diagnosed with, awaiting test results for, or told that you   are suspected of having COVID-19 (Coronavirus)? (If patient has tested   negative or was tested as a requirement for work, school, or travel and   not based on symptoms, answer no)? No  Within the past two weeks have you developed any of the following symptoms   (answer no if symptoms have been present longer than 2 weeks or began   more than 2 weeks ago)?  Fever or Chills, Cough, Shortness of breath or   difficulty breathing, Loss of taste or smell, Sore throat, Nasal   congestion, Sneezing or runny nose, Fatigue or generalized body aches   (answer no if pain is specific to a body part e.g. back pain), Diarrhea,   Headache? No  Have you had close contact with someone with COVID-19 in the last 14 days? No  (Service Expert  click yes below to proceed with [employer] As Usual   Scheduling)?  Yes

## 2021-09-30 RX ORDER — CETIRIZINE HYDROCHLORIDE 10 MG/1
TABLET ORAL
Qty: 90 TABLET | Refills: 0 | Status: SHIPPED | OUTPATIENT
Start: 2021-09-30 | End: 2021-11-01 | Stop reason: SDUPTHER

## 2021-09-30 RX ORDER — FLUTICASONE PROPIONATE 50 MCG
SPRAY, SUSPENSION (ML) NASAL
Qty: 48 G | Refills: 1 | Status: SHIPPED | OUTPATIENT
Start: 2021-09-30 | End: 2021-11-01 | Stop reason: SDUPTHER

## 2021-09-30 NOTE — TELEPHONE ENCOUNTER
----- Message from [place] sent at 9/30/2021 10:44 AM EDT -----  Subject: Refill Request    QUESTIONS  Name of Medication? cetirizine (ZYRTEC) 10 MG tablet  Patient-reported dosage and instructions? 10 MG tablet   How many days do you have left? 4  [id number]  Pharmacy phone number (if available)? [phone]  ---------------------------------------------------------------------------  --------------,  Name of Medication? fluticasone (FLONASE) 50 MCG/ACT nasal spray  Patient-reported dosage and instructions? 50 MCG/ACT nasal spray   How many days do you have left? 0  Preferred Pharmacy? [facility] phone number (if available)? [phone]  ---------------------------------------------------------------------------  --------------  [place] INFO  What is the best way for the office to contact you? OK to leave message on   voicemail,Do not leave any message, patient will call back for answer  Preferred Call Back Phone Number?  [phone]

## 2021-10-20 ENCOUNTER — HOSPITAL ENCOUNTER (OUTPATIENT)
Dept: WOMENS IMAGING | Age: 71
Discharge: HOME | End: 2021-10-20
Payer: MEDICARE

## 2021-10-20 DIAGNOSIS — Z12.31 ENCOUNTER FOR SCREENING MAMMOGRAM FOR BREAST CANCER: ICD-10-CM

## 2021-10-20 PROCEDURE — 77063 BREAST TOMOSYNTHESIS BI: CPT

## 2021-11-01 ENCOUNTER — HOSPITAL ENCOUNTER (OUTPATIENT)
Age: 71
Discharge: HOME | End: 2021-11-01
Payer: MEDICARE

## 2021-11-01 ENCOUNTER — HOSPITAL ENCOUNTER (OUTPATIENT)
Age: 71
Setting detail: SPECIMEN
Discharge: HOME | End: 2021-11-01
Payer: MEDICARE

## 2021-11-01 ENCOUNTER — OFFICE VISIT (OUTPATIENT)
Dept: PRIMARY CARE CLINIC | Age: 71
End: 2021-11-01
Payer: MEDICARE

## 2021-11-01 VITALS
BODY MASS INDEX: 37.69 KG/M2 | TEMPERATURE: 97.1 F | HEIGHT: 64 IN | WEIGHT: 220.8 LBS | DIASTOLIC BLOOD PRESSURE: 72 MMHG | SYSTOLIC BLOOD PRESSURE: 130 MMHG | HEART RATE: 69 BPM | OXYGEN SATURATION: 97 %

## 2021-11-01 DIAGNOSIS — Z13.220 LIPID SCREENING: ICD-10-CM

## 2021-11-01 DIAGNOSIS — M47.817 LUMBOSACRAL SPONDYLOSIS WITHOUT MYELOPATHY: Chronic | ICD-10-CM

## 2021-11-01 DIAGNOSIS — R82.71 BACTERIURIA: ICD-10-CM

## 2021-11-01 DIAGNOSIS — Z00.00 ROUTINE GENERAL MEDICAL EXAMINATION AT A HEALTH CARE FACILITY: Primary | ICD-10-CM

## 2021-11-01 DIAGNOSIS — M54.50 CHRONIC BILATERAL LOW BACK PAIN WITHOUT SCIATICA: ICD-10-CM

## 2021-11-01 DIAGNOSIS — J45.20 MILD INTERMITTENT ASTHMA WITHOUT COMPLICATION: ICD-10-CM

## 2021-11-01 DIAGNOSIS — N32.81 OAB (OVERACTIVE BLADDER): ICD-10-CM

## 2021-11-01 DIAGNOSIS — E03.9 HYPOTHYROIDISM, UNSPECIFIED TYPE: ICD-10-CM

## 2021-11-01 DIAGNOSIS — M51.36 DDD (DEGENERATIVE DISC DISEASE), LUMBAR: ICD-10-CM

## 2021-11-01 DIAGNOSIS — G89.29 CHRONIC BILATERAL LOW BACK PAIN WITHOUT SCIATICA: ICD-10-CM

## 2021-11-01 DIAGNOSIS — J30.9 ALLERGIC RHINITIS, UNSPECIFIED SEASONALITY, UNSPECIFIED TRIGGER: ICD-10-CM

## 2021-11-01 DIAGNOSIS — M54.6 CHRONIC MIDLINE THORACIC BACK PAIN: ICD-10-CM

## 2021-11-01 DIAGNOSIS — Z01.00 ENCOUNTER FOR VISION SCREENING: ICD-10-CM

## 2021-11-01 DIAGNOSIS — K21.9 GASTROESOPHAGEAL REFLUX DISEASE WITHOUT ESOPHAGITIS: ICD-10-CM

## 2021-11-01 DIAGNOSIS — G89.29 CHRONIC MIDLINE THORACIC BACK PAIN: ICD-10-CM

## 2021-11-01 LAB
A/G RATIO: 1.9 (ref 1.1–2.2)
ALBUMIN SERPL-MCNC: 4.3 G/DL (ref 3.4–5)
ALP BLD-CCNC: 72 U/L (ref 40–129)
ALT SERPL-CCNC: 16 U/L (ref 10–40)
ANION GAP SERPL CALCULATED.3IONS-SCNC: 11 MMOL/L (ref 3–16)
AST SERPL-CCNC: 19 U/L (ref 15–37)
BACTERIA: ABNORMAL /HPF
BILIRUB SERPL-MCNC: <0.2 MG/DL (ref 0–1)
BILIRUBIN URINE: NEGATIVE
BLOOD, URINE: NEGATIVE
BUN BLDV-MCNC: 14 MG/DL (ref 7–20)
CALCIUM SERPL-MCNC: 9.3 MG/DL (ref 8.3–10.6)
CHLORIDE BLD-SCNC: 102 MMOL/L (ref 99–110)
CHOLESTEROL, TOTAL: 206 MG/DL (ref 0–199)
CLARITY: ABNORMAL
CO2: 26 MMOL/L (ref 21–32)
COLOR: ABNORMAL
CREAT SERPL-MCNC: 0.8 MG/DL (ref 0.6–1.2)
EPITHELIAL CELLS, UA: ABNORMAL /HPF (ref 0–5)
GFR AFRICAN AMERICAN: >60
GFR NON-AFRICAN AMERICAN: >60
GLUCOSE BLD-MCNC: 98 MG/DL (ref 70–99)
GLUCOSE URINE: NEGATIVE MG/DL
HDLC SERPL-MCNC: 60 MG/DL (ref 40–60)
KETONES, URINE: NEGATIVE MG/DL
LDL CHOLESTEROL CALCULATED: 122 MG/DL
LEUKOCYTE ESTERASE, URINE: ABNORMAL
MICROSCOPIC EXAMINATION: YES
NITRITE, URINE: NEGATIVE
PH UA: 5.5 (ref 5–8)
POTASSIUM SERPL-SCNC: 4.6 MMOL/L (ref 3.5–5.1)
PROTEIN UA: NEGATIVE MG/DL
RBC UA: ABNORMAL /HPF (ref 0–4)
SODIUM BLD-SCNC: 139 MMOL/L (ref 136–145)
SPECIFIC GRAVITY UA: 1.02 (ref 1–1.03)
T4 FREE: 1.3 NG/DL (ref 0.9–1.8)
TOTAL PROTEIN: 6.6 G/DL (ref 6.4–8.2)
TRIGL SERPL-MCNC: 122 MG/DL (ref 0–150)
TSH SERPL DL<=0.05 MIU/L-ACNC: 2.26 UIU/ML (ref 0.27–4.2)
URINE REFLEX TO CULTURE: YES
URINE TYPE: ABNORMAL
UROBILINOGEN, URINE: 0.2 E.U./DL
VLDLC SERPL CALC-MCNC: 24 MG/DL
WBC UA: ABNORMAL /HPF (ref 0–5)

## 2021-11-01 PROCEDURE — 84443 ASSAY THYROID STIM HORMONE: CPT

## 2021-11-01 PROCEDURE — 87077 CULTURE AEROBIC IDENTIFY: CPT

## 2021-11-01 PROCEDURE — 36415 COLL VENOUS BLD VENIPUNCTURE: CPT

## 2021-11-01 PROCEDURE — 81001 URINALYSIS AUTO W/SCOPE: CPT

## 2021-11-01 PROCEDURE — 80061 LIPID PANEL: CPT

## 2021-11-01 PROCEDURE — 84439 ASSAY OF FREE THYROXINE: CPT

## 2021-11-01 PROCEDURE — 87186 SC STD MICRODIL/AGAR DIL: CPT

## 2021-11-01 PROCEDURE — 80053 COMPREHEN METABOLIC PANEL: CPT

## 2021-11-01 PROCEDURE — G0439 PPPS, SUBSEQ VISIT: HCPCS | Performed by: FAMILY MEDICINE

## 2021-11-01 PROCEDURE — 87086 URINE CULTURE/COLONY COUNT: CPT

## 2021-11-01 RX ORDER — CETIRIZINE HYDROCHLORIDE 10 MG/1
TABLET ORAL
Qty: 90 TABLET | Refills: 0 | Status: SHIPPED | OUTPATIENT
Start: 2021-11-01 | End: 2022-03-29

## 2021-11-01 RX ORDER — LEVOTHYROXINE SODIUM 0.1 MG/1
TABLET ORAL
Qty: 90 TABLET | Refills: 0 | Status: SHIPPED | OUTPATIENT
Start: 2021-11-01 | End: 2022-01-14

## 2021-11-01 RX ORDER — PANTOPRAZOLE SODIUM 40 MG/1
40 TABLET, DELAYED RELEASE ORAL
Qty: 90 TABLET | Refills: 0 | Status: SHIPPED | OUTPATIENT
Start: 2021-11-01 | End: 2022-01-24

## 2021-11-01 RX ORDER — MONTELUKAST SODIUM 10 MG/1
TABLET ORAL
Qty: 90 TABLET | Refills: 1 | Status: SHIPPED | OUTPATIENT
Start: 2021-11-01 | End: 2022-04-08 | Stop reason: SDUPTHER

## 2021-11-01 RX ORDER — METHOCARBAMOL 500 MG/1
TABLET, FILM COATED ORAL
Qty: 270 TABLET | Refills: 3 | Status: SHIPPED | OUTPATIENT
Start: 2021-11-01 | End: 2021-11-29 | Stop reason: SDUPTHER

## 2021-11-01 RX ORDER — ALBUTEROL SULFATE 90 UG/1
AEROSOL, METERED RESPIRATORY (INHALATION)
Qty: 54 G | Refills: 1 | Status: SHIPPED | OUTPATIENT
Start: 2021-11-01 | End: 2022-08-26

## 2021-11-01 RX ORDER — OXYBUTYNIN CHLORIDE 5 MG/1
TABLET ORAL
Qty: 180 TABLET | Refills: 2 | Status: SHIPPED | OUTPATIENT
Start: 2021-11-01 | End: 2022-10-07 | Stop reason: SDUPTHER

## 2021-11-01 RX ORDER — FLUTICASONE PROPIONATE 50 MCG
SPRAY, SUSPENSION (ML) NASAL
Qty: 48 G | Refills: 1 | Status: SHIPPED | OUTPATIENT
Start: 2021-11-01 | End: 2022-04-08 | Stop reason: SDUPTHER

## 2021-11-01 RX ORDER — IBUPROFEN 400 MG/1
TABLET ORAL
Qty: 180 TABLET | Refills: 3 | Status: SHIPPED | OUTPATIENT
Start: 2021-11-01 | End: 2022-04-08 | Stop reason: SDUPTHER

## 2021-11-01 ASSESSMENT — PATIENT HEALTH QUESTIONNAIRE - PHQ9
SUM OF ALL RESPONSES TO PHQ QUESTIONS 1-9: 0
SUM OF ALL RESPONSES TO PHQ QUESTIONS 1-9: 0
1. LITTLE INTEREST OR PLEASURE IN DOING THINGS: 0
SUM OF ALL RESPONSES TO PHQ QUESTIONS 1-9: 0
SUM OF ALL RESPONSES TO PHQ9 QUESTIONS 1 & 2: 0
2. FEELING DOWN, DEPRESSED OR HOPELESS: 0

## 2021-11-01 ASSESSMENT — LIFESTYLE VARIABLES: HOW OFTEN DO YOU HAVE A DRINK CONTAINING ALCOHOL: 0

## 2021-11-01 NOTE — PROGRESS NOTES
Medicare Annual Wellness Visit    Name: Esteban Jacob Date: 2021   MRN: [id number] Sex: Female   Age: 73 y.o. Ethnicity: Non- / Non    : 1950 Race: White (non-)      Chief Complaint   Patient presents with    Medicare AW    3 Month Follow-Up    Cystitis    Hypothyroidism    Asthma    Back Pain    Gastroesophageal Reflux     HPI:    Rudy Lerner is here for Medicare AWV, 3 Month Follow-Up, Cystitis, Hypothyroidism, Asthma, Back Pain, and Gastroesophageal Reflux    -First concern of patient: no UTI symptoms; asks abou fluid intake; was told to drink no more 72 oz a day; wants to know adequate intake    -Second concern of patient: left sternal chest lesion; present for a few months; just noticed; no change in size; not sure if picked at it or other concern    -Third concern: dried skin on toes and wrist; dries and cracks; puts lotion on it; helps somewhat    -Health maintenance:    -Flu shot and COVID 19 up to date    Screenings for behavioral, psychosocial and functional/safety risks, and cognitive dysfunction are all negative except as indicated below. These results, as well as other patient data from the [address] form, are documented in Flowsheets linked to this Encounter. Allergies   Allergen Reactions    Latex Rash    Penicillins Shortness Of Breath    Lipitor [Atorvastatin Calcium] Other (See Comments)     Muscle cramps    Nexium [Esomeprazole Magnesium] Other (See Comments)     Cramps, including feet, legs, and sides of abdomen    Bactrim [Sulfamethoxazole-Trimethoprim] Other (See Comments)     Dry mouth    Ciprofloxacin Rash     Rash, headache    Etodolac Rash    Flagyl [Metronidazole] Rash    Naprosyn [Naproxen] Nausea And Vomiting     GI upset    Prednisone Other (See Comments)     Headache, increased BP       Prior to Visit Medications    Medication Sig Taking?  Authorizing Provider   cetirizine (ZYRTEC) 10 MG tablet TAKE 1 TABLET BY MOUTH EVERY NIGHT  Analy Johnson., DO   fluticasone (FLONASE) 50 MCG/ACT nasal spray SHAKE LIQUID AND USE 2 SPRAYS IN EACH NOSTRIL DAILY  Carlos Johnson., DO   albuterol sulfate  (90 Base) MCG/ACT inhaler USE 2 INHALATIONS ORALLY   EVERY 6 HOURS AS NEEDED Remy Johnson., DO   oxybutynin (DITROPAN) 5 MG tablet TAKE 1 TABLET TWICE A DAY  Analy Johnson., DO   montelukast (SINGULAIR) 10 MG tablet TAKE 1 TABLET NIGHTLY  Carlos Johnson., DO   ibuprofen (ADVIL;MOTRIN) 400 MG tablet 1-2 po tid prn with food  Maciel Johnson., DO   methocarbamol (ROBAXIN) 500 MG tablet TAKE 1 TABLET 3 TIMES A DAY  Carlos Johnson., DO   Handicap [place] MISC by Does not apply route Good for 5  Years expires 7/30/2026  Maciel Johnson., DO   levothyroxine (SYNTHROID) 100 MCG tablet TAKE 1 TABLET EVERY DAY  Carlos Johnson., DO   pantoprazole (PROTONIX) 40 MG tablet Take 1 tablet by mouth every morning (before breakfast)  Maciel Johnson., DO   fluticasone [facility]) 50 MCG/ACT nasal spray 2 sprays by Each Nostril route daily  Avelina Quiroz MD   hydrocortisone 2.5 % cream Place rectally as needed  Historical Provider, MD   cetirizine (ZYRTEC) 10 MG tablet Take 10 mg by mouth daily  Historical Provider, MD   Handicap Placard MISC by Does not apply route Good for 5  years  Analy Johnson., DO   Omega-3 Fatty Acids (FISH OIL) 1000 MG CAPS Take 3,000 mg by mouth 2 times daily  Historical Provider, MD   Coenzyme Q10 200 MG CAPS Take 1 tablet by mouth daily  Qiana Hernandez MD   Estradiol (VAGIFEM) 10 MCG TABS vaginal tablet   Historical Provider, MD   folic acid (FOLVITE) 352 MCG tablet Take 400 mcg by mouth daily  Historical Provider, MD   Zinc 50 MG TABS Take by mouth  Historical Provider, MD   Ascorbic Acid (VITAMIN C) 1000 MG tablet Take 1,000 mg by mouth daily  Historical Provider, MD   Calcium Carbonate Antacid 1000 MG CHEW Take by mouth  Historical Provider, MD [id number] (BL MSM GLUCOSAMINE COMPLEX PO) Take by mouth  Historical Provider, MD   [employer] VITAMIN D3 1000 units CAPS TAKE 1 SOFTGEL BY MOUTH DAILY  RUSLAN Ramsey CNP   imiquimod (ALDARA) 5 % cream Apply topically three times a week Indications: [facility] ordering Apply topically three times a week. Raina [place], APRN - CNP   Psyllium (METAMUCIL) 28.3 % POWD Take 1 each by mouth daily  Historical Provider, MD   lactase (LACTAID) 3000 UNITS tablet Take 1 tablet by mouth 3 times daily (with meals)  ANJUM Salgado MD   hydrocortisone (ANUSOL-HC) 2.5 % rectal cream Place rectally 2 times daily. Raina Noman, APRN - CNP   acetaminophen (TYLENOL) 325 MG tablet Take 650 mg by mouth every 6 hours as needed for Pain  Historical Provider, MD   tolterodine (DETROL LA) 4 MG ER capsule Take 1 capsule by mouth daily. Chloe Gagnon MD       Past Medical History:   Diagnosis Date    Allergic rhinitis     Anxiety     Asthma     Bipolar 1 disorder ([address]     questionable dx per patient    Chronic low back pain     COPD (chronic obstructive pulmonary disease) [address] [address]     Decreased bone density 9-9-2008    First degree hemorrhoids 9/1/2016    Former smoker 6/13/2013    GERD (gastroesophageal reflux disease)     H/O vulvar dysplasia 1/9/2015    VERONICA 3 wide local excision     Hyperlipidemia     Hypothyroid     Hypothyroidism     Insomnia     Obesity (BMI 30-39.9) 3/16/2021    Osteoarthritis     Urinary incontinence        Past Surgical History:   Procedure Laterality Date    COLONOSCOPY  18 Apr 2016    Diverticulosis, hemorrhoid    HYSTERECTOMY  2001    Partial ? Endometriosis - Benign    TUBAL LIGATION  1981       Family History   Problem Relation Age of Onset    Heart Disease Mother     Cancer Mother         uterine?     Mental Illness Father     Cancer Father         prostate    Stroke Father     Schizophrenia Father     Cancer Sister         skin    Bipolar Disorder Sister  Depression Son         PTSD    OCD Son        Lata Redding (Including outside providers/suppliers regularly involved in providing care):   Patient Care Team:  Jordana Guillory. Amador Hernandez DO as PCP - General (Family Medicine)  Jordana Guillory. Amador Hernandez DO as PCP [facility]  RUSLAN Walters CNP as PCP - Hematology/Oncology (Nurse Practitioner)  Yomi Vallecillo MD as Obstetrician (Obstetrics & Gynecology)  RUSLAN Walters CNP as Advanced Practice Nurse (Gynecologic Oncology)    Wt Readings from Last 3 Encounters:   11/01/21 220 lb 12.8 oz (100.2 kg)   07/30/21 212 lb (96.2 kg)   04/21/21 206 lb 6.4 oz (93.6 kg)     Vitals:    11/01/21 1100   BP: 130/72   Pulse: 69   Temp: 97.1 °F (36.2 °C)   TempSrc: Temporal   SpO2: 97%   Weight: 220 lb 12.8 oz (100.2 kg)   Height: 5' 3.5\" (1.613 m)     Body mass index is 38.5 kg/m². Based upon direct observation of the patient, evaluation of cognition reveals recent and remote memory intact.   3 word recall intact  Clock drawing; #s correct, time incorrect  Hour hand on 11, number hand also on 11    General Appearance: alert and oriented to person, place and time, well developed and well- nourished, in no acute distress  Skin: warm and dry, no rash or erythema  Head: normocephalic and atraumatic  Eyes: pupils equal, round, and reactive to light, extraocular eye movements intact, conjunctivae normal  ENT: tympanic membrane, external ear and ear canal normal bilaterally, nose without deformity, nasal mucosa and turbinates normal without polyps  Neck: supple and non-tender without mass, no thyromegaly or thyroid nodules, no cervical lymphadenopathy  Pulmonary/Chest: clear to auscultation bilaterally- no wheezes, rales or rhonchi, normal air movement, no respiratory distress  Cardiovascular: normal rate, regular rhythm, normal S1 and S2, no murmurs, rubs, clicks, or gallops, distal pulses intact, no carotid bruits  Abdomen: soft, non-tender, non-distended, normal bowel sounds, no masses or organomegaly  Extremities: no cyanosis, clubbing or edema  Musculoskeletal: normal range of motion, no joint swelling, deformity or tenderness  Neurologic: reflexes normal and symmetric, no cranial nerve deficit, gait, coordination and speech normal    Patient's complete Health Risk Assessment and screening values have been reviewed and are found in Flowsheets. The following problems were reviewed today and where indicated follow up appointments were made and/or referrals ordered. Positive Risk Factor Screenings with Interventions:          General Health and ACP:  General  In general, how would you say your health is?: Good  In the past 7 days, have you experienced any of the following?  New or Increased Pain, New or Increased Fatigue, Loneliness, Social Isolation, Stress or Anger?: None of These  Do you get the social and emotional support that you need?: (!) No  Do you have a Living Will?: (!) No  Advance Directives     Power of [facility] Will ACP-Advance Directive ACP-Power of     Not on File Not on File Filed Not on File      General Health Risk Interventions:  · No Living Will: Advance Care Planning addressed with patient today and Patient declines referral todayto ACP Clinical Specialist    Health Habits/Nutrition:  Health Habits/Nutrition  Do you exercise for at least 20 minutes 2-3 times per week?: Yes  Have you lost any weight without trying in the past 3 months?: No  Do you eat only one meal per day?: No  Body mass index: (!) 38.5  Health Habits/Nutrition Interventions:  · Inadequate physical activity:  patient agrees to exercise for at least 150 minutes/week, patient agrees to wear a pedometer and walk at least 10,000 steps/day  · Nutritional issues:  educational materials to promote weight loss provided    Hearing/Vision:  No exam data present  Hearing/Vision  Do you or your family notice any trouble with your hearing that hasn't been managed with hearing aids?: No  Do you have difficulty driving, watching TV, or doing any of your daily activities because of your eyesight?: No  Have you had an eye exam within the past year?: (!) No  Hearing/Vision Interventions:  · Vision concerns:  ophthalmology/optometry referral provided   · Couldn't afford it and declines referral; uses [facility] to [facility]      Personalized Preventive Plan   Current Health Maintenance Status  Immunization History   Administered Date(s) Administered    COVID-19Hilario PF, 30mcg/0.3mL 03/11/2021, 04/02/2021, 10/13/2021    Influenza 10/21/2010    Influenza A ([id number]) Vaccine PF IM 10/30/2009    Influenza Vaccine, unspecified formulation 09/14/2014    Influenza Virus Vaccine 09/30/2013, 09/01/2015, 09/09/2021    Influenza, High Dose (Fluzone 65 yrs and older) 10/03/2017, 08/17/2018    Influenza, Quadv, IM, PF (6 mo and older Fluzone, Flulaval, Fluarix, and 3 yrs and older Afluria) 09/13/2016    Influenza, Quadv, adjuvanted, 65 yrs +, IM, PF (Fluad) 11/04/2020    Influenza, Triv, inactivated, subunit, adjuvanted, IM (Fluad 65 yrs and older) 09/06/2019    Pneumococcal Conjugate 13-valent ([id number]) 09/01/2015    Pneumococcal Polysaccharide ([id number]) 04/07/2015, 11/23/2020    Tdap (Boostrix, Adacel) 04/06/2009, 06/04/2019    Zoster Live (Zostavax) 08/13/2013    Zoster Recombinant (Shingrix) 08/17/2018, 02/18/2019, 03/18/2019, 05/24/2019        Health Maintenance   Topic Date Due    Annual Wellness Visit (AWV)  03/13/2021    TSH testing  03/16/2022    Breast cancer screen  10/20/2022    Lipid screen  07/15/2025    Colon cancer screen colonoscopy  04/18/2026    DTaP/Tdap/Td vaccine (3 - Td or Tdap) 06/04/2029    DEXA (modify frequency per FRAX score)  Completed    Flu vaccine  Completed    Shingles Vaccine  Completed    Pneumococcal 65+ years Vaccine  Completed    COVID-19 Vaccine  Completed    Hepatitis C screen  Completed    Hepatitis A vaccine  Aged Out    Hepatitis B vaccine  Aged Out    Hib vaccine  Aged Out    Meningococcal (ACWY) vaccine  Aged Out    Low dose CT lung screening  Discontinued     A/P:  Rudy Lerner was seen today for medicare AWV, bacteriuria/recurrent UTI/OAB, hypothyroidism, asthma, back pain, rhinitis    1. Routine general medical examination at a health care facility  -Chart/records reviewed, history and physical performed, health maintenance addressed and updated, presenting problems addressed. -declines ACP referral today  -Recommendations for Preventive Services Due: see orders and patient instructions/AVS.  -Recommended screening schedule for the next 5-10 years is provided to the patient in written form: see Patient Instructions/AVS.    2. Encounter for vision screening  - Enma Whitlock MD, Ophthalmology, [facility]    3. OAB (overactive bladder)  4. Bacteriuria   - oxybutynin (DITROPAN) 5 MG tablet; TAKE 1 TABLET TWICE A DAY  Dispense: 180 tablet; Refill: 2  - Comprehensive Metabolic Panel; Future  - URINALYSIS WITH MICROSCOPIC  - Culture, Urine    5. Hypothyroidism, unspecified type  - levothyroxine (SYNTHROID) 100 MCG tablet; TAKE 1 TABLET EVERY DAY  Dispense: 90 tablet; Refill: 0  - TSH without Reflex; Future  - T4, Free; Future    6. Mild intermittent asthma without complication  - albuterol sulfate  (90 Base) MCG/ACT inhaler; USE 2 INHALATIONS ORALLY   EVERY 6 HOURS AS NEEDED FORWHEEZING  Dispense: 54 g; Refill: 1    7. Chronic bilateral low back pain without sciatica  8. Lumbosacral spondylosis without myelopathy  9. DDD (degenerative disc disease), lumbar  - methocarbamol (ROBAXIN) 500 MG tablet; TAKE 1 TABLET 3 TIMES A DAY  Dispense: 270 tablet; Refill: 3  - ibuprofen (ADVIL;MOTRIN) 400 MG tablet; 1-2 po tid prn with food  Dispense: 180 tablet; Refill: 3    10. Chronic midline thoracic back pain  11.  Allergic rhinitis, unspecified seasonality, unspecified trigger  - montelukast (SINGULAIR) 10 MG tablet; TAKE 1 TABLET NIGHTLY  Dispense: 90 tablet; Refill: 1    12. Gastroesophageal reflux disease without esophagitis  - pantoprazole (PROTONIX) 40 MG tablet; Take 1 tablet by mouth every morning (before breakfast)  Dispense: 90 tablet; Refill: 0    13. Lipid screening  - Lipid Panel; Future    Return in 6 months (on 5/1/2022). Yaneth Morrissey.  Brandie Delacruz.,   11/1/2021

## 2021-11-03 DIAGNOSIS — B96.20 E. COLI UTI: Primary | ICD-10-CM

## 2021-11-03 DIAGNOSIS — N39.0 E. COLI UTI: Primary | ICD-10-CM

## 2021-11-03 LAB
ORGANISM: ABNORMAL
URINE CULTURE, ROUTINE: ABNORMAL

## 2021-11-03 RX ORDER — NITROFURANTOIN 25; 75 MG/1; MG/1
100 CAPSULE ORAL 2 TIMES DAILY
Qty: 10 CAPSULE | Refills: 0 | Status: SHIPPED | OUTPATIENT
Start: 2021-11-03 | End: 2021-11-08

## 2021-11-05 ENCOUNTER — TELEPHONE (OUTPATIENT)
Dept: PRIMARY CARE CLINIC | Age: 71
End: 2021-11-05

## 2021-11-05 DIAGNOSIS — N39.0 RECURRENT UTI: Primary | ICD-10-CM

## 2021-11-05 DIAGNOSIS — R31.29 OTHER MICROSCOPIC HEMATURIA: ICD-10-CM

## 2021-11-05 NOTE — TELEPHONE ENCOUNTER
[facility]   [address] [facility],  [address] [place] [address], [address]   Phone: [phone]   Fax: [phone]

## 2021-11-05 NOTE — TELEPHONE ENCOUNTER
----- Message from Fanta Tello sent at 11/5/2021 12:54 PM EDT -----  Subject: Referral Request    QUESTIONS   Reason for referral request? urologistBreonna Aisha Dy   Has the physician seen you for this condition before? Yes  Select a date? 2021-11-01  Select the Provider the patient wants to be referred to, if known (PCP or   Specialist)? Outside Physician - urologistBreonna, [facility]   Preferred Specialist (if applicable)? Do you already have an appointment scheduled? Yes  Select Scheduled Date? 2021-11-23  Select Scheduled Physician? Outside Physician - urologistBreonna Aisha Dy  Additional Information for Provider? urologistBreonna, [address] [place] [address],[address] [facility]   ---------------------------------------------------------------------------  --------------  [employer] INFO  What is the best way for the office to contact you? OK to leave message on   voicemail  Preferred Call Back Phone Number?  [phone]

## 2021-11-24 ENCOUNTER — TELEPHONE (OUTPATIENT)
Dept: PRIMARY CARE CLINIC | Age: 71
End: 2021-11-24

## 2021-11-29 ENCOUNTER — OFFICE VISIT (OUTPATIENT)
Dept: PRIMARY CARE CLINIC | Age: 71
End: 2021-11-29
Payer: MEDICARE

## 2021-11-29 ENCOUNTER — HOSPITAL ENCOUNTER (OUTPATIENT)
Age: 71
Discharge: HOME | End: 2021-11-29
Payer: MEDICARE

## 2021-11-29 ENCOUNTER — TELEPHONE (OUTPATIENT)
Dept: PRIMARY CARE CLINIC | Age: 71
End: 2021-11-29

## 2021-11-29 VITALS
TEMPERATURE: 98.4 F | BODY MASS INDEX: 38.19 KG/M2 | SYSTOLIC BLOOD PRESSURE: 124 MMHG | OXYGEN SATURATION: 97 % | HEART RATE: 80 BPM | DIASTOLIC BLOOD PRESSURE: 72 MMHG | WEIGHT: 219 LBS

## 2021-11-29 DIAGNOSIS — M54.6 CHRONIC MIDLINE THORACIC BACK PAIN: ICD-10-CM

## 2021-11-29 DIAGNOSIS — M54.50 CHRONIC BILATERAL LOW BACK PAIN WITHOUT SCIATICA: ICD-10-CM

## 2021-11-29 DIAGNOSIS — M51.36 DDD (DEGENERATIVE DISC DISEASE), LUMBAR: ICD-10-CM

## 2021-11-29 DIAGNOSIS — Z86.2 HISTORY OF ANEMIA: ICD-10-CM

## 2021-11-29 DIAGNOSIS — G89.29 CHRONIC BILATERAL LOW BACK PAIN WITHOUT SCIATICA: ICD-10-CM

## 2021-11-29 DIAGNOSIS — G89.29 CHRONIC MIDLINE THORACIC BACK PAIN: ICD-10-CM

## 2021-11-29 DIAGNOSIS — M47.817 LUMBOSACRAL SPONDYLOSIS WITHOUT MYELOPATHY: Chronic | ICD-10-CM

## 2021-11-29 DIAGNOSIS — Z01.818 PRE-OP EXAMINATION: Primary | ICD-10-CM

## 2021-11-29 LAB
HCT VFR BLD CALC: 37.5 % (ref 36–48)
HEMOGLOBIN: 12.2 G/DL (ref 12–16)

## 2021-11-29 PROCEDURE — 93000 ELECTROCARDIOGRAM COMPLETE: CPT | Performed by: FAMILY MEDICINE

## 2021-11-29 PROCEDURE — 99214 OFFICE O/P EST MOD 30 MIN: CPT | Performed by: FAMILY MEDICINE

## 2021-11-29 PROCEDURE — 85018 HEMOGLOBIN: CPT

## 2021-11-29 PROCEDURE — 85014 HEMATOCRIT: CPT

## 2021-11-29 PROCEDURE — 36415 COLL VENOUS BLD VENIPUNCTURE: CPT

## 2021-11-29 RX ORDER — METHOCARBAMOL 500 MG/1
TABLET, FILM COATED ORAL
Qty: 270 TABLET | Refills: 3 | Status: SHIPPED | OUTPATIENT
Start: 2021-11-29 | End: 2022-02-02

## 2021-11-29 ASSESSMENT — ANXIETY QUESTIONNAIRES
GAD7 TOTAL SCORE: 2
6. BECOMING EASILY ANNOYED OR IRRITABLE: 0
7. FEELING AFRAID AS IF SOMETHING AWFUL MIGHT HAPPEN: 0
2. NOT BEING ABLE TO STOP OR CONTROL WORRYING: 1
1. FEELING NERVOUS, ANXIOUS, OR ON EDGE: 1
3. WORRYING TOO MUCH ABOUT DIFFERENT THINGS: 0
5. BEING SO RESTLESS THAT IT IS HARD TO SIT STILL: 0
4. TROUBLE RELAXING: 0

## 2021-11-29 ASSESSMENT — PATIENT HEALTH QUESTIONNAIRE - PHQ9
SUM OF ALL RESPONSES TO PHQ QUESTIONS 1-9: 0
1. LITTLE INTEREST OR PLEASURE IN DOING THINGS: 0
SUM OF ALL RESPONSES TO PHQ QUESTIONS 1-9: 0
SUM OF ALL RESPONSES TO PHQ QUESTIONS 1-9: 0
9. THOUGHTS THAT YOU WOULD BE BETTER OFF DEAD, OR OF HURTING YOURSELF: 0
7. TROUBLE CONCENTRATING ON THINGS, SUCH AS READING THE NEWSPAPER OR WATCHING TELEVISION: 0
SUM OF ALL RESPONSES TO PHQ9 QUESTIONS 1 & 2: 0
3. TROUBLE FALLING OR STAYING ASLEEP: 0
5. POOR APPETITE OR OVEREATING: 0
2. FEELING DOWN, DEPRESSED OR HOPELESS: 0
6. FEELING BAD ABOUT YOURSELF - OR THAT YOU ARE A FAILURE OR HAVE LET YOURSELF OR YOUR FAMILY DOWN: 0
4. FEELING TIRED OR HAVING LITTLE ENERGY: 0
8. MOVING OR SPEAKING SO SLOWLY THAT OTHER PEOPLE COULD HAVE NOTICED. OR THE OPPOSITE, BEING SO FIGETY OR RESTLESS THAT YOU HAVE BEEN MOVING AROUND A LOT MORE THAN USUAL: 0

## 2021-11-29 NOTE — PROGRESS NOTES
Preoperative Consultation        [address] [address] [facility] [address]         Phone: [phone]      Blanca Linda  YOB: 1950    Date of Service:  11/29/2021    Vitals:    11/29/21 1451   BP: 124/72   Site: Left Upper Arm   Position: Sitting   Pulse: 80   Temp: 98.4 °F (36.9 °C)   TempSrc: Temporal   SpO2: 97%   Weight: 219 lb (99.3 kg)      Wt Readings from Last 2 Encounters:   11/29/21 219 lb (99.3 kg)   11/01/21 220 lb 12.8 oz (100.2 kg)     BP Readings from Last 3 Encounters:   11/29/21 124/72   11/01/21 130/72   07/30/21 124/62        Chief Complaint   Patient presents with   Verl Raw Pre-op Exam     Bladder surgery 12.13.21     Allergies   Allergen Reactions    Latex Rash    Penicillins Shortness Of Breath    Lipitor [Atorvastatin Calcium] Other (See Comments)     Muscle cramps    Nexium [Esomeprazole Magnesium] Other (See Comments)     Cramps, including feet, legs, and sides of abdomen    Bactrim [Sulfamethoxazole-Trimethoprim] Other (See Comments)     Dry mouth    Ciprofloxacin Rash     Rash, headache    Etodolac Rash    Flagyl [Metronidazole] Rash    Naprosyn [Naproxen] Nausea And Vomiting     GI upset    Prednisone Other (See Comments)     Headache, increased BP     Outpatient Medications Marked as Taking for the 11/29/21 encounter (Office Visit) with Hany Mcdonnell MD   Medication Sig Dispense Refill    albuterol sulfate  (90 Base) MCG/ACT inhaler USE 2 INHALATIONS ORALLY   EVERY 6 HOURS AS NEEDED FORWHEEZING 54 g 1    cetirizine (ZYRTEC) 10 MG tablet TAKE 1 TABLET BY MOUTH EVERY NIGHT 90 tablet 0    fluticasone (FLONASE) 50 MCG/ACT nasal spray SHAKE LIQUID AND USE 2 SPRAYS IN EACH NOSTRIL DAILY 48 g 1    levothyroxine (SYNTHROID) 100 MCG tablet TAKE 1 TABLET EVERY DAY 90 tablet 0    methocarbamol (ROBAXIN) 500 MG tablet TAKE 1 TABLET 3 TIMES A  tablet 3    ibuprofen (ADVIL;MOTRIN) 400 MG tablet 1-2 po tid prn with food 180 tablet 3    oxybutynin (DITROPAN) 5 MG tablet TAKE 1 TABLET TWICE A  tablet 2    montelukast (SINGULAIR) 10 MG tablet TAKE 1 TABLET NIGHTLY 90 tablet 1    pantoprazole (PROTONIX) 40 MG tablet Take 1 tablet by mouth every morning (before breakfast) 90 tablet 0    Handicap Placard MISC by Does not apply route Good for 5  Years expires 7/30/2026 1 each 0    hydrocortisone 2.5 % cream Place rectally as needed      Coenzyme Q10 200 MG CAPS Take 1 tablet by mouth daily 90 capsule 3    Estradiol (VAGIFEM) 10 MCG TABS vaginal tablet       folic acid (FOLVITE) 507 MCG tablet Take 400 mcg by mouth daily      Zinc 50 MG TABS Take by mouth      Ascorbic Acid (VITAMIN C) 1000 MG tablet Take 1,000 mg by mouth daily      Calcium Carbonate Antacid 1000 MG CHEW Take by mouth      Glucos-MSM-C-Df-Kzbyca-Owturv (BL MSM GLUCOSAMINE COMPLEX PO) Take by mouth      [employer]S VITAMIN D3 1000 units CAPS TAKE 1 SOFTGEL BY MOUTH DAILY 90 capsule 1    imiquimod (ALDARA) 5 % cream Apply topically three times a week Indications: [facility] ordering Apply topically three times a week. 12 each 3    Psyllium (METAMUCIL) 28.3 % POWD Take 1 each by mouth daily      lactase (LACTAID) 3000 UNITS tablet Take 1 tablet by mouth 3 times daily (with meals) 90 tablet 3    hydrocortisone (ANUSOL-HC) 2.5 % rectal cream Place rectally 2 times daily.  1 Tube 1    acetaminophen (TYLENOL) 325 MG tablet Take 650 mg by mouth every 6 hours as needed for Pain         This patient presents to the office today for a preoperative consultation at the request of surgeon, Dr. Yaya Chung, who plans on performing CYSTOSCOPY, POSSIBLE URETHRAL DILATATION, POSSIBLE BLADDER BIOPSY on December 13 at [facility].    Planned anesthesia: General   Known anesthesia problems: None   Bleeding risk: No recent or remote history of abnormal bleeding  Personal or name: Not on file    Number of children: 2    Years of education: 15    Highest education level: High school graduate   Occupational History    Occupation: SSI, former /     Employer: DISABLED   Tobacco Use    Smoking status: Former Smoker     Packs/day: 1.50     Years: 35.00     Pack years: 52.50     Types: Cigarettes     Quit date: 2010     Years since quittin.9    Smokeless tobacco: Never Used   Vaping Use    Vaping Use: Never used   Substance and Sexual Activity    Alcohol use: No     Alcohol/week: 0.0 standard drinks    Drug use: Yes     Types: Marijuana Claribel Amble)    Sexual activity: Not Currently     Partners: Male   Other Topics Concern    Not on file   Social History Narrative    Daughter withrachel    Son/grand son in [place] as well    Yoga stretches        Review of Systems  Constitutional:  Negative for activity or appetite change, fever or fatigue  HENT:  Negative for congestion, sinus pressure, or rhinorrhea  Eyes:  Negative for eye pain or visual changes  Resp:  Negative for SOB, chest tightness, cough  Cardiovascular: Negative for CP, palpitations, DIEHL, orthopnea, PND, LE edema  Gastrointestinal: Negative for abd pain, melena, BRBPR, N/V/D  Musculoskeletal:  Negative for back pain or myalgias  Neuro:  Negative for dizziness or lightheadedness  Psych: negative for depression or anxiety       Physical Exam   Constitutional:   · Reviewed vitals above  · Well Nourished, well developed, no distress       HENT:  · Normal external nose without lesions  · Bilateral TMs translucent with normal light reflex and bony landmarks  · Normal oropharynx without erythema or exudate  · Normal nasal mucosa without swelling or erythema  Neck:  · Symmetric and without masses  · No thyromegaly  Resp:  · Normal effort  · Clear to auscultation bilaterally without rhonchi, wheezing or crackles  Cardiovascular:  · On auscultation, normal S1 and S2 without murmurs, rubs or gallops  · No bruits of bilateral carotids and no JVD  Gastrointestinal:  · Nontender, nondistended, and no masses  · No hepatosplenomegaly  Musculoskeletal:  · Normal Gait  · All extremities without clubbing, cyanosis or edema  Skin:  · No rashes on inspection  · No areas of increased heat or induration on palpation  Psych:  · Normal mood and affect  · Normal insight and judgement      Lab Review   Hospital Outpatient Visit on 11/01/2021   Component Date Value    WBC, UA 11/01/2021 10-20*    RBC, UA 11/01/2021 3-4     Epithelial Cells, UA 11/01/2021 11-20*    Bacteria, UA 11/01/2021 2+*    Urine Type 11/01/2021 NotGiven     Color, UA 11/01/2021 Straw     Clarity, UA 11/01/2021 SL CLOUDY*    Glucose, Ur 11/01/2021 Negative     Bilirubin Urine 11/01/2021 Negative     Ketones, Urine 11/01/2021 Negative     Specific Gravity, UA 11/01/2021 1.025     Blood, Urine 11/01/2021 Negative     pH, UA 11/01/2021 5.5     Protein, UA 11/01/2021 Negative     Urobilinogen, Urine 11/01/2021 0.2     Nitrite, Urine 11/01/2021 Negative     Leukocyte Esterase, Urine 11/01/2021 MODERATE*    Microscopic Examination 11/01/2021 YES             Urine Reflex to Culture 11/01/2021 Yes     Organism 11/01/2021 Escherichia coli*    Urine Culture, Routine 11/01/2021 >100,000 CFU/ml           Hospital Outpatient Visit on 11/01/2021   Component Date Value    Sodium 11/01/2021 139     Potassium 11/01/2021 4.6     Chloride 11/01/2021 102     CO2 11/01/2021 26     Anion Gap 11/01/2021 11     Glucose 11/01/2021 98     BUN 11/01/2021 14     CREATININE 11/01/2021 0.8     GFR Non- 11/01/2021 >60     GFR  11/01/2021 >60     Calcium 11/01/2021 9.3           Total Protein 11/01/2021 6.6     Albumin 11/01/2021 4.3     Albumin/Globulin Ratio 11/01/2021 1.9     Total Bilirubin 11/01/2021 <0.2     Alkaline Phosphatase 11/01/2021 72     ALT 11/01/2021 16     AST 11/01/2021 19           T4 Free 11/01/2021 1.3     TSH 11/01/2021 2.26           Cholesterol, Total 11/01/2021 206*    Triglycerides 11/01/2021 122     HDL 11/01/2021 60     LDL Calculated 11/01/2021 122*    VLDL Cholesterol Calcula* 11/01/2021 24            Assessment:       73 y.o. patient with planned surgery as above. Known risk factors for perioperative complications:   · COPD (tobacco dep in remission): Well controlled. · Hypothyroidism: controlled. Continue meds  · H/o anemia:  Checking H/H        Current medications which may produce withdrawal symptoms if withheld perioperatively: none      Plan         RCRI risk score 0 risk factors    Functional Capacity:  Determination not indicated as patient is low risk of MACE based on combined clinical and surgical risks. 1. Preoperative workup as follows: EKG and H/H. Also reviewed all recent normal blood work shows above    2. Change current medications as follows: discontinue ibuprofen 7 days prior to procedure    3. Prophylaxis for cardiac events with   A)  perioperative beta-blockers: Not indicated  B)  perioperative statins: not indicated    4. As outlined above, measures were taken to assess risk, and decrease risk when possible. Risks of surgery were discussed with patient, and benefits believed to outweigh risks. Patient is making an informed decision to proceed with surgery with an understanding of any risk,  Please follow all pre-op recommendations above.

## 2021-11-29 NOTE — TELEPHONE ENCOUNTER
DR Christopher May,    Patient states pharmacy only gave 30 pills of methocarbamol instead of the 90 prescribed. She states when she got home, she poured the 30 pills into an old methocarbamol bottle that she still had 5 pills left in    She went back to the pharmacy, but b/c she only had the old bottle, the pharmacist states she needs a new prescription    I let her know you would have to approve since I didn't know her and you were prescribing.

## 2021-11-29 NOTE — LETTER
Preoperative Consultation        [address], [address] [place] [address] [place] [address]         Phone: [phone]      Memory Parkinson  YOB: 1950    Date of Service:  11/29/2021    Vitals:    11/29/21 1451   BP: 124/72   Site: Left Upper Arm   Position: Sitting   Pulse: 80   Temp: 98.4 °F (36.9 °C)   TempSrc: Temporal   SpO2: 97%   Weight: 219 lb (99.3 kg)      Wt Readings from Last 2 Encounters:   11/29/21 219 lb (99.3 kg)   11/01/21 220 lb 12.8 oz (100.2 kg)     BP Readings from Last 3 Encounters:   11/29/21 124/72   11/01/21 130/72   07/30/21 124/62        Chief Complaint   Patient presents with   [place] Pre-op Exam     Bladder surgery 12.13.21     Allergies   Allergen Reactions    Latex Rash    Penicillins Shortness Of Breath    Lipitor [Atorvastatin Calcium] Other (See Comments)     Muscle cramps    Nexium [Esomeprazole Magnesium] Other (See Comments)     Cramps, including feet, legs, and sides of abdomen    Bactrim [Sulfamethoxazole-Trimethoprim] Other (See Comments)     Dry mouth    Ciprofloxacin Rash     Rash, headache    Etodolac Rash    Flagyl [Metronidazole] Rash    Naprosyn [Naproxen] Nausea And Vomiting     GI upset    Prednisone Other (See Comments)     Headache, increased BP     Outpatient Medications Marked as Taking for the 11/29/21 encounter (Office Visit) with Beatriz Cohen MD   Medication Sig Dispense Refill    albuterol sulfate  (90 Base) MCG/ACT inhaler USE 2 INHALATIONS ORALLY   EVERY 6 HOURS AS NEEDED FORWHEEZING 54 g 1    cetirizine (ZYRTEC) 10 MG tablet TAKE 1 TABLET BY MOUTH EVERY NIGHT 90 tablet 0    fluticasone (FLONASE) 50 MCG/ACT nasal spray SHAKE LIQUID AND USE 2 SPRAYS IN EACH NOSTRIL DAILY 48 g 1    levothyroxine (SYNTHROID) 100 MCG tablet TAKE 1 TABLET EVERY DAY 90 tablet 0    methocarbamol (ROBAXIN) 500 MG tablet TAKE 1 TABLET 3 TIMES A  tablet 3    ibuprofen (ADVIL;MOTRIN) 400 MG tablet 1-2 po tid prn with food 180 tablet 3    oxybutynin (DITROPAN) 5 MG tablet TAKE 1 TABLET TWICE A  tablet 2    montelukast (SINGULAIR) 10 MG tablet TAKE 1 TABLET NIGHTLY 90 tablet 1    pantoprazole (PROTONIX) 40 MG tablet Take 1 tablet by mouth every morning (before breakfast) 90 tablet 0    Handicap Placard MISC by Does not apply route Good for 5  Years expires 7/30/2026 1 each 0    hydrocortisone 2.5 % cream Place rectally as needed      Coenzyme Q10 200 MG CAPS Take 1 tablet by mouth daily 90 capsule 3    Estradiol (VAGIFEM) 10 MCG TABS vaginal tablet       folic acid (FOLVITE) 856 MCG tablet Take 400 mcg by mouth daily      Zinc 50 MG TABS Take by mouth      Ascorbic Acid (VITAMIN C) 1000 MG tablet Take 1,000 mg by mouth daily      Calcium Carbonate Antacid 1000 MG CHEW Take by mouth      Glucos-MSM-C-Jb-Ezccef-Btwdxl (BL MSM GLUCOSAMINE COMPLEX PO) Take by mouth      CVS VITAMIN D3 1000 units CAPS TAKE 1 SOFTGEL BY MOUTH DAILY 90 capsule 1    imiquimod (ALDARA) 5 % cream Apply topically three times a week Indications: [facility] ordering Apply topically three times a week. 12 each 3    Psyllium (METAMUCIL) 28.3 % POWD Take 1 each by mouth daily      lactase (LACTAID) 3000 UNITS tablet Take 1 tablet by mouth 3 times daily (with meals) 90 tablet 3    hydrocortisone (ANUSOL-HC) 2.5 % rectal cream Place rectally 2 times daily.  1 Tube 1    acetaminophen (TYLENOL) 325 MG tablet Take 650 mg by mouth every 6 hours as needed for Pain         This patient presents to the office today for a preoperative consultation at the request of surgeon, Dr. Charles Gaxiola, who plans on performing CYSTOSCOPY, POSSIBLE URETHRAL DILATATION, POSSIBLE BLADDER BIOPSY on December 13 at [facility].    Planned anesthesia: General   Known anesthesia problems: None   Bleeding risk: No recent or remote history of abnormal bleeding  Personal or FH of DVT/PE: No    Patient objection to receiving blood products: No    Patient Active Problem List   Diagnosis    Hypothyroid    GERD (gastroesophageal reflux disease)    Hyperlipidemia    H/O vulvar dysplasia    Localized osteoarthrosis, lower leg    Vaginal atrophy    DDD (degenerative disc disease), lumbar    Osteoarthritis of both hips    Chest skin lesion    Lumbosacral spondylosis without myelopathy    Displacement of lumbar intervertebral disc without myelopathy    Anxiety    Primary insomnia    Chronic midline thoracic back pain    Mild intermittent asthma without complication    Tonsil stone    Mixed stress and urge urinary incontinence    Obesity (BMI 30-39. 9)    Spider veins of both lower extremities       Past Medical History:   Diagnosis Date    Allergic rhinitis     Anxiety     Asthma     Bipolar 1 disorder ([facility] [address]     questionable dx per patient    Chronic low back pain     COPD (chronic obstructive pulmonary disease) (HCC)     Decreased bone density 9-9-2008    First degree hemorrhoids 9/1/2016    Former smoker 6/13/2013    GERD (gastroesophageal reflux disease)     H/O vulvar dysplasia 1/9/2015    VERONICA 3 wide local excision     Hyperlipidemia     Hypothyroid     Hypothyroidism     Insomnia     Obesity (BMI 30-39.9) 3/16/2021    Osteoarthritis     Urinary incontinence      Past Surgical History:   Procedure Laterality Date    COLONOSCOPY  18 Apr 2016    Diverticulosis, hemorrhoid    HYSTERECTOMY  2001    Partial ? Endometriosis - Benign    TUBAL LIGATION  1981     Family History   Problem Relation Age of Onset    Heart Disease Mother     Cancer Mother         uterine?     Mental Illness Father     Cancer Father         prostate    Stroke Father     Schizophrenia Father     Cancer Sister         skin    Bipolar Disorder Sister     Depression Son         PTSD    OCD Son        Social History     Socioeconomic History    Marital status: Single     Spouse name: Not on file    Number of children: 2    Years of education: 15    Highest education level: High school graduate   Occupational History    Occupation: SSI, former /     Employer: DISABLED   Tobacco Use    Smoking status: Former Smoker     Packs/day: 1.50     Years: 35.00     Pack years: 52.50     Types: Cigarettes     Quit date: 2010     Years since quittin.9    Smokeless tobacco: Never Used   Vaping Use    Vaping Use: Never used   Substance and Sexual Activity    Alcohol use: No     Alcohol/week: 0.0 standard drinks    Drug use: Yes     Types: Marijuana Hulon Quintana)    Sexual activity: Not Currently     Partners: Male   Other Topics Concern    Not on file   Social History Narrative    Daughter withrachel    Son/grand son in [place] as well    Yoga stretches        Review of Systems  Constitutional:  Negative for activity or appetite change, fever or fatigue  HENT:  Negative for congestion, sinus pressure, or rhinorrhea  Eyes:  Negative for eye pain or visual changes  Resp:  Negative for SOB, chest tightness, cough  Cardiovascular: Negative for CP, palpitations, DIEHL, orthopnea, PND, LE edema  Gastrointestinal: Negative for abd pain, melena, BRBPR, N/V/D  Musculoskeletal:  Negative for back pain or myalgias  Neuro:  Negative for dizziness or lightheadedness  Psych: negative for depression or anxiety       Physical Exam   Constitutional:   · Reviewed vitals above  · Well Nourished, well developed, no distress       HENT:  · Normal external nose without lesions  · Bilateral TMs translucent with normal light reflex and bony landmarks  · Normal oropharynx without erythema or exudate  · Normal nasal mucosa without swelling or erythema  Neck:  · Symmetric and without masses  · No thyromegaly  Resp:  · Normal effort  · Clear to auscultation bilaterally without rhonchi, wheezing or crackles  Cardiovascular:  · On auscultation, normal S1 and S2 without murmurs, rubs or gallops  · No bruits of bilateral carotids and no JVD  Gastrointestinal:  · Nontender, nondistended, and no masses  · No hepatosplenomegaly  Musculoskeletal:  · Normal Gait  · All extremities without clubbing, cyanosis or edema  Skin:  · No rashes on inspection  · No areas of increased heat or induration on palpation  Psych:  · Normal mood and affect  · Normal insight and judgement      Lab Review   Hospital Outpatient Visit on 11/01/2021   Component Date Value    WBC, UA 11/01/2021 10-20*    RBC, UA 11/01/2021 3-4     Epithelial Cells, UA 11/01/2021 11-20*    Bacteria, UA 11/01/2021 2+*    Urine Type 11/01/2021 NotGiven     Color, UA 11/01/2021 Straw     Clarity, UA 11/01/2021 SL CLOUDY*    Glucose, Ur 11/01/2021 Negative     Bilirubin Urine 11/01/2021 Negative     Ketones, Urine 11/01/2021 Negative     Specific Gravity, UA 11/01/2021 1.025     Blood, Urine 11/01/2021 Negative     pH, UA 11/01/2021 5.5     Protein, UA 11/01/2021 Negative     Urobilinogen, Urine 11/01/2021 0.2     Nitrite, Urine 11/01/2021 Negative     Leukocyte Esterase, Urine 11/01/2021 MODERATE*    Microscopic Examination 11/01/2021 YES             Urine Reflex to Culture 11/01/2021 Yes     Organism 11/01/2021 Escherichia coli*    Urine Culture, Routine 11/01/2021 >100,000 CFU/ml           Hospital Outpatient Visit on 11/01/2021   Component Date Value    Sodium 11/01/2021 139     Potassium 11/01/2021 4.6     Chloride 11/01/2021 102     CO2 11/01/2021 26     Anion Gap 11/01/2021 11     Glucose 11/01/2021 98     BUN 11/01/2021 14     CREATININE 11/01/2021 0.8     GFR Non- 11/01/2021 >60     GFR  11/01/2021 >60     Calcium 11/01/2021 9.3           Total Protein 11/01/2021 6.6     Albumin 11/01/2021 4.3     Albumin/Globulin Ratio 11/01/2021 1.9     Total Bilirubin 11/01/2021 <0.2     Alkaline Phosphatase 11/01/2021 72     ALT 11/01/2021 16     AST 11/01/2021 19           T4 Free 11/01/2021 1.3     TSH 11/01/2021 2.26           Cholesterol, Total 11/01/2021 206*    Triglycerides 11/01/2021 122     HDL 11/01/2021 60     LDL Calculated 11/01/2021 122*    VLDL Cholesterol Calcula* 11/01/2021 24            Assessment:       73 y.o. patient with planned surgery as above. Known risk factors for perioperative complications:   · COPD (tobacco dep in remission): Well controlled. · Hypothyroidism: controlled. Continue meds  · H/o anemia:  Checking H/H        Current medications which may produce withdrawal symptoms if withheld perioperatively: none      Plan         RCRI risk score 0 risk factors    Functional Capacity:  Determination not indicated as patient is low risk of MACE based on combined clinical and surgical risks. 1. Preoperative workup as follows: EKG and H/H. Also reviewed all recent normal blood work shows above    2. Change current medications as follows: discontinue ibuprofen 7 days prior to procedure    3. Prophylaxis for cardiac events with   A)  perioperative beta-blockers: Not indicated  B)  perioperative statins: not indicated    4. As outlined above, measures were taken to assess risk, and decrease risk when possible. Risks of surgery were discussed with patient, and benefits believed to outweigh risks. Patient is making an informed decision to proceed with surgery with an understanding of any risk,  Please follow all pre-op recommendations above.         Bimal Barclay MD

## 2021-12-06 ENCOUNTER — TELEPHONE (OUTPATIENT)
Dept: PRIMARY CARE CLINIC | Age: 71
End: 2021-12-06

## 2021-12-06 ENCOUNTER — ANESTHESIA EVENT (OUTPATIENT)
Dept: OPERATING ROOM | Age: 71
End: 2021-12-06
Payer: MEDICARE

## 2021-12-06 DIAGNOSIS — M54.6 CHRONIC MIDLINE THORACIC BACK PAIN: ICD-10-CM

## 2021-12-06 DIAGNOSIS — M54.50 CHRONIC BILATERAL LOW BACK PAIN WITHOUT SCIATICA: ICD-10-CM

## 2021-12-06 DIAGNOSIS — M47.817 LUMBOSACRAL SPONDYLOSIS WITHOUT MYELOPATHY: Chronic | ICD-10-CM

## 2021-12-06 DIAGNOSIS — M51.36 DDD (DEGENERATIVE DISC DISEASE), LUMBAR: ICD-10-CM

## 2021-12-06 DIAGNOSIS — G89.29 CHRONIC BILATERAL LOW BACK PAIN WITHOUT SCIATICA: ICD-10-CM

## 2021-12-06 DIAGNOSIS — G89.29 CHRONIC MIDLINE THORACIC BACK PAIN: ICD-10-CM

## 2021-12-06 RX ORDER — METHOCARBAMOL 500 MG/1
TABLET, FILM COATED ORAL
Qty: 270 TABLET | Refills: 3 | Status: CANCELLED | OUTPATIENT
Start: 2021-12-06

## 2021-12-06 NOTE — TELEPHONE ENCOUNTER
PT called to confirm that she refills were sent. She wanted her methocarbamol (ROBAXIN) 500 MG tablet to go to the [facility] on file. She is going to call and try to get the [facility] mail service to transfer it but requested that we try to change this as well.

## 2021-12-07 ENCOUNTER — HOSPITAL ENCOUNTER (OUTPATIENT)
Age: 71
Discharge: HOME | End: 2021-12-07
Payer: MEDICARE

## 2021-12-07 PROCEDURE — U0003 INFECTIOUS AGENT DETECTION BY NUCLEIC ACID (DNA OR RNA); SEVERE ACUTE RESPIRATORY SYNDROME CORONAVIRUS 2 (SARS-COV-2) (CORONAVIRUS DISEASE [COVID-19]), AMPLIFIED PROBE TECHNIQUE, MAKING USE OF HIGH THROUGHPUT TECHNOLOGIES AS DESCRIBED BY CMS-2020-01-R: HCPCS

## 2021-12-07 PROCEDURE — U0005 INFEC AGEN DETEC AMPLI PROBE: HCPCS

## 2021-12-08 LAB — SARS-COV-2: NOT DETECTED

## 2021-12-09 NOTE — PROGRESS NOTES

## 2021-12-09 NOTE — PROGRESS NOTES
Dian Julien Preoperative Screening for Elective Surgery/Invasive Procedures While COVID-19 present in the community     Have you had any of the following symptoms? o Fever, chills  o Cough  o Shortness of breath  o Muscle aches/pain  o Diarrhea  o Abdominal pain, nausea, vomiting  o Loss or decrease in taste and / or smell   Risk of Exposure  o Have you recently been hospitalized for COVID-19 or flu-like illness, if so when?  o Recently diagnosed with COVID-19, if so when?  o Recently tested for COVID-19, if so when?  o Have you been in close contact with a person or family member who currently has or recently had COVID-19? If yes, when and in what context?  o Do you live with anybody who in the last 14 days has had fever, chills, shortness of breath, muscle aches, flu-like illness?  o Do you have any close contacts or family members who are currently in the hospital for COVID-19 or flu-like illness? If yes, assess recent close contact with this person. Indicate if the patient has a positive screen by answering yes to one or more of the above questions. Patients who test positive or screen positive prior to surgery or on the day of surgery should be evaluated in conjunction with the surgeon/proceduralist/anesthesiologist to determine the urgency of the procedure.

## 2021-12-13 ENCOUNTER — HOSPITAL ENCOUNTER (OUTPATIENT)
Age: 71
Setting detail: OUTPATIENT SURGERY
Discharge: HOME | End: 2021-12-13
Attending: UROLOGY | Admitting: UROLOGY
Payer: MEDICARE

## 2021-12-13 ENCOUNTER — ANESTHESIA (OUTPATIENT)
Dept: OPERATING ROOM | Age: 71
End: 2021-12-13
Payer: MEDICARE

## 2021-12-13 VITALS
SYSTOLIC BLOOD PRESSURE: 114 MMHG | OXYGEN SATURATION: 97 % | BODY MASS INDEX: 36.7 KG/M2 | RESPIRATION RATE: 14 BRPM | HEART RATE: 59 BPM | DIASTOLIC BLOOD PRESSURE: 74 MMHG | TEMPERATURE: 97.2 F | WEIGHT: 215 LBS | HEIGHT: 64 IN

## 2021-12-13 VITALS
DIASTOLIC BLOOD PRESSURE: 57 MMHG | OXYGEN SATURATION: 95 % | RESPIRATION RATE: 7 BRPM | SYSTOLIC BLOOD PRESSURE: 101 MMHG

## 2021-12-13 PROCEDURE — 7100000011 HC PHASE II RECOVERY - ADDTL 15 MIN: Performed by: UROLOGY

## 2021-12-13 PROCEDURE — 3700000000 HC ANESTHESIA ATTENDED CARE: Performed by: UROLOGY

## 2021-12-13 PROCEDURE — 2500000003 HC RX 250 WO HCPCS: Performed by: NURSE ANESTHETIST, CERTIFIED REGISTERED

## 2021-12-13 PROCEDURE — 3600000004 HC SURGERY LEVEL 4 BASE: Performed by: UROLOGY

## 2021-12-13 PROCEDURE — 6360000002 HC RX W HCPCS: Performed by: NURSE ANESTHETIST, CERTIFIED REGISTERED

## 2021-12-13 PROCEDURE — 6360000002 HC RX W HCPCS: Performed by: UROLOGY

## 2021-12-13 PROCEDURE — 7100000010 HC PHASE II RECOVERY - FIRST 15 MIN: Performed by: UROLOGY

## 2021-12-13 PROCEDURE — 6370000000 HC RX 637 (ALT 250 FOR IP): Performed by: UROLOGY

## 2021-12-13 PROCEDURE — 6360000002 HC RX W HCPCS: Performed by: ANESTHESIOLOGY

## 2021-12-13 PROCEDURE — 2500000003 HC RX 250 WO HCPCS: Performed by: ANESTHESIOLOGY

## 2021-12-13 PROCEDURE — 2709999900 HC NON-CHARGEABLE SUPPLY: Performed by: UROLOGY

## 2021-12-13 PROCEDURE — 2580000003 HC RX 258: Performed by: NURSE ANESTHETIST, CERTIFIED REGISTERED

## 2021-12-13 PROCEDURE — 2580000003 HC RX 258: Performed by: UROLOGY

## 2021-12-13 PROCEDURE — 2580000003 HC RX 258: Performed by: ANESTHESIOLOGY

## 2021-12-13 RX ORDER — SODIUM CHLORIDE 0.9 % (FLUSH) 0.9 %
10 SYRINGE (ML) INJECTION EVERY 12 HOURS SCHEDULED
Status: DISCONTINUED | OUTPATIENT
Start: 2021-12-13 | End: 2021-12-13 | Stop reason: HOSPADM

## 2021-12-13 RX ORDER — HYDRALAZINE HYDROCHLORIDE 20 MG/ML
5 INJECTION INTRAMUSCULAR; INTRAVENOUS EVERY 10 MIN PRN
Status: DISCONTINUED | OUTPATIENT
Start: 2021-12-13 | End: 2021-12-13 | Stop reason: HOSPADM

## 2021-12-13 RX ORDER — GENTAMICIN SULFATE 80 MG/100ML
80 INJECTION, SOLUTION INTRAVENOUS ONCE
Status: COMPLETED | OUTPATIENT
Start: 2021-12-13 | End: 2021-12-13

## 2021-12-13 RX ORDER — LIDOCAINE HYDROCHLORIDE 20 MG/ML
JELLY TOPICAL PRN
Status: DISCONTINUED | OUTPATIENT
Start: 2021-12-13 | End: 2021-12-13 | Stop reason: ALTCHOICE

## 2021-12-13 RX ORDER — MEPERIDINE HYDROCHLORIDE 25 MG/ML
12.5 INJECTION INTRAMUSCULAR; INTRAVENOUS; SUBCUTANEOUS EVERY 5 MIN PRN
Status: DISCONTINUED | OUTPATIENT
Start: 2021-12-13 | End: 2021-12-13 | Stop reason: HOSPADM

## 2021-12-13 RX ORDER — ONDANSETRON 2 MG/ML
4 INJECTION INTRAMUSCULAR; INTRAVENOUS
Status: DISCONTINUED | OUTPATIENT
Start: 2021-12-13 | End: 2021-12-13 | Stop reason: HOSPADM

## 2021-12-13 RX ORDER — SODIUM CHLORIDE 0.9 % (FLUSH) 0.9 %
10 SYRINGE (ML) INJECTION PRN
Status: DISCONTINUED | OUTPATIENT
Start: 2021-12-13 | End: 2021-12-13 | Stop reason: HOSPADM

## 2021-12-13 RX ORDER — FENTANYL CITRATE 50 UG/ML
25 INJECTION, SOLUTION INTRAMUSCULAR; INTRAVENOUS EVERY 5 MIN PRN
Status: DISCONTINUED | OUTPATIENT
Start: 2021-12-13 | End: 2021-12-13 | Stop reason: HOSPADM

## 2021-12-13 RX ORDER — OXYCODONE HYDROCHLORIDE AND ACETAMINOPHEN 5; 325 MG/1; MG/1
1 TABLET ORAL PRN
Status: DISCONTINUED | OUTPATIENT
Start: 2021-12-13 | End: 2021-12-13 | Stop reason: HOSPADM

## 2021-12-13 RX ORDER — PROMETHAZINE HYDROCHLORIDE 25 MG/ML
6.25 INJECTION, SOLUTION INTRAMUSCULAR; INTRAVENOUS
Status: DISCONTINUED | OUTPATIENT
Start: 2021-12-13 | End: 2021-12-13 | Stop reason: HOSPADM

## 2021-12-13 RX ORDER — SODIUM CHLORIDE, SODIUM LACTATE, POTASSIUM CHLORIDE, CALCIUM CHLORIDE 600; 310; 30; 20 MG/100ML; MG/100ML; MG/100ML; MG/100ML
INJECTION, SOLUTION INTRAVENOUS CONTINUOUS
Status: DISCONTINUED | OUTPATIENT
Start: 2021-12-13 | End: 2021-12-13 | Stop reason: HOSPADM

## 2021-12-13 RX ORDER — NITROFURANTOIN MACROCRYSTALS 50 MG/1
50 CAPSULE ORAL 2 TIMES DAILY
Qty: 10 CAPSULE | Refills: 0 | Status: SHIPPED | OUTPATIENT
Start: 2021-12-13 | End: 2021-12-18

## 2021-12-13 RX ORDER — MIDAZOLAM HYDROCHLORIDE 1 MG/ML
2 INJECTION INTRAMUSCULAR; INTRAVENOUS
Status: DISCONTINUED | OUTPATIENT
Start: 2021-12-13 | End: 2021-12-13 | Stop reason: HOSPADM

## 2021-12-13 RX ORDER — MAGNESIUM HYDROXIDE 1200 MG/15ML
LIQUID ORAL PRN
Status: DISCONTINUED | OUTPATIENT
Start: 2021-12-13 | End: 2021-12-13 | Stop reason: ALTCHOICE

## 2021-12-13 RX ORDER — PHENAZOPYRIDINE HYDROCHLORIDE 200 MG/1
200 TABLET, FILM COATED ORAL 3 TIMES DAILY PRN
Qty: 15 TABLET | Refills: 0 | Status: SHIPPED | OUTPATIENT
Start: 2021-12-13 | End: 2021-12-18

## 2021-12-13 RX ORDER — SODIUM CHLORIDE, SODIUM LACTATE, POTASSIUM CHLORIDE, CALCIUM CHLORIDE 600; 310; 30; 20 MG/100ML; MG/100ML; MG/100ML; MG/100ML
INJECTION, SOLUTION INTRAVENOUS CONTINUOUS PRN
Status: DISCONTINUED | OUTPATIENT
Start: 2021-12-13 | End: 2021-12-13 | Stop reason: SDUPTHER

## 2021-12-13 RX ORDER — OXYCODONE HYDROCHLORIDE AND ACETAMINOPHEN 5; 325 MG/1; MG/1
2 TABLET ORAL PRN
Status: DISCONTINUED | OUTPATIENT
Start: 2021-12-13 | End: 2021-12-13 | Stop reason: HOSPADM

## 2021-12-13 RX ORDER — SODIUM CHLORIDE 9 MG/ML
25 INJECTION, SOLUTION INTRAVENOUS PRN
Status: DISCONTINUED | OUTPATIENT
Start: 2021-12-13 | End: 2021-12-13 | Stop reason: HOSPADM

## 2021-12-13 RX ORDER — FENTANYL CITRATE 50 UG/ML
INJECTION, SOLUTION INTRAMUSCULAR; INTRAVENOUS PRN
Status: DISCONTINUED | OUTPATIENT
Start: 2021-12-13 | End: 2021-12-13 | Stop reason: SDUPTHER

## 2021-12-13 RX ORDER — LIDOCAINE HYDROCHLORIDE 20 MG/ML
INJECTION, SOLUTION EPIDURAL; INFILTRATION; INTRACAUDAL; PERINEURAL PRN
Status: DISCONTINUED | OUTPATIENT
Start: 2021-12-13 | End: 2021-12-13 | Stop reason: SDUPTHER

## 2021-12-13 RX ORDER — PROPOFOL 10 MG/ML
INJECTION, EMULSION INTRAVENOUS PRN
Status: DISCONTINUED | OUTPATIENT
Start: 2021-12-13 | End: 2021-12-13 | Stop reason: SDUPTHER

## 2021-12-13 RX ADMIN — MIDAZOLAM HYDROCHLORIDE 2 MG: 1 INJECTION, SOLUTION INTRAMUSCULAR; INTRAVENOUS at 12:38

## 2021-12-13 RX ADMIN — SODIUM CHLORIDE, POTASSIUM CHLORIDE, SODIUM LACTATE AND CALCIUM CHLORIDE: 600; 310; 30; 20 INJECTION, SOLUTION INTRAVENOUS at 12:39

## 2021-12-13 RX ADMIN — GENTAMICIN SULFATE 80 MG: 80 INJECTION, SOLUTION INTRAVENOUS at 13:25

## 2021-12-13 RX ADMIN — SODIUM CHLORIDE, POTASSIUM CHLORIDE, SODIUM LACTATE AND CALCIUM CHLORIDE: 600; 310; 30; 20 INJECTION, SOLUTION INTRAVENOUS at 13:23

## 2021-12-13 RX ADMIN — FAMOTIDINE 20 MG: 10 INJECTION, SOLUTION INTRAVENOUS at 12:38

## 2021-12-13 RX ADMIN — FENTANYL CITRATE 50 MCG: 50 INJECTION INTRAMUSCULAR; INTRAVENOUS at 13:30

## 2021-12-13 RX ADMIN — FENTANYL CITRATE 50 MCG: 50 INJECTION INTRAMUSCULAR; INTRAVENOUS at 13:23

## 2021-12-13 RX ADMIN — PROPOFOL 200 MG: 10 INJECTION, EMULSION INTRAVENOUS at 13:34

## 2021-12-13 RX ADMIN — LIDOCAINE HYDROCHLORIDE 50 MG: 20 INJECTION, SOLUTION EPIDURAL; INFILTRATION; INTRACAUDAL; PERINEURAL at 13:34

## 2021-12-13 ASSESSMENT — PULMONARY FUNCTION TESTS
PIF_VALUE: 0

## 2021-12-13 ASSESSMENT — PAIN SCALES - GENERAL: PAINLEVEL_OUTOF10: 0

## 2021-12-13 ASSESSMENT — PAIN - FUNCTIONAL ASSESSMENT: PAIN_FUNCTIONAL_ASSESSMENT: 0-10

## 2021-12-13 ASSESSMENT — LIFESTYLE VARIABLES: SMOKING_STATUS: 0

## 2021-12-13 ASSESSMENT — PAIN DESCRIPTION - DESCRIPTORS: DESCRIPTORS: CONSTANT

## 2021-12-13 NOTE — H&P
Dr. Esau Zuñiga Same Day Surgery H&P     12/13/2021  Heather Chambers    Reason for Surgery:  Voiding difficulty  Requesting Physician:  ER/Fam       History Obtained From:  patient, electronic medical record    HISTORY OF PRESENT ILLNESS:                The patient is a 73 y.o. female who presents with voiding issues. I am taking the patient to surgery for evaluation and care of this problem. Past Medical History:        Diagnosis Date    Allergic rhinitis     Anxiety     Asthma     Bipolar 1 disorder ([address]     questionable dx per patient    Chronic low back pain     COPD (chronic obstructive pulmonary disease) (HCC)     Decreased bone density 9-9-2008    First degree hemorrhoids 9/1/2016    Former smoker 6/13/2013    GERD (gastroesophageal reflux disease)     H/O vulvar dysplasia 1/9/2015    VERONICA 3 wide local excision     Hyperlipidemia     Hypothyroid     Hypothyroidism     Insomnia     Obesity (BMI 30-39.9) 3/16/2021    Osteoarthritis     Prolonged emergence from general anesthesia     Urinary incontinence      Past Surgical History:        Procedure Laterality Date    COLONOSCOPY  18 Apr 2016    Diverticulosis, hemorrhoid    HYSTERECTOMY  2001    Partial ? Endometriosis - Benign    TUBAL LIGATION  1981     Current Medications:   Prior to Admission medications    Medication Sig Start Date End Date Taking? Authorizing Provider   methocarbamol (ROBAXIN) 500 MG tablet TAKE 1 TABLET 3 TIMES A DAY 11/29/21  Yes Paloma Torres, DO   albuterol sulfate  (90 Base) MCG/ACT inhaler USE 2 INHALATIONS ORALLY   EVERY 6 HOURS AS NEEDED FORWHEEZING 11/1/21  Yes Lorin Torres, DO   cetirizine (ZYRTEC) 10 MG tablet TAKE 1 TABLET BY MOUTH EVERY NIGHT 11/1/21  Yes Lorin Torres, DO   fluticasone Frosty Cuna) 50 MCG/ACT nasal spray SHAKE LIQUID AND USE 2 SPRAYS IN [facility] NOSTRIL DAILY 11/1/21  Yes Lorin Torres, DO   levothyroxine (SYNTHROID) 100 MCG tablet TAKE 1 TABLET EVERY DAY 11/1/21  Yes Shiela Bloch. Collette Connor.,    oxybutynin (DITROPAN) 5 MG tablet TAKE 1 TABLET TWICE A DAY 11/1/21  Yes Shiela Bloch. Collette Connor., DO   montelukast (SINGULAIR) 10 MG tablet TAKE 1 TABLET NIGHTLY 11/1/21  Yes Claiborne Narrow M. Collette Connor., DO   pantoprazole (PROTONIX) 40 MG tablet Take 1 tablet by mouth every morning (before breakfast) 11/1/21  Yes Shiela Bloch. Collette Connor.,    Coenzyme Q10 200 MG CAPS Take 1 tablet by mouth daily 10/23/19  Yes Riley Hernandez MD   folic acid (FOLVITE) 781 MCG tablet Take 400 mcg by mouth daily   Yes Historical Provider, MD   Zinc 50 MG TABS Take by mouth   Yes Historical Provider, MD   Ascorbic Acid (VITAMIN C) 1000 MG tablet Take 1,000 mg by mouth daily   Yes Historical Provider, MD   Calcium Carbonate Antacid 1000 MG CHEW Take by mouth   Yes Historical Provider, MD   Glucos-MSM-C-Ny-Zwywhb-Azzcnq (BL MSM GLUCOSAMINE COMPLEX PO) Take by mouth   Yes Historical Provider, MD   [facility] VITAMIN D3 1000 units CAPS TAKE 1 SOFTGEL BY MOUTH DAILY 9/5/17  Yes RUSLAN Ramsey CNP   imiquimod (ALDARA) 5 % cream Apply topically three times a week Indications: [facility] ordering Apply topically three times a week. 8/9/17  Yes RUSLAN Paz CNP   Psyllium (METAMUCIL) 28.3 % POWD Take 1 each by mouth daily   Yes Historical Provider, MD   lactase (LACTAID) 3000 UNITS tablet Take 1 tablet by mouth 3 times daily (with meals) 11/28/16  Yes Serene Valdovinos MD   acetaminophen (TYLENOL) 325 MG tablet Take 650 mg by mouth every 6 hours as needed for Pain   Yes Historical Provider, MD   ibuprofen (ADVIL;MOTRIN) 400 MG tablet 1-2 po tid prn with food 11/1/21   Shiela Bloch. Collette Connor., DO   fluticasone [facility]) 50 MCG/ACT nasal spray 2 sprays by Each Nostril route daily 4/21/21   MD Elis Luis MISC by Does not apply route Good for 5  years 10/15/20 7/30/21  Shiela Bloch.  Collette Connor., DO   Estradiol (VAGIFEM) 10 MCG TABS vaginal tablet  9/4/19 MCV 87.8 07/15/2020    MCH 28.7 07/15/2020    MCHC 32.7 07/15/2020    RDW 14.2 07/15/2020     07/15/2020    MPV 8.0 07/15/2020     BMP:    Lab Results   Component Value Date     11/01/2021    K 4.6 11/01/2021    K 3.9 05/29/2020     11/01/2021    CO2 26 11/01/2021    BUN 14 11/01/2021    LABALBU 4.3 11/01/2021    CREATININE 0.8 11/01/2021    CALCIUM 9.3 11/01/2021    GFRAA >60 11/01/2021    GFRAA >60 08/10/2011    LABGLOM >60 11/01/2021    GLUCOSE 98 11/01/2021    GLUCOSE 93 12/29/2016     U/A:    Lab Results   Component Value Date    NITRITE small 04/21/2021    COLORU Straw 11/01/2021    PROTEINU Negative 11/01/2021    PHUR 5.5 11/01/2021    WBCUA 10-20 11/01/2021    RBCUA 3-4 11/01/2021    MUCUS Rare 03/16/2021    BACTERIA 2+ 11/01/2021    CLARITYU SL CLOUDY 11/01/2021    SPECGRAV 1.025 11/01/2021    LEUKOCYTESUR MODERATE 11/01/2021    UROBILINOGEN 0.2 11/01/2021    BILIRUBINUR Negative 11/01/2021    BILIRUBINUR neg 04/21/2021    BLOODU Negative 11/01/2021    GLUCOSEU Negative 11/01/2021       IMPRESSION/RECOMMENDATIONS:   Patient will be taken to surgery for definitive care for the presenting problem(s). The patient has been informed of the goals, risks and benefits of the procedure. Informed consent has been obtained and all of the patient's questions were answered to their satisfaction. The patient wishes to proceed with the planned surgery.       Romeo Winters MD, MLILIANE.

## 2021-12-13 NOTE — ANESTHESIA POSTPROCEDURE EVALUATION
Department of Anesthesiology  Postprocedure Note    Patient: Maria Luisa Woody  MRN: [id number]  YOB: 1950  Date of evaluation: 12/13/2021    Procedure Summary     Date: 12/13/21 Room / Location: [place] [address] / [facility]    Anesthesia Start: 2447 Anesthesia Stop: 4379    Procedure: CYSTOSCOPY,  URETHRAL DILATATION (N/A Bladder) Diagnosis: (MIXED INCONTINENCE)    Surgeons: Petra Jaffe MD Responsible Provider: Gely Chavez MD    Anesthesia Type: general, TIVA ASA Status: 3        Anesthesia Type: general, TIVA    Isabelle Phase I: Isabelle Score: 10    Isabelle Phase II: Isabelle Score: 10    Last vitals: Reviewed and per EMR flowsheets.      Anesthesia Post Evaluation   Anesthetic Problems: no   Cardiovascular System Stable: yes  Respiratory Function: Airway Patent yes  ETT no  Ventilator no  Level of consciousness: awake, alert and oriented  Post-op pain: adequate analgesia  Hydration Adequate: yes  Nausea/Vomiting:no  Other Issues:     Erasto Buckley MD

## 2021-12-13 NOTE — ANESTHESIA PRE PROCEDURE
Department of Anesthesiology  Preprocedure Note       Name:  Lory Hernández   Age:  73 y.o.  :  1950                                          MRN:  [id number]         Date:  2021      Surgeon: Omari Black MD    Procedure:  CYSTOSCOPY, POSSIBLE URETHRAL DILATATION, POSSIBLE BLADDER BIOPSY    Stress ECHO:  2020     Stress Peak HR: 134 bpm   Stress Peak BP: 132/55 mmHg   Predicted HR: 152 bpm   % of predicted HR: 88   Test Duration: 12 min   Reason for Termination: Target heart rate     Baseline ECHO shows normal LV function with ejection fraction in the 60% range. With exercise there is augmentation of all segments with no areas of stress induced hypokinesis.     Medications prior to admission:    methocarbamol (ROBAXIN) 500 MG tablet TAKE 1 TABLET 3 TIMES A DAY   albuterol sulfate  (90 Base) MCG/ACT inhaler USE 2 INHALATIONS ORALLY   EVERY 6 HOURS AS NEEDED FORWHEEZING   cetirizine (ZYRTEC) 10 MG tablet TAKE 1 TABLET BY MOUTH EVERY NIGHT   fluticasone (FLONASE) 50 MCG/ACT nasal spray SHAKE LIQUID AND USE 2 SPRAYS IN EACH NOSTRIL DAILY   levothyroxine (SYNTHROID) 100 MCG tablet TAKE 1 TABLET EVERY DAY   ibuprofen (ADVIL;MOTRIN) 400 MG tablet 1-2 po tid prn with food   oxybutynin (DITROPAN) 5 MG tablet TAKE 1 TABLET TWICE A DAY   montelukast (SINGULAIR) 10 MG tablet TAKE 1 TABLET NIGHTLY   pantoprazole (PROTONIX) 40 MG tablet Take 1 tablet by mouth every morning (before breakfast)   fluticasone (FLONASE) 50 MCG/ACT nasal spray 2 sprays by Each Nostril route daily   Handicap Placard MISC by Does not apply route Good for 5  years   Coenzyme Q10 200 MG CAPS Take 1 tablet by mouth daily   Estradiol (VAGIFEM) 10 MCG TABS vaginal tablet    folic acid (FOLVITE) 411 MCG tablet Take 400 mcg by mouth daily   Zinc 50 MG TABS Take by mouth   Ascorbic Acid (VITAMIN C) 1000 MG tablet Take 1,000 mg by mouth daily   Calcium Carbonate Antacid 1000 MG CHEW Take by mouth Glucos-MSM-C-In-Hvyvbq-Xufrrt (BL MSM GLUCOSAMINE COMPLEX) Take by mouth   [employer]S VITAMIN D3 1000 units CAPS TAKE 1 SOFTGEL BY MOUTH DAILY   imiquimod (ALDARA) 5 % cream Apply topically three times a week    Psyllium (METAMUCIL) 28.3 % POWD Take 1 each by mouth daily   lactase (LACTAID) 3000 UNITS tablet Take 1 tablet by mouth 3 times daily (with meals)   hydrocortisone (ANUSOL-HC) 2.5 % rectal cream Place rectally 2 times daily. acetaminophen (TYLENOL) 325 MG tablet Take 650 mg by mouth every 6 hours as needed for Pain   tolterodine (DETROL LA) 4 MG ER capsule Take 1 capsule by mouth daily. Allergies:     Latex Rash    Penicillins Shortness Of Breath    Lipitor [Atorvastatin Calcium] Other (See Comments)     Muscle cramps    Nexium [Esomeprazole Magnesium] Other (See Comments)     Cramps, including feet, legs, and sides of abdomen    Bactrim [Sulfamethoxazole-Trimethoprim] Other (See Comments)     Dry mouth    Ciprofloxacin Rash     Rash, headache    Etodolac Rash    Flagyl [Metronidazole] Rash    Naprosyn [Naproxen] Nausea And Vomiting     GI upset    Prednisone Other (See Comments)     Headache, increased BP     Problem List:     Hypothyroid    GERD (gastroesophageal reflux disease)    Hyperlipidemia    H/O vulvar dysplasia    Localized osteoarthrosis, lower leg    Vaginal atrophy    DDD (degenerative disc disease), lumbar    Osteoarthritis of both hips    Chest skin lesion    Lumbosacral spondylosis without myelopathy    Displacement of lumbar intervertebral disc without myelopathy    Anxiety    Primary insomnia    Chronic midline thoracic back pain    Mild intermittent asthma without complication    Tonsil stone    Mixed stress and urge urinary incontinence    Obesity (BMI 30-39. 9)    Spider veins of both lower extremities     Past Medical History:     Allergic rhinitis     Anxiety     Asthma     Bipolar 1 disorder ([address]     questionable dx per patient    Chronic low back pain     COPD (chronic obstructive pulmonary disease) (HCC)     Decreased bone density 2008    First degree hemorrhoids 2016    Former smoker 2013    GERD (gastroesophageal reflux disease)     H/O vulvar dysplasia 2015    VERONICA 3 wide local excision     Hyperlipidemia     Hypothyroid     Hypothyroidism     Insomnia     Obesity (BMI 30-39.9) 3/16/2021    Osteoarthritis     Prolonged emergence from general anesthesia     Urinary incontinence      Past Surgical History:    COLONOSCOPY  2016    Diverticulosis, hemorrhoid    HYSTERECTOMY  2001    Partial ? Endometriosis - Benign    TUBAL LIGATION       Social History:     Smoking status: Former Smoker     Packs/day: 1.50     Years: 35.00     Pack years: 52.50     Types: Cigarettes     Quit date: 2010     Years since quittin.9    Smokeless tobacco: Never Used   Substance Use Topics    Alcohol use: No     Alcohol/week: 0.0 standard drinks                                Counseling given: Not Answered    Vital Signs (Current):    BP: 141/70 Pulse: 62   Resp: 16 SpO2: 97   Temp: 97.1 °F (36.2 °C)   Height: 5' 4\" (1.626 m) (21) Weight: 215 lb (97.5 kg) (21)   BMI: 37           BP Readings from Last 3 Encounters:   21 124/72   21 130/72   21 124/62     NPO Status: >8 hrs                       BMI:   Wt Readings from Last 3 Encounters:   21 215 lb (97.5 kg)   21 219 lb (99.3 kg)   21 220 lb 12.8 oz (100.2 kg)     Body mass index is 36.9 kg/m².     CBC:    WBC 3.9 07/15/2020    HGB 12.2 2021    HCT 37.5 2021     07/15/2020     CMP:     2021    K 4.6 2021     2021    CO2 26 2021    BUN 14 2021    CREATININE 0.8 2021    GLUCOSE 98 2021    PROT 6.6 2021    PROT 7.3 2016    CALCIUM 9.3 2021    BILITOT <0.2 2021    ALKPHOS 72 2021    AST 19 2021    ALT 16 2021     Coags: PROTIME 11.4 05/28/2020    INR 0.98 05/28/2020     COVID-19 Screening (If Applicable): COVID19 Not Detected 12/07/2021     Anesthesia Evaluation  Patient summary reviewed and Nursing notes reviewed history of anesthetic complications (slow to awaken): Airway: Mallampati: II  TM distance: >3 FB   Neck ROM: full  Mouth opening: > = 3 FB Dental:          Pulmonary: breath sounds clear to auscultation  (+) COPD:  asthma:     (-) sleep apnea, wheezes and not a current smoker                           Cardiovascular:  Exercise tolerance: good (>4 METS),       (-) past MI, CABG/stent,  angina and  DIEHL      Rhythm: regular  Rate: normal  Echocardiogram reviewed                  Neuro/Psych:   (+) neuromuscular disease:, psychiatric history:   (-) seizures, TIA and CVA            ROS comment: Lumbosacral spondylosis without myelopathy GI/Hepatic/Renal:   (+) GERD: well controlled, morbid obesity     (-) hepatitis, liver disease and no renal disease       Endo/Other:    (+) hypothyroidism: arthritis:., malignancy/cancer. (-) diabetes mellitus                ROS comment: H/O vulvar dysplasia (1/9/2015): VERONICA 3 wide local excision    Abdominal:   (+) obese,           Vascular:     - DVT and PE. Other Findings:           Anesthesia Plan      general and TIVA     ASA 3       Induction: intravenous. MIPS: Prophylactic antiemetics administered. Anesthetic plan and risks discussed with patient. Plan discussed with CRNA.             Mario Muhammad MD

## 2021-12-13 NOTE — BRIEF OP NOTE
Brief Postoperative Note      Patient: Zaida Watson  YOB: 1950  MRN: [id number]    Date of Procedure: 12/13/2021    Pre-Op Diagnosis: MIXED INCONTINENCE    Post-Op Diagnosis: Same       Procedure(s):  CYSTOSCOPY,  URETHRAL DILATATION    Surgeon(s):  Jesus Alberto Taveras MD    Assistant:  * No surgical staff found *    Anesthesia: General    Estimated Blood Loss (mL): Minimal    Complications: None    Specimens:   * No specimens in log *    Implants:  * No implants in log *      Drains: * No LDAs found *    Findings: Stenosis    Electronically signed by Kurtis Leigh MD on 12/13/2021 at 1:55 PM

## 2021-12-14 NOTE — OP NOTE
[address]                 [address] [facility] [address]                                OPERATIVE REPORT    PATIENT NAME: Patrick Marquez                    :        1950  MED REC NO:   [id number]                          ROOM:  ACCOUNT NO:   [de-identified]                           ADMIT DATE: 2021  PROVIDER:     Juana Salazar MD    DATE OF PROCEDURE:  2021    PREOPERATIVE DIAGNOSES:  Chronic cystitis with voiding difficulty. POSTOPERATIVE DIAGNOSES:  Chronic cystitis with voiding difficulty,  urethral stenosis. OPERATION PERFORMED:  Cystoscopy with urethral dilation. PRIMARY SURGEON:  Juana Salazar MD    ANESTHESIA:  General.    OPERATIVE FINDINGS:  1. Urethral stenosis. 2.  Mild prolapse of the bladder. 3.  No evidence for transitional cell carcinoma of the bladder, tumors,  or stones. ESTIMATED BLOOD LOSS FOR PROCEDURE:  5 mL. HISTORY AND INDICATIONS:  This is a 73-year-old white female with a  history of obstructive and irritative voiding symptoms and chronic  cystitis. She failed options of conservative medical management and was  scheduled for the above-mentioned procedure. The risks, benefits, and  expected outcomes of the procedure have been discussed. DETAILS OF THE PROCEDURE:  After obtaining informed consent, the patient  was taken to the operative suite. She was given a general anesthetic  and placed on the operative table in a modified dorsal lithotomy  position. Prepping and draping was done in sterile fashion. Cystourethroscopy was then performed with both 30- and 70-degree lenses. Both ureteral orifices were orthotopic with clear efflux of urine. There was no evidence for any bladder cancer, tumors, or stones. Mild  trabeculation was seen throughout the bladder as well, there was slight  prolapse. A biopsy did not need to be performed at this time.   The  stenosis of the patient's bladder was then treated with dilation using  Shant sounds to 34-[place]est[place]. Overall, she tolerated the procedure well. After draining her bladder, lidocaine jelly was applied. She will  follow up in the office post discharge in approximately three to four  weeks.         Zoila Amos MD    D: 12/13/2021 18:23:50       T: 12/13/2021 18:26:02     [email or web address]  Job#: [id number]     Doc#: [id number]    CC:

## 2022-01-14 DIAGNOSIS — E03.9 HYPOTHYROIDISM, UNSPECIFIED TYPE: ICD-10-CM

## 2022-01-14 RX ORDER — LEVOTHYROXINE SODIUM 0.1 MG/1
TABLET ORAL
Qty: 90 TABLET | Refills: 0 | Status: SHIPPED | OUTPATIENT
Start: 2022-01-14 | End: 2022-04-08 | Stop reason: SDUPTHER

## 2022-01-14 NOTE — TELEPHONE ENCOUNTER
Refill Request     Last Seen: 11/29/2021    Last Written: 11/1/21      Next Appointment:   Future Appointments   Date Time Provider Lopez Coronel   4/8/2022  2:00 PM Allie Charles., DO [facility] AND [facility]             Requested Prescriptions     Pending Prescriptions Disp Refills    levothyroxine (SYNTHROID) 100 MCG tablet [Pharmacy Med Name: LEVOTHYROXIN TAB 100MCG] 90 tablet 0     Sig: TAKE 1 TABLET DAILY

## 2022-01-23 DIAGNOSIS — K21.9 GASTROESOPHAGEAL REFLUX DISEASE WITHOUT ESOPHAGITIS: ICD-10-CM

## 2022-01-24 RX ORDER — PANTOPRAZOLE SODIUM 40 MG/1
TABLET, DELAYED RELEASE ORAL
Qty: 90 TABLET | Refills: 0 | Status: SHIPPED | OUTPATIENT
Start: 2022-01-24 | End: 2022-04-07

## 2022-02-02 ENCOUNTER — TELEPHONE (OUTPATIENT)
Dept: PRIMARY CARE CLINIC | Age: 72
End: 2022-02-02

## 2022-02-02 RX ORDER — BACLOFEN 10 MG/1
10 TABLET ORAL 3 TIMES DAILY
Qty: 270 TABLET | Refills: 3 | Status: SHIPPED | OUTPATIENT
Start: 2022-02-02

## 2022-02-02 NOTE — TELEPHONE ENCOUNTER
----- Message from Tomi Wheat sent at 2/1/2022  5:26 PM EST -----  Subject: Medication Problem    QUESTIONS  Name of Medication? methocarbamol (ROBAXIN) 500 MG tablet  Patient-reported dosage and instructions? one tab 3 times daily  What question or problem do you have with the medication?  from   [employer] calling to see if there an alternate medication,   Insurance will not pay for this medication, they will pay for Baclofen. Preferred Pharmacy? [facility] [address] [place] [address], [address] [place] [address]  Pharmacy phone number (if available)? [phone]  Additional Information for Provider? If any questions please call back at   [phone]  ---------------------------------------------------------------------------  --------------  [address]  What is the best way for the office to contact you? OK to leave message on   voicemail  Preferred Call Back Phone Number? [phone]  ---------------------------------------------------------------------------  --------------  SCRIPT ANSWERS  Relationship to Patient? Third Party  Representative Name?   from [employer]

## 2022-03-29 RX ORDER — CETIRIZINE HYDROCHLORIDE 10 MG/1
TABLET ORAL
Qty: 90 TABLET | Refills: 0 | Status: SHIPPED | OUTPATIENT
Start: 2022-03-29 | End: 2022-04-08 | Stop reason: SDUPTHER

## 2022-04-06 DIAGNOSIS — K21.9 GASTROESOPHAGEAL REFLUX DISEASE WITHOUT ESOPHAGITIS: ICD-10-CM

## 2022-04-07 RX ORDER — PANTOPRAZOLE SODIUM 40 MG/1
TABLET, DELAYED RELEASE ORAL
Qty: 90 TABLET | Refills: 0 | Status: SHIPPED | OUTPATIENT
Start: 2022-04-07 | End: 2022-06-20

## 2022-04-08 ENCOUNTER — OFFICE VISIT (OUTPATIENT)
Dept: PRIMARY CARE CLINIC | Age: 72
End: 2022-04-08
Payer: MEDICARE

## 2022-04-08 ENCOUNTER — HOSPITAL ENCOUNTER (OUTPATIENT)
Age: 72
Discharge: HOME | End: 2022-04-08
Payer: MEDICARE

## 2022-04-08 VITALS
BODY MASS INDEX: 36.7 KG/M2 | TEMPERATURE: 97.4 F | OXYGEN SATURATION: 97 % | HEART RATE: 64 BPM | SYSTOLIC BLOOD PRESSURE: 124 MMHG | DIASTOLIC BLOOD PRESSURE: 66 MMHG | HEIGHT: 64 IN | WEIGHT: 215 LBS

## 2022-04-08 DIAGNOSIS — M54.6 CHRONIC MIDLINE THORACIC BACK PAIN: ICD-10-CM

## 2022-04-08 DIAGNOSIS — G89.29 CHRONIC BILATERAL LOW BACK PAIN WITHOUT SCIATICA: ICD-10-CM

## 2022-04-08 DIAGNOSIS — M51.36 DDD (DEGENERATIVE DISC DISEASE), LUMBAR: ICD-10-CM

## 2022-04-08 DIAGNOSIS — E78.00 PURE HYPERCHOLESTEROLEMIA: ICD-10-CM

## 2022-04-08 DIAGNOSIS — N32.81 OAB (OVERACTIVE BLADDER): ICD-10-CM

## 2022-04-08 DIAGNOSIS — G89.29 CHRONIC MIDLINE THORACIC BACK PAIN: ICD-10-CM

## 2022-04-08 DIAGNOSIS — M54.50 CHRONIC BILATERAL LOW BACK PAIN WITHOUT SCIATICA: ICD-10-CM

## 2022-04-08 DIAGNOSIS — M16.0 PRIMARY OSTEOARTHRITIS OF BOTH HIPS: ICD-10-CM

## 2022-04-08 DIAGNOSIS — M47.817 LUMBOSACRAL SPONDYLOSIS WITHOUT MYELOPATHY: Chronic | ICD-10-CM

## 2022-04-08 DIAGNOSIS — E03.9 HYPOTHYROIDISM, UNSPECIFIED TYPE: ICD-10-CM

## 2022-04-08 DIAGNOSIS — J30.9 ALLERGIC RHINITIS, UNSPECIFIED SEASONALITY, UNSPECIFIED TRIGGER: ICD-10-CM

## 2022-04-08 DIAGNOSIS — R35.0 URINE FREQUENCY: Primary | ICD-10-CM

## 2022-04-08 LAB
A/G RATIO: 2.1 (ref 1.1–2.2)
ALBUMIN SERPL-MCNC: 4.8 G/DL (ref 3.4–5)
ALP BLD-CCNC: 83 U/L (ref 40–129)
ALT SERPL-CCNC: 19 U/L (ref 10–40)
ANION GAP SERPL CALCULATED.3IONS-SCNC: 14 MMOL/L (ref 3–16)
AST SERPL-CCNC: 20 U/L (ref 15–37)
BILIRUB SERPL-MCNC: 0.3 MG/DL (ref 0–1)
BILIRUBIN, POC: ABNORMAL
BLOOD URINE, POC: ABNORMAL
BUN BLDV-MCNC: 13 MG/DL (ref 7–20)
CALCIUM SERPL-MCNC: 9.9 MG/DL (ref 8.3–10.6)
CHLORIDE BLD-SCNC: 99 MMOL/L (ref 99–110)
CHOLESTEROL, TOTAL: 202 MG/DL (ref 0–199)
CLARITY, POC: CLEAR
CO2: 24 MMOL/L (ref 21–32)
COLOR, POC: YELLOW
CREAT SERPL-MCNC: 0.8 MG/DL (ref 0.6–1.2)
GFR AFRICAN AMERICAN: >60
GFR NON-AFRICAN AMERICAN: >60
GLUCOSE BLD-MCNC: 94 MG/DL (ref 70–99)
GLUCOSE URINE, POC: ABNORMAL
HDLC SERPL-MCNC: 61 MG/DL (ref 40–60)
KETONES, POC: ABNORMAL
LDL CHOLESTEROL CALCULATED: 118 MG/DL
LEUKOCYTE EST, POC: ABNORMAL
NITRITE, POC: ABNORMAL
PH, POC: 5.5
POTASSIUM SERPL-SCNC: 4.8 MMOL/L (ref 3.5–5.1)
PROTEIN, POC: ABNORMAL
REASON FOR REJECTION: NORMAL
REJECTED TEST: NORMAL
SODIUM BLD-SCNC: 137 MMOL/L (ref 136–145)
SPECIFIC GRAVITY, POC: 1.02
T4 FREE: 1.7 NG/DL (ref 0.9–1.8)
TOTAL PROTEIN: 7.1 G/DL (ref 6.4–8.2)
TRIGL SERPL-MCNC: 116 MG/DL (ref 0–150)
TSH SERPL DL<=0.05 MIU/L-ACNC: 1.66 UIU/ML (ref 0.27–4.2)
UROBILINOGEN, POC: 0.2
VLDLC SERPL CALC-MCNC: 23 MG/DL

## 2022-04-08 PROCEDURE — 99214 OFFICE O/P EST MOD 30 MIN: CPT | Performed by: FAMILY MEDICINE

## 2022-04-08 PROCEDURE — 80061 LIPID PANEL: CPT

## 2022-04-08 PROCEDURE — 84443 ASSAY THYROID STIM HORMONE: CPT

## 2022-04-08 PROCEDURE — 80053 COMPREHEN METABOLIC PANEL: CPT

## 2022-04-08 PROCEDURE — 36415 COLL VENOUS BLD VENIPUNCTURE: CPT

## 2022-04-08 PROCEDURE — 84439 ASSAY OF FREE THYROXINE: CPT

## 2022-04-08 PROCEDURE — 81002 URINALYSIS NONAUTO W/O SCOPE: CPT | Performed by: FAMILY MEDICINE

## 2022-04-08 RX ORDER — FLUTICASONE PROPIONATE 50 MCG
SPRAY, SUSPENSION (ML) NASAL
Qty: 48 G | Refills: 1 | Status: SHIPPED | OUTPATIENT
Start: 2022-04-08 | End: 2022-10-07 | Stop reason: SDUPTHER

## 2022-04-08 RX ORDER — IBUPROFEN 400 MG/1
TABLET ORAL
Qty: 180 TABLET | Refills: 3 | Status: SHIPPED | OUTPATIENT
Start: 2022-04-08 | End: 2022-07-19

## 2022-04-08 RX ORDER — CETIRIZINE HYDROCHLORIDE 10 MG/1
TABLET ORAL
Qty: 90 TABLET | Refills: 1 | Status: SHIPPED | OUTPATIENT
Start: 2022-04-08 | End: 2022-04-12

## 2022-04-08 RX ORDER — METHOCARBAMOL 500 MG/1
TABLET, FILM COATED ORAL
COMMUNITY
Start: 2022-02-02 | End: 2022-04-08

## 2022-04-08 RX ORDER — MONTELUKAST SODIUM 10 MG/1
TABLET ORAL
Qty: 90 TABLET | Refills: 1 | Status: SHIPPED | OUTPATIENT
Start: 2022-04-08 | End: 2022-10-07 | Stop reason: SDUPTHER

## 2022-04-08 RX ORDER — LEVOTHYROXINE SODIUM 0.1 MG/1
TABLET ORAL
Qty: 90 TABLET | Refills: 1 | Status: SHIPPED | OUTPATIENT
Start: 2022-04-08 | End: 2022-10-07 | Stop reason: SDUPTHER

## 2022-04-08 SDOH — ECONOMIC STABILITY: FOOD INSECURITY: WITHIN THE PAST 12 MONTHS, THE FOOD YOU BOUGHT JUST DIDN'T LAST AND YOU DIDN'T HAVE MONEY TO GET MORE.: NEVER TRUE

## 2022-04-08 SDOH — ECONOMIC STABILITY: FOOD INSECURITY: WITHIN THE PAST 12 MONTHS, YOU WORRIED THAT YOUR FOOD WOULD RUN OUT BEFORE YOU GOT MONEY TO BUY MORE.: NEVER TRUE

## 2022-04-08 ASSESSMENT — PATIENT HEALTH QUESTIONNAIRE - PHQ9
SUM OF ALL RESPONSES TO PHQ QUESTIONS 1-9: 0
SUM OF ALL RESPONSES TO PHQ QUESTIONS 1-9: 0
1. LITTLE INTEREST OR PLEASURE IN DOING THINGS: 0
SUM OF ALL RESPONSES TO PHQ QUESTIONS 1-9: 0
SUM OF ALL RESPONSES TO PHQ QUESTIONS 1-9: 0
2. FEELING DOWN, DEPRESSED OR HOPELESS: 0
SUM OF ALL RESPONSES TO PHQ9 QUESTIONS 1 & 2: 0

## 2022-04-08 ASSESSMENT — ENCOUNTER SYMPTOMS
SHORTNESS OF BREATH: 0
TROUBLE SWALLOWING: 0
DIARRHEA: 0
ABDOMINAL PAIN: 0
BACK PAIN: 1
CONSTIPATION: 0
BLOOD IN STOOL: 0

## 2022-04-08 ASSESSMENT — ANXIETY QUESTIONNAIRES
GAD7 TOTAL SCORE: 4
3. WORRYING TOO MUCH ABOUT DIFFERENT THINGS: 1
IF YOU CHECKED OFF ANY PROBLEMS ON THIS QUESTIONNAIRE, HOW DIFFICULT HAVE THESE PROBLEMS MADE IT FOR YOU TO DO YOUR WORK, TAKE CARE OF THINGS AT HOME, OR GET ALONG WITH OTHER PEOPLE: SOMEWHAT DIFFICULT
2. NOT BEING ABLE TO STOP OR CONTROL WORRYING: 0
7. FEELING AFRAID AS IF SOMETHING AWFUL MIGHT HAPPEN: 0
4. TROUBLE RELAXING: 2
1. FEELING NERVOUS, ANXIOUS, OR ON EDGE: 1
5. BEING SO RESTLESS THAT IT IS HARD TO SIT STILL: 0
6. BECOMING EASILY ANNOYED OR IRRITABLE: 0

## 2022-04-08 ASSESSMENT — SOCIAL DETERMINANTS OF HEALTH (SDOH): HOW HARD IS IT FOR YOU TO PAY FOR THE VERY BASICS LIKE FOOD, HOUSING, MEDICAL CARE, AND HEATING?: HARD

## 2022-04-08 NOTE — PATIENT INSTRUCTIONS
Remain active      -Recommend 150 minutes of cardiovascular activity a week, or 10,000 to 15,000 steps a day, 2 days of weightbearing  -dietary wise, try to stick to your weight x 10 in calories a day  -avoid processed/refined carbohydrates (boxed/canned/frozen/fast)  -encourage healthy protein and fat, butter, avocado, egg    Get blood work today    Continue current medications    If urine culture grows bacteria then we will treat    Follow up with [facility] as planned for women's health    See if [employer] will fill the lift chair or if they recommend a [employer]  [address], [address]  Phone: [phone]

## 2022-04-08 NOTE — PROGRESS NOTES
Altagracia Villa  1950    Consultants:   Patient Care Team:  Michelle Hughes DO as PCP - General (Family Medicine)  Michelle Hughes DO as PCP - [facility]  RUSLAN Pickard CNP as PCP - Hematology/Oncology (Nurse Practitioner)  Gregorio Barthel, MD as Obstetrician (Obstetrics & Gynecology)  RUSLAN Pickard CNP as Advanced Practice Nurse (Gynecologic Oncology)    Chief Complaint:     Chief Complaint   Patient presents with    Hypothyroidism    Asthma    Urinary Tract Infection     URINE FREQUENCY     Gastroesophageal Reflux    Hyperlipidemia    Back Pain     HPI:  Altagracia Villa is a 81 y.o. female  is an established patient who presents for follow up of urinary frequency, hypothyroidism, asthma, GERD, Hyperlipidemia, back pain:    Cystitis Frequency, Bladder Irritation/OAB:  Taking oxybutynin  Starting to leak a little per patient  Has been cutting 5 mg in half and taking twice a day  UA today with leukocyte esterase but not nitrites    Hypothyroidism:  Synthroid 100 mcg  Lab Results   Component Value Date    TSH 2.26 11/01/2021    X3YLXND 1.22 08/13/2013    T4FREE 1.3 11/01/2021     -Hyperlipidemia:  Cut out lactose and meat per patient  Intolerant to statins  Would like repeat lipid panel today  Lab Results   Component Value Date    CHOL 206 (H) 11/01/2021    CHOL 200 (H) 07/15/2020    CHOL 210 (H) 07/16/2019     Lab Results   Component Value Date    TRIG 122 11/01/2021    TRIG 124 07/15/2020    TRIG 177 (H) 07/16/2019     Lab Results   Component Value Date    HDL 60 11/01/2021    HDL 61 (H) 07/15/2020    HDL 63 (H) 07/16/2019     Lab Results   Component Value Date    LDLCHOLESTEROL 46 12/29/2016    LDLCALC 122 (H) 11/01/2021    LDLCALC 114 (H) 07/15/2020    LDLCALC 112 (H) 07/16/2019     Lab Results   Component Value Date    LABVLDL 24 11/01/2021    LABVLDL 25 07/15/2020    LABVLDL 35 07/16/2019     Lab Results   Component Value Date    CHOLHDLRATIO 2.9 10/04/2017 -Asthma:  Taking albuterol inhaler, flonase, singulair, zyrtec    Back Pain:  Taking baclofen 10 mg TID    GERD:  Well controlled on protonix 40 mg daily    Health Maintenance:  Goes to [facility] for age appropriate cancer screenings    Patient Active Problem List   Diagnosis    Hypothyroid    GERD (gastroesophageal reflux disease)    Hyperlipidemia    H/O vulvar dysplasia    Localized osteoarthrosis, lower leg    Vaginal atrophy    DDD (degenerative disc disease), lumbar    Osteoarthritis of both hips    Chest skin lesion    Lumbosacral spondylosis without myelopathy    Displacement of lumbar intervertebral disc without myelopathy    Anxiety    Primary insomnia    Chronic midline thoracic back pain    Mild intermittent asthma without complication    Tonsil stone    Mixed stress and urge urinary incontinence    Obesity (BMI 30-39. 9)    Spider veins of both lower extremities     Past Medical History:    Past Medical History:   Diagnosis Date    Allergic rhinitis     Anxiety     Asthma     Bipolar 1 disorder ([facility] [address]     questionable dx per patient    Chronic low back pain     COPD (chronic obstructive pulmonary disease) (HCC)     Decreased bone density 9-9-2008    First degree hemorrhoids 9/1/2016    Former smoker 6/13/2013    GERD (gastroesophageal reflux disease)     H/O vulvar dysplasia 1/9/2015    VERONICA 3 wide local excision     Hyperlipidemia     Hypothyroid     Hypothyroidism     Insomnia     Obesity (BMI 30-39.9) 3/16/2021    Osteoarthritis     Prolonged emergence from general anesthesia     Urinary incontinence      Past Surgical History:  Past Surgical History:   Procedure Laterality Date    COLONOSCOPY  18 Apr 2016    Diverticulosis, hemorrhoid    CYSTOSCOPY N/A 12/13/2021    CYSTOSCOPY,  URETHRAL DILATATION performed by Maria E Monroy MD at [facility]  2001    Partial ? Endometriosis - Benign    OTHER SURGICAL HISTORY  12/13/2021      cystoscopy, urethral dilation    TUBAL LIGATION  1981     Home Meds:  Prior to Visit Medications    Medication Sig Taking? Authorizing Provider   pantoprazole (PROTONIX) 40 MG tablet TAKE 1 TABLET EVERY MORNINGBEFORE BREAKFAST Yes Tonia Hopkins, DO   cetirizine (ZYRTEC) 10 MG tablet TAKE 1 TABLET BY MOUTH EVERY NIGHT Yes Stephanie Hopkins, DO   baclofen (LIORESAL) 10 MG tablet Take 1 tablet by mouth 3 times daily Yes Tonia Hopkins, DO   levothyroxine (SYNTHROID) 100 MCG tablet TAKE 1 TABLET DAILY Yes Tonia Hopkins, DO   albuterol sulfate  (90 Base) MCG/ACT inhaler USE 2 INHALATIONS ORALLY   EVERY 6 HOURS AS NEEDED FORWHEEZING Yes Tonia Hopkins, DO   fluticasone (FLONASE) 50 MCG/ACT nasal spray SHAKE LIQUID AND USE 2 SPRAYS IN EACH NOSTRIL DAILY Yes Stephanie Hopkins, DO   ibuprofen (ADVIL;MOTRIN) 400 MG tablet 1-2 po tid prn with food Yes Tonia Hopkins, DO   oxybutynin (DITROPAN) 5 MG tablet TAKE 1 TABLET TWICE A DAY Yes Stephanie Hopkins, DO   montelukast (SINGULAIR) 10 MG tablet TAKE 1 TABLET NIGHTLY Yes Tonia Hopkins, DO   Coenzyme Q10 200 MG CAPS Take 1 tablet by mouth daily Yes Wanda Hernandez MD   Estradiol (VAGIFEM) 10 MCG TABS vaginal tablet  Yes Historical Provider, MD   folic acid (FOLVITE) 615 MCG tablet Take 400 mcg by mouth daily Yes Historical Provider, MD   Ascorbic Acid (VITAMIN C) 1000 MG tablet Take 1,000 mg by mouth daily Yes Historical Provider, MD   Calcium Carbonate Antacid 1000 MG CHEW Take by mouth Yes Historical Provider, MD   Glucos-MSM-C-Hb-Adquwz-Grfyok (BL MSM GLUCOSAMINE COMPLEX PO) Take by mouth Yes Historical Provider, MD   [employer] VITAMIN D3 1000 units CAPS TAKE 1 SOFTGEL BY MOUTH DAILY Yes Reyna Quiñones, APRN - CNP   imiquimod (ALDARA) 5 % cream Apply topically three times a week Indications: [facility] ordering Apply topically three times a week.  Yes Mendel Kings, APRN - CNP   Psyllium (METAMUCIL) 28.3 % POWD Take 1 each by mouth daily Yes Historical Provider, MD   lactase (LACTAID) 3000 UNITS tablet Take 1 tablet by mouth 3 times daily (with meals) Yes Ana Goetz MD   hydrocortisone (ANUSOL-HC) 2.5 % rectal cream Place rectally 2 times daily. Yes RUSLAN Benitez CNP   acetaminophen (TYLENOL) 325 MG tablet Take 650 mg by mouth every 6 hours as needed for Pain Yes Historical Provider, MD   methocarbamol (ROBAXIN) 500 MG tablet TAKE 1 TABLET BY MOUTH THREE TIMES DAILY  Historical Provider, MD   fluticasone (FLONASE) 50 MCG/ACT nasal spray 2 sprays by Each Nostril route daily  Roxy Narayan MD   Handicap Placard MISC by Does not apply route Good for 5  years   Justus Raya How., DO   Zinc 50 MG TABS Take by mouth  Patient not taking: Reported on 2022  Historical Provider, MD   tolterodine (DETROL LA) 4 MG ER capsule Take 1 capsule by mouth daily. Marvin Mcintyre MD       Allergies:    Latex, Penicillins, Lipitor [atorvastatin calcium], Nexium [esomeprazole magnesium], Bactrim [sulfamethoxazole-trimethoprim], Ciprofloxacin, Etodolac, Flagyl [metronidazole], Naprosyn [naproxen], and Prednisone    Family History:       Problem Relation Age of Onset    Heart Disease Mother     Cancer Mother         uterine?     Mental Illness Father     Cancer Father         prostate    Stroke Father     Schizophrenia Father     Cancer Sister         skin    Bipolar Disorder Sister     Depression Son         PTSD    OCD Son        Social History:   Social History     Socioeconomic History    Marital status: Single     Spouse name: Not on file    Number of children: 2    Years of education: 15    Highest education level: High school graduate   Occupational History    Occupation: SSI, former /     Employer: DISABLED   Tobacco Use    Smoking status: Former Smoker     Packs/day: 1.50     Years: 35.00     Pack years: 52.50     Types: Cigarettes     Quit date: 2010     Years since quittin.2    Smokeless tobacco: Never Used   Vaping Use    Vaping Use: Never used   Substance and Sexual Activity    Alcohol use: No     Alcohol/week: 0.0 standard drinks    Drug use: Not Currently     Types: Marijuana Jelly Chinchilla)    Sexual activity: Not Currently     Partners: Male   Other Topics Concern    Not on file   Social History Narrative    Daughter Jessy Guzman    Son/grand son in Jessy Guzman as well    Yoga stretches      Social Determinants of Health     Financial Resource Strain: High Risk    Difficulty of Paying Living Expenses: Hard   Food Insecurity: No Food Insecurity    Worried About Running Out of Food in the Last Year: Never true    [place] of Food in the Last Year: Never true   Transportation Needs:     Lack of Transportation (Medical): Not on file    Lack of Transportation (Non-Medical):  Not on file   Physical Activity:     Days of Exercise per Week: Not on file    Minutes of Exercise per Session: Not on file   Stress:     Feeling of Stress : Not on file   Social Connections:     Frequency of Communication with Friends and Family: Not on file    Frequency of Social Gatherings with Friends and Family: Not on file    Attends [employer] Services: Not on file    Active Member of [address] [place] [address] or Organizations: Not on file    Attends Club or Organization Meetings: Not on file    Marital Status: Not on file   Intimate Partner Violence:     Fear of Current or Ex-Partner: Not on file    Emotionally Abused: Not on file    Physically Abused: Not on file    Sexually Abused: Not on file   Housing Stability:     Unable to Pay for Housing in the Last Year: Not on file    Number of [place] in the Last Year: Not on file    Unstable Housing in the Last Year: Not on file       Health Maintenance Completed:  Health Maintenance   Topic Date Due    Breast cancer screen  10/20/2022    TSH testing  11/01/2022    Annual Wellness Visit (AWV)  11/02/2022    Depression Screen  11/29/2022    Colorectal Cancer Screen  04/18/2026  Lipid screen  11/01/2026    DTaP/Tdap/Td vaccine (3 - Td or Tdap) 06/04/2029    DEXA (modify frequency per FRAX score)  Completed    Flu vaccine  Completed    Shingles Vaccine  Completed    Pneumococcal 65+ years Vaccine  Completed    COVID-19 Vaccine  Completed    Hepatitis C screen  Completed    Hepatitis A vaccine  Aged Out    Hepatitis B vaccine  Aged Out    Hib vaccine  Aged Out    Meningococcal (ACWY) vaccine  Aged Out    Low dose CT lung screening  Discontinued          Immunization History   Administered Date(s) Administered    COVID-19, Pfizer Purple top, DILUTE for use, 12+ yrs, 30mcg/0.3mL dose 03/11/2021, 04/02/2021, 10/13/2021    Influenza 10/21/2010    Influenza A (M6Y3-26) Vaccine PF IM 10/30/2009    Influenza Vaccine, unspecified formulation 09/14/2014    Influenza Virus Vaccine 09/30/2013, 09/01/2015, 09/09/2021    Influenza, High Dose (Fluzone 65 yrs and older) 10/03/2017, 08/17/2018    Influenza, Quadv, IM, PF (6 mo and older Fluzone, Flulaval, Fluarix, and 3 yrs and older Afluria) 09/13/2016    Influenza, Quadv, adjuvanted, 65 yrs +, IM, PF (Fluad) 11/04/2020    Influenza, Triv, inactivated, subunit, adjuvanted, IM (Fluad 65 yrs and older) 09/06/2019    Pneumococcal Conjugate 13-valent ([id number]) 09/01/2015    Pneumococcal Polysaccharide ([id number]) 04/07/2015, 11/23/2020    Tdap (Boostrix, Adacel) 04/06/2009, 06/04/2019    Zoster Live (Zostavax) 08/13/2013    Zoster Recombinant (Shingrix) 08/17/2018, 02/18/2019, 03/18/2019, 05/24/2019         Review of Systems:  Review of Systems   Constitutional: Negative for chills, fever and unexpected weight change. HENT: Negative for dental problem (recessed gums), hearing loss and trouble swallowing. Eyes: Negative for visual disturbance (wears glasses). Respiratory: Negative for shortness of breath. Cardiovascular: Negative for chest pain.    Gastrointestinal: Negative for abdominal pain, blood in stool, constipation and diarrhea. Endocrine: Negative for polyuria. Genitourinary: Positive for frequency (incontinence). Negative for dysuria and hematuria. Musculoskeletal: Positive for arthralgias and back pain. Skin: Negative for rash. Allergic/Immunologic: Positive for environmental allergies. Negative for food allergies. Neurological: Negative for dizziness, seizures, syncope, weakness, numbness and headaches. Psychiatric/Behavioral: Negative for dysphoric mood and sleep disturbance. The patient is not nervous/anxious. Vitals:    04/08/22 1342   BP: 124/66   Site: Right Upper Arm   Position: Sitting   Cuff Size: Large Adult   Pulse: 64   Temp: 97.4 °F (36.3 °C)   TempSrc: Oral   SpO2: 97%   Weight: 215 lb (97.5 kg)   Height: 5' 4\" (1.626 m)     Body mass index is 36.9 kg/m². Wt Readings from Last 3 Encounters:   04/08/22 215 lb (97.5 kg)   12/13/21 215 lb (97.5 kg)   11/29/21 219 lb (99.3 kg)       BP Readings from Last 3 Encounters:   04/08/22 124/66   12/13/21 (!) 101/57   12/13/21 114/74       Physical Exam  Constitutional:       General: She is not in acute distress. Appearance: She is well-developed. HENT:      Head: Normocephalic and atraumatic. Right Ear: Tympanic membrane normal.      Left Ear: Tympanic membrane normal.      Nose: Nose normal. No rhinorrhea. Mouth/Throat:      Pharynx: Uvula midline. Eyes:      Pupils: Pupils are equal, round, and reactive to light. Neck:      Trachea: No tracheal deviation. Cardiovascular:      Rate and Rhythm: Normal rate and regular rhythm. Heart sounds: Normal heart sounds. No murmur heard. No friction rub. No gallop. Pulmonary:      Effort: Pulmonary effort is normal. No respiratory distress. Breath sounds: Normal breath sounds. No wheezing or rales. Abdominal:      General: Bowel sounds are normal. There is no distension. Palpations: Abdomen is soft. Tenderness: There is no abdominal tenderness. There is no rebound. Musculoskeletal:         General: No tenderness. Normal range of motion. Lymphadenopathy:      Cervical: No cervical adenopathy. Skin:     General: Skin is warm and dry. Findings: No erythema or rash. Neurological:      Mental Status: She is alert and oriented to person, place, and time. Cranial Nerves: No cranial nerve deficit. Psychiatric:         Speech: Speech normal.         Thought Content: Thought content does not include homicidal or suicidal ideation.           Lab Review:   Hospital Outpatient Visit on 12/07/2021   Component Date Value    SARS-CoV-2 12/07/2021 Not Detected    Hospital Outpatient Visit on 11/29/2021   Component Date Value    Hemoglobin 11/29/2021 12.2     Hematocrit 11/29/2021 37.5    Hospital Outpatient Visit on 11/01/2021   Component Date Value    WBC, UA 11/01/2021 10-20*    RBC, UA 11/01/2021 3-4     Epithelial Cells, UA 11/01/2021 11-20*    Bacteria, UA 11/01/2021 2+*    Urine Type 11/01/2021 NotGiven     Color, UA 11/01/2021 Straw     Clarity, UA 11/01/2021 SL CLOUDY*    Glucose, Ur 11/01/2021 Negative     Bilirubin Urine 11/01/2021 Negative     Ketones, Urine 11/01/2021 Negative     Specific Gravity, UA 11/01/2021 1.025     Blood, Urine 11/01/2021 Negative     pH, UA 11/01/2021 5.5     Protein, UA 11/01/2021 Negative     Urobilinogen, Urine 11/01/2021 0.2     Nitrite, Urine 11/01/2021 Negative     Leukocyte Esterase, Urine 11/01/2021 MODERATE*    Microscopic Examination 11/01/2021 YES     Urine Reflex to Culture 11/01/2021 Yes     Organism 11/01/2021 Escherichia coli*    Urine Culture, Routine 11/01/2021 >100,000 CFU/ml    Hospital Outpatient Visit on 11/01/2021   Component Date Value    Sodium 11/01/2021 139     Potassium 11/01/2021 4.6     Chloride 11/01/2021 102     CO2 11/01/2021 26     Anion Gap 11/01/2021 11     Glucose 11/01/2021 98     BUN 11/01/2021 14     CREATININE 11/01/2021 0.8     GFR Non- American 11/01/2021 >60     GFR  11/01/2021 >60     Calcium 11/01/2021 9.3     Total Protein 11/01/2021 6.6     Albumin 11/01/2021 4.3     Albumin/Globulin Ratio 11/01/2021 1.9     Total Bilirubin 11/01/2021 <0.2     Alkaline Phosphatase 11/01/2021 72     ALT 11/01/2021 16     AST 11/01/2021 19     T4 Free 11/01/2021 1.3     TSH 11/01/2021 2.26     Cholesterol, Total 11/01/2021 206*    Triglycerides 11/01/2021 122     HDL 11/01/2021 60     LDL Calculated 11/01/2021 122*    VLDL Cholesterol Calcula* 11/01/2021 24           Assessment/Plan:  Veronica Tena is 82 y/o female who was seen today for hypothyroidism, allergies/asthma, urinary tract infection/frequency/urethral stricture, gastroesophageal reflux, hyperlipidemia and back pain. 1. Urine frequency  Cystoscope with no cancer or stones in December by Dr. Marcial Li  Urethral narrowing/stricture had stretched  Continue oxybutin 5 mg BID  Get urine culture as asx and leuk esterase positive  - POCT Urinalysis no Micro  - Culture, Urine    2. Hypothyroidism, unspecified type  Repeat levels titrate to goal  - levothyroxine (SYNTHROID) 100 MCG tablet; TAKE 1 TABLET DAILY  Dispense: 90 tablet; Refill: 1  - TSH; Future  - T4, Free; Future    3. Allergic rhinitis, unspecified seasonality, unspecified trigger  Symptoms well controlled  -     cetirizine (ZYRTEC) 10 MG tablet; TAKE 1 TABLET NIGHTLY  -     fluticasone (FLONASE) 50 MCG/ACT nasal spray; SHAKE LIQUID AND USE 2 SPRAYS IN EACH NOSTRIL DAILY  - montelukast (SINGULAIR) 10 MG tablet; TAKE 1 TABLET NIGHTLY  Dispense: 90 tablet; Refill: 1  +albuterol for weehzing    4. Pure hypercholesterolemia  Statin intolerant; pt wants repeat levels to see if diet/exercise have helped  - Lipid Panel; Future    5. Chronic bilateral low back pain without sciatica  6. Lumbosacral spondylosis without myelopathy  7. DDD (degenerative disc disease), lumbar  8. Chronic midline thoracic back pain  9. Primary osteoarthritis of both hips  Well controlled on NSAIDS and robaxin  -inquires/requests lift chair for house  - ibuprofen (ADVIL;MOTRIN) 400 MG tablet; 1-2 po tid prn with food  Dispense: 180 tablet; Refill: 3  - Lift Chair MISC; by Does not apply route  Dispense: 1 each; Refill: 0    Health Maintenance: There are no preventive care reminders to display for this patient. Immunizations:  Up to date    Return in about 6 months (around 10/8/2022). Spent 30-39 minutes of face to face interaction with patient counseling on diagnoses and treatment plan; including but not limited to pre visit planning, chart/lab review, new orders, instructions, charting    EMR Dragon/transcription disclaimer:  Much of this encounter note is electronic transcription/translation of spoken language to printed texts. The electronic translation of spoken language may be erroneous, or at times, nonsensical words or phrases may be inadvertently transcribed. Although I have reviewed the note for such errors, some may still exist.       Edel Frankel.  Taylor Crareno., DO  4/8/2022

## 2022-04-11 ENCOUNTER — TELEPHONE (OUTPATIENT)
Dept: PRIMARY CARE CLINIC | Age: 72
End: 2022-04-11

## 2022-04-11 DIAGNOSIS — N39.0 RECURRENT UTI: Primary | ICD-10-CM

## 2022-04-11 NOTE — TELEPHONE ENCOUNTER
New urine culture put in; pt can come up and get at lab; would not get at office as we mislabeled last week    1. Recurrent UTI  - Culture, Urine;  Future

## 2022-04-11 NOTE — TELEPHONE ENCOUNTER
Lab called stating pt urine culture was rejected, received not labeled. Do you want pt to give another sample?

## 2022-04-12 RX ORDER — CETIRIZINE HYDROCHLORIDE 10 MG/1
TABLET ORAL
Qty: 90 TABLET | Refills: 1 | Status: SHIPPED | OUTPATIENT
Start: 2022-04-12 | End: 2022-10-07 | Stop reason: SDUPTHER

## 2022-04-12 NOTE — TELEPHONE ENCOUNTER
Duplicate request - 1st request was sent to [facility] - 2nd request was sent from [facility]. I called pt and she stated she uses [facility] for this medication     Refill Request     Last Seen: Last Seen Department: 4/8/2022  Last Seen by PCP: 4/8/2022    Last Written: 4/8/22    Next Appointment:   Future Appointments   Date Time Provider Lopez Coronel   10/7/2022  1:30 PM Michelle Hughes, DO [facility]             Requested Prescriptions     Pending Prescriptions Disp Refills    cetirizine (ZYRTEC) 10 MG tablet [Pharmacy Med Name: CETIRIZINE 10MG TABLETS] 90 tablet 1     Sig: TAKE 1 TABLET BY MOUTH EVERY NIGHT

## 2022-04-19 NOTE — PROGRESS NOTES
[address] and [facility]                                             [address] [facility], [address] [place], [place] [address]h[facility], [facility] [address]        Phone: [phone]      Name:  Negar Torres  :    1950    Consultants:   Patient Care Team:  Tonia Hong. Jeff Rosenberg DO as PCP - General (Family Medicine)  Tonia Hong. Jeff Rosenberg DO as PCP [facility]  Mendel Kings, APRN - CNP as PCP - Hematology/Oncology (Nurse Practitioner)  Abad Smith MD as Obstetrician (Obstetrics & Gynecology)  Mendel Kings, APRN - CNP as Advanced Practice Nurse (Gynecologic Oncology)    Chief Complaint:     Negar Torres is a 81 y.o. female  who presents today for an established patient care visit with Personalized Prevention Plan Services as noted below. Chief Complaint   Patient presents with    Rash     felt something L side of neck / sharp pain in middle of night went to [facility] and they said Shingles       HPI:     Negar Torres is a 81 y.o. female with a PMH of GERD, asthma, hyperlipidemia, back pain, hypothyroidism, and urinary frequency who presents to the practice today for a rash on the left side of her neck. Patient states this rash started either Friday on Saturday when she was working in the yard. She felt a bite where her neck is. The area was pruritic and caused her to itch it. Additionally, she has been having issues with insects (ants) in her apartment. Then on Monday, she noticed the rash was raised and red. It was not itchy or tender. She went to urgent care on Tuesday. They prescribed her valacyclovir and told her the rash was shingles. She denies any vesicle looking lesion. The area is not painful, tender to the touch, or crusting. She does have her shingles vaccines. According to our records her last dose was in 2019. Her main concern is if she should continue taking the valacyclovir.     Patient Active Problem List   Diagnosis    Hypothyroid    GERD (gastroesophageal reflux disease)    Hyperlipidemia    H/O vulvar dysplasia    Localized osteoarthrosis, lower leg    Vaginal atrophy    DDD (degenerative disc disease), lumbar    Osteoarthritis of both hips    Chest skin lesion    Lumbosacral spondylosis without myelopathy    Displacement of lumbar intervertebral disc without myelopathy    Anxiety    Primary insomnia    Chronic midline thoracic back pain    Mild intermittent asthma without complication    Tonsil stone    Mixed stress and urge urinary incontinence    Obesity (BMI 30-39. 9)    Spider veins of both lower extremities     Past Medical History:    Past Medical History:   Diagnosis Date    Allergic rhinitis     Anxiety     Asthma     Bipolar 1 disorder ([facility] [address]     questionable dx per patient    Chronic low back pain     COPD (chronic obstructive pulmonary disease) ([facility])     Decreased bone density 9-9-2008    First degree hemorrhoids 9/1/2016    Former smoker 6/13/2013    GERD (gastroesophageal reflux disease)     H/O vulvar dysplasia 1/9/2015    VERONICA 3 wide local excision     Hyperlipidemia     Hypothyroid     Hypothyroidism     Insomnia     Obesity (BMI 30-39.9) 3/16/2021    Osteoarthritis     Prolonged emergence from general anesthesia     Urinary incontinence      Past Surgical History:  Past Surgical History:   Procedure Laterality Date    COLONOSCOPY  18 Apr 2016    Diverticulosis, hemorrhoid    CYSTOSCOPY N/A 12/13/2021    CYSTOSCOPY,  URETHRAL DILATATION performed by Verona Harding MD at [facility] ? Endometriosis - Benign    OTHER SURGICAL HISTORY  12/13/2021      cystoscopy, urethral dilation    TUBAL LIGATION  1981     Home Meds:  Prior to Visit Medications    Medication Sig Taking?  Authorizing Provider   valACYclovir (VALTREX) 500 MG tablet Take 500 mg by mouth in the morning, at noon, and at bedtime Yes Historical Provider, MD   hydrocortisone (V-R HYDROCORTISONE/ALOE) 0.5 % ointment Apply topically 2 times daily. Yes Papi Tay MD   cetirizine (ZYRTEC) 10 MG tablet TAKE 1 TABLET BY MOUTH EVERY NIGHT Yes Edel Gan, DO   fluticasone (FLONASE) 50 MCG/ACT nasal spray SHAKE LIQUID AND USE 2 SPRAYS IN EACH NOSTRIL DAILY Yes Daryc Gan, DO   levothyroxine (SYNTHROID) 100 MCG tablet TAKE 1 TABLET DAILY Yes Edel Gan, DO   montelukast (SINGULAIR) 10 MG tablet TAKE 1 TABLET NIGHTLY Yes Edel Gan, DO   ibuprofen (ADVIL;MOTRIN) 400 MG tablet 1-2 po tid prn with food Yes Edel Gan, DO   Lift Chair MISC by Does not apply route Yes Edel Gan, DO   pantoprazole (PROTONIX) 40 MG tablet TAKE 1 TABLET EVERY MORNINGBEFORE BREAKFAST Yes Edel Gan, DO   baclofen (LIORESAL) 10 MG tablet Take 1 tablet by mouth 3 times daily Yes Edel Gan, DO   albuterol sulfate  (90 Base) MCG/ACT inhaler USE 2 INHALATIONS ORALLY   EVERY 6 HOURS AS NEEDED FORWHEEZING Yes Edel Gan, DO   oxybutynin (DITROPAN) 5 MG tablet TAKE 1 TABLET TWICE A DAY Yes Darcy Gan, DO   Coenzyme Q10 200 MG CAPS Take 1 tablet by mouth daily Yes Edita Hernandez MD   Estradiol (VAGIFEM) 10 MCG TABS vaginal tablet  Yes Historical Provider, MD   folic acid (FOLVITE) 430 MCG tablet Take 400 mcg by mouth daily Yes Historical Provider, MD   Ascorbic Acid (VITAMIN C) 1000 MG tablet Take 1,000 mg by mouth daily Yes Historical Provider, MD   Calcium Carbonate Antacid 1000 MG CHEW Take by mouth Yes Historical Provider, MD   [employer] VITAMIN D3 1000 units CAPS TAKE 1 SOFTGEL BY MOUTH DAILY Yes Reyna Quiñones APRN - CNP   imiquimod (ALDARA) 5 % cream Apply topically three times a week Indications: [facility] ordering Apply topically three times a week.  Yes RUSLAN Lima - CNP   Psyllium (METAMUCIL) 28.3 % POWD Take 1 each by mouth daily Yes Historical Provider, MD   lactase (Nataliia [address]) 3000 UNITS tablet Take 1 tablet by mouth 3 times daily (with meals) Yes ANJUM Salinas MD   hydrocortisone (ANUSOL-HC) 2.5 % rectal cream Place rectally 2 times daily. Yes RUSLAN Mccauley - CNP   acetaminophen (TYLENOL) 325 MG tablet Take 650 mg by mouth every 6 hours as needed for Pain Yes Historical Provider, MD   fluticasone (FLONASE) 50 MCG/ACT nasal spray 2 sprays by Each Nostril route daily  Sophia Suh MD   Handicap Placard MISC by Does not apply route Good for 5  years  Nonda Goodell M. Nestor Garfinkel., DO   tolterodine (DETROL LA) 4 MG ER capsule Take 1 capsule by mouth daily. Ricky Bradley MD     Allergies: Allergies   Allergen Reactions    Latex Rash    Penicillins Shortness Of Breath    Lipitor [Atorvastatin Calcium] Other (See Comments)     Muscle cramps    Nexium [Esomeprazole Magnesium] Other (See Comments)     Cramps, including feet, legs, and sides of abdomen    Bactrim [Sulfamethoxazole-Trimethoprim] Other (See Comments)     Dry mouth    Ciprofloxacin Rash     Rash, headache    Etodolac Rash    Flagyl [Metronidazole] Rash    Naprosyn [Naproxen] Nausea And Vomiting     GI upset    Prednisone Other (See Comments)     Headache, increased BP     Family History:       Problem Relation Age of Onset    Heart Disease Mother     Cancer Mother         uterine?     Mental Illness Father     Cancer Father         prostate    Stroke Father     Schizophrenia Father     Cancer Sister         skin    Bipolar Disorder Sister     Depression Son         PTSD    OCD Son      Health Maintenance Completed:  Health Maintenance   Topic Date Due    Breast cancer screen  10/20/2022   [employer] Annual Wellness Visit (AWV)  11/02/2022    Depression Screen  04/08/2023    TSH  04/08/2023    Colorectal Cancer Screen  04/18/2026    Lipids  04/08/2027    DTaP/Tdap/Td vaccine (3 - Td or Tdap) 06/04/2029    DEXA (modify frequency per FRAX score)  Completed    Flu vaccine  Completed    Shingles Vaccine Completed    Pneumococcal 65+ years Vaccine  Completed    COVID-19 Vaccine  Completed    Hepatitis C screen  Completed    Hepatitis A vaccine  Aged Out    Hepatitis B vaccine  Aged Out    Hib vaccine  Aged Out    Meningococcal (ACWY) vaccine  Aged Out    Low dose CT lung screening  Discontinued      Immunization History   Administered Date(s) Administered    COVID-19, Pfizer Purple top, DILUTE for use, 12+ yrs, 30mcg/0.3mL dose 03/11/2021, 04/02/2021, 10/13/2021    Influenza 10/21/2010    Influenza A ([id number]) Vaccine PF IM 10/30/2009    Influenza Vaccine, unspecified formulation 09/14/2014    Influenza Virus Vaccine 09/30/2013, 09/01/2015, 09/09/2021    Influenza, High Dose (Fluzone 65 yrs and older) 10/03/2017, 08/17/2018    Influenza, Quadv, IM, PF (6 mo and older Fluzone, Flulaval, Fluarix, and 3 yrs and older Afluria) 09/13/2016    Influenza, Quadv, adjuvanted, 65 yrs +, IM, PF (Fluad) 11/04/2020    Influenza, Triv, inactivated, subunit, adjuvanted, IM (Fluad 65 yrs and older) 09/06/2019    Pneumococcal Conjugate 13-valent ([id number]) 09/01/2015    Pneumococcal Polysaccharide ([id number]) 04/07/2015, 11/23/2020    Tdap (Boostrix, Adacel) 04/06/2009, 06/04/2019    Zoster Live (Zostavax) 08/13/2013    Zoster Recombinant (Shingrix) 08/17/2018, 02/18/2019, 03/18/2019, 05/24/2019     Review of Systems:  Review of Systems   Constitutional: Negative. HENT: Negative. Eyes: Negative. Respiratory: Negative. Cardiovascular: Negative. Skin: Positive for rash. Neurological: Negative. Psychiatric/Behavioral: Negative. Physical Exam:   Vitals:    04/20/22 1028   BP: 110/64   Site: Left Upper Arm   Position: Sitting   Cuff Size: Large Adult   Pulse: 75   Temp: 98 °F (36.7 °C)   TempSrc: Oral   SpO2: 97%   Weight: 215 lb (97.5 kg)     Body mass index is 36.9 kg/m².     Wt Readings from Last 3 Encounters:   04/20/22 215 lb (97.5 kg)   04/08/22 215 lb (97.5 kg)   12/13/21 215 lb (97.5 kg)       BP Readings from Last 3 Encounters:   04/20/22 110/64   04/08/22 124/66   12/13/21 (!) 101/57     Physical Exam  Constitutional:       Appearance: Normal appearance. She is obese. HENT:      Head: Normocephalic and atraumatic. Eyes:      Extraocular Movements: Extraocular movements intact. Conjunctiva/sclera: Conjunctivae normal.      Pupils: Pupils are equal, round, and reactive to light. Cardiovascular:      Rate and Rhythm: Normal rate and regular rhythm. Heart sounds: Normal heart sounds. Pulmonary:      Effort: Pulmonary effort is normal.      Breath sounds: Normal breath sounds. Abdominal:      General: Abdomen is flat. Bowel sounds are normal.      Palpations: Abdomen is soft. Musculoskeletal:         General: Normal range of motion. Cervical back: Normal range of motion. Skin:     General: Skin is warm and dry. Findings: Rash present. Rash is not crusting, pustular or vesicular. Comments: The rash is erythematous, raised, approximately 3 cm, and a linear fashion. There are no vesicles, crusting, or oozing present. The area is not itchy nor tender to the touch. Neurological:      General: No focal deficit present. Mental Status: She is alert and oriented to person, place, and time. Mental status is at baseline. Psychiatric:         Mood and Affect: Mood normal.         Behavior: Behavior normal.         Thought Content: Thought content normal.         Judgment: Judgment normal.        Lab Review:   not applicable     Assessment/Plan:   Diagnosis Orders   1. Rash of neck          Panda Dejesus is a 81 y.o. female with a PMH of GERD, asthma, hyperlipidemia, back pain, hypothyroidism, and urinary frequency who presents to the practice today for a rash on her neck. 1. Rash of neck  -Not at goal, improving. The patient states the rash is not bothering her whatsoever  - Discontinue acyclovir. This lesion did not look like shingles.   - Ordered topical hydrocortisone cream to apply on the area if pruritus recurs again  - Advised the patient to call the office of any questions or concerns arise  - Follow-up as needed for the rash on her neck    Health Maintenance Due:  There are no preventive care reminders to display for this patient. Health Care Decision Maker:  up to date:  already done    Health Maintenance: (USPSTF Recommendations)  (F) Breast Cancer Screen: (40-49 (C), 50-74 biennial screening mammogram (B)): Birads category 1- WNL , 10/20/2021  (F) Cervical Cancer Screen: (21-29 q3yr cytology alone; 30-65 q3yr cytology alone, q5yr with hrHPV alone, or q5yr cytology+hrHPV (A)): No longer indicated  CRC/Colonoscopy Screening: (adults 45-49 (B), 50-75 (A)): Negative FIT 9/29/2020  Lung Ca Screening: Annual LDCT (+smoker age 49-80, smoked within 15 years, total of 20 pack yr history (B)): Not indicated at this time   DEXA Screen: (women >65 and older, <65 if at risk/postmenopausal (B)): Osteopenia 4/7/2017  HIV Screen: (15-65 yr old, and all pregnant patients (A)): Non reactive 6/17/2010  Hep C Screen: (18-79 yr old (B)): Non-reactive 12/29/2016  [facility] [address] Screen: (all pts with cirrhosis and high risk Hep B (US q6 mo)):  Immunizations:    RTC:  Return if symptoms worsen or fail to improve. EMR Dragon/transcription disclaimer:  Much of this encounter note is electronic transcription/translation of spoken language to printed texts. The electronic translation of spoken language may be erroneous, or at times, nonsensical words or phrases may be inadvertently transcribed.   Although I have reviewed the note for such errors, some may still exist.     Danielle Canada MD  PGY-1 Resident  [facility] Medicine Res[facility]

## 2022-04-20 ENCOUNTER — OFFICE VISIT (OUTPATIENT)
Dept: PRIMARY CARE CLINIC | Age: 72
End: 2022-04-20
Payer: MEDICARE

## 2022-04-20 VITALS
DIASTOLIC BLOOD PRESSURE: 64 MMHG | BODY MASS INDEX: 36.9 KG/M2 | SYSTOLIC BLOOD PRESSURE: 110 MMHG | HEART RATE: 75 BPM | TEMPERATURE: 98 F | OXYGEN SATURATION: 97 % | WEIGHT: 215 LBS

## 2022-04-20 DIAGNOSIS — R21 RASH OF NECK: Primary | ICD-10-CM

## 2022-04-20 PROCEDURE — 99213 OFFICE O/P EST LOW 20 MIN: CPT | Performed by: STUDENT IN AN ORGANIZED HEALTH CARE EDUCATION/TRAINING PROGRAM

## 2022-04-20 RX ORDER — VALACYCLOVIR HYDROCHLORIDE 500 MG/1
500 TABLET, FILM COATED ORAL 3 TIMES DAILY
COMMUNITY
Start: 2022-04-19

## 2022-04-20 RX ORDER — DIAPER,BRIEF,INFANT-TODD,DISP
EACH MISCELLANEOUS
Qty: 1 EACH | Refills: 0 | Status: SHIPPED | OUTPATIENT
Start: 2022-04-20 | End: 2022-10-07 | Stop reason: SDUPTHER

## 2022-04-20 ASSESSMENT — PATIENT HEALTH QUESTIONNAIRE - PHQ9
SUM OF ALL RESPONSES TO PHQ QUESTIONS 1-9: 0
1. LITTLE INTEREST OR PLEASURE IN DOING THINGS: 0
2. FEELING DOWN, DEPRESSED OR HOPELESS: 0
SUM OF ALL RESPONSES TO PHQ QUESTIONS 1-9: 0
SUM OF ALL RESPONSES TO PHQ9 QUESTIONS 1 & 2: 0

## 2022-04-20 ASSESSMENT — ANXIETY QUESTIONNAIRES
IF YOU CHECKED OFF ANY PROBLEMS ON THIS QUESTIONNAIRE, HOW DIFFICULT HAVE THESE PROBLEMS MADE IT FOR YOU TO DO YOUR WORK, TAKE CARE OF THINGS AT HOME, OR GET ALONG WITH OTHER PEOPLE: NOT DIFFICULT AT ALL
5. BEING SO RESTLESS THAT IT IS HARD TO SIT STILL: 0
GAD7 TOTAL SCORE: 1
7. FEELING AFRAID AS IF SOMETHING AWFUL MIGHT HAPPEN: 0
2. NOT BEING ABLE TO STOP OR CONTROL WORRYING: 0
1. FEELING NERVOUS, ANXIOUS, OR ON EDGE: 1
4. TROUBLE RELAXING: 0
3. WORRYING TOO MUCH ABOUT DIFFERENT THINGS: 0
6. BECOMING EASILY ANNOYED OR IRRITABLE: 0

## 2022-04-20 ASSESSMENT — ENCOUNTER SYMPTOMS
EYES NEGATIVE: 1
RESPIRATORY NEGATIVE: 1

## 2022-04-29 ENCOUNTER — TELEPHONE (OUTPATIENT)
Dept: PRIMARY CARE CLINIC | Age: 72
End: 2022-04-29

## 2022-04-29 DIAGNOSIS — Z78.9 STATIN INTOLERANCE: Primary | ICD-10-CM

## 2022-04-29 DIAGNOSIS — Z91.89 RISK OF MYOCARDIAL INFARCTION OR STROKE 7.5% OR GREATER IN NEXT 10 YEARS: ICD-10-CM

## 2022-04-29 DIAGNOSIS — E78.2 MIXED HYPERLIPIDEMIA: ICD-10-CM

## 2022-04-29 RX ORDER — EVOLOCUMAB 140 MG/ML
140 INJECTION, SOLUTION SUBCUTANEOUS
Qty: 2.1 ML | Refills: 11 | Status: SHIPPED | OUTPATIENT
Start: 2022-04-29

## 2022-04-29 NOTE — TELEPHONE ENCOUNTER
Pt called stating last week Dr. Luly Aquino was supposed to order a monthly shot for cholesterol to the pharmacy, she called them and they said they have not received anything. Lali Serna or call pt if any questions.

## 2022-04-29 NOTE — TELEPHONE ENCOUNTER
repatha sent; appears not to be covered though    1. Statin intolerance    2. Mixed hyperlipidemia    3.  Risk of myocardial infarction or stroke 7.5% or greater in next 10 years    - Evolocumab (REPATHA) 140 MG/ML SOSY; Inject 1 mL into the skin every 14 days  Dispense: 2.1 mL; Refill: 11

## 2022-04-29 NOTE — TELEPHONE ENCOUNTER
Pt originally stated she did not want an injectable, pt has since changed her mind and would like for the repatha to be sent to her pharmacy.

## 2022-05-05 ENCOUNTER — TELEPHONE (OUTPATIENT)
Dept: ADMINISTRATIVE | Age: 72
End: 2022-05-05

## 2022-05-09 NOTE — TELEPHONE ENCOUNTER
Received DENIAL for [address] [place] [address]. Denial letter attached. Please notify patient. Thank you.

## 2022-05-11 ENCOUNTER — TELEPHONE (OUTPATIENT)
Dept: PRIMARY CARE CLINIC | Age: 72
End: 2022-05-11

## 2022-06-19 DIAGNOSIS — K21.9 GASTROESOPHAGEAL REFLUX DISEASE WITHOUT ESOPHAGITIS: ICD-10-CM

## 2022-06-20 RX ORDER — PANTOPRAZOLE SODIUM 40 MG/1
TABLET, DELAYED RELEASE ORAL
Qty: 90 TABLET | Refills: 0 | Status: SHIPPED | OUTPATIENT
Start: 2022-06-20 | End: 2022-09-02 | Stop reason: SDUPTHER

## 2022-06-20 NOTE — TELEPHONE ENCOUNTER
Refill Request       Last Seen: Last Seen Department: 4/20/2022  Last Seen by PCP: 4/8/2022    Last Written: 4/7/22 #90 with 0     Next Appointment:   Future Appointments   Date Time Provider Lopez Coronel   10/7/2022  1:30 PM Pradeep Velez DO [facility]         Requested Prescriptions     Pending Prescriptions Disp Refills    pantoprazole (PROTONIX) 40 MG tablet [Pharmacy Med Name: PANTOPRAZOLE TAB 40MG DR] 90 tablet 0     Sig: TAKE 1 TABLET EVERY Roberta Dural

## 2022-07-18 ENCOUNTER — TELEPHONE (OUTPATIENT)
Dept: PRIMARY CARE CLINIC | Age: 72
End: 2022-07-18

## 2022-07-18 DIAGNOSIS — M51.36 DDD (DEGENERATIVE DISC DISEASE), LUMBAR: ICD-10-CM

## 2022-07-18 DIAGNOSIS — M16.0 PRIMARY OSTEOARTHRITIS OF BOTH HIPS: ICD-10-CM

## 2022-07-18 DIAGNOSIS — M17.10 LOCALIZED OSTEOARTHROSIS, LOWER LEG: ICD-10-CM

## 2022-07-18 DIAGNOSIS — M51.26 DISPLACEMENT OF LUMBAR INTERVERTEBRAL DISC WITHOUT MYELOPATHY: Chronic | ICD-10-CM

## 2022-07-18 DIAGNOSIS — M47.817 LUMBOSACRAL SPONDYLOSIS WITHOUT MYELOPATHY: Primary | Chronic | ICD-10-CM

## 2022-07-18 NOTE — TELEPHONE ENCOUNTER
Referral placed. Should have printed at nurses station  Please fax  1. Lumbosacral spondylosis without myelopathy  2. Localized osteoarthrosis, lower leg  3. Displacement of lumbar intervertebral disc without myelopathy  4. DDD (degenerative disc disease), lumbar  5.  Primary osteoarthritis of both hips    - [facility] - Roxi Amador

## 2022-07-18 NOTE — TELEPHONE ENCOUNTER
Pt says that she needs us to send a referral for physical therapy to [facility] at [facility] outpatient therapy      Fax:  [phone]  Please call [facility] with any questions.   Ph:  [phone]

## 2022-07-18 NOTE — TELEPHONE ENCOUNTER
Spoke with pt, she states she needs a new referral for PT for when her back goes out. Original referral is from 6/2021    Please advise.

## 2022-07-19 DIAGNOSIS — M47.817 LUMBOSACRAL SPONDYLOSIS WITHOUT MYELOPATHY: Chronic | ICD-10-CM

## 2022-07-19 DIAGNOSIS — M54.6 CHRONIC MIDLINE THORACIC BACK PAIN: ICD-10-CM

## 2022-07-19 DIAGNOSIS — M51.36 DDD (DEGENERATIVE DISC DISEASE), LUMBAR: ICD-10-CM

## 2022-07-19 DIAGNOSIS — M54.50 CHRONIC BILATERAL LOW BACK PAIN WITHOUT SCIATICA: ICD-10-CM

## 2022-07-19 DIAGNOSIS — G89.29 CHRONIC BILATERAL LOW BACK PAIN WITHOUT SCIATICA: ICD-10-CM

## 2022-07-19 DIAGNOSIS — G89.29 CHRONIC MIDLINE THORACIC BACK PAIN: ICD-10-CM

## 2022-07-19 RX ORDER — IBUPROFEN 400 MG/1
TABLET ORAL
Qty: 180 TABLET | Refills: 1 | Status: SHIPPED | OUTPATIENT
Start: 2022-07-19 | End: 2022-09-06 | Stop reason: SDUPTHER

## 2022-07-19 NOTE — TELEPHONE ENCOUNTER
Last Office Visit  -  4/20/22  Next Office Visit  -  10/7/22    Last Filled  -    Last UDS -    Contract -

## 2022-07-22 ENCOUNTER — HOSPITAL ENCOUNTER (OUTPATIENT)
Dept: PHYSICAL THERAPY | Age: 72
Setting detail: THERAPIES SERIES
Discharge: HOME | End: 2022-07-22
Payer: MEDICARE

## 2022-07-22 PROCEDURE — 97112 NEUROMUSCULAR REEDUCATION: CPT

## 2022-07-22 PROCEDURE — 97161 PT EVAL LOW COMPLEX 20 MIN: CPT

## 2022-07-22 PROCEDURE — 97110 THERAPEUTIC EXERCISES: CPT

## 2022-07-22 NOTE — FLOWSHEET NOTE
[address] [facility], [facility], [address] [place] [address] Dr  Phone: [phone]  Fax [phone]    Physical Therapy Daily Treatment Note    Date:  2022    Patient Name:  Haider Coleman    :  1950  MRN: [id number]  Restrictions/Precautions:    Medical/Treatment Diagnosis Information:  Diagnosis:   M47.817 (ICD-10-CM) - Lumbosacral spondylosis without myelopathy    M17.10 (ICD-10-CM) - Localized osteoarthrosis, lower leg    M51.26 (ICD-10-CM) - Displacement of lumbar intervertebral disc without myelopathy    M51.36 (ICD-10-CM) - DDD (degenerative disc disease), lumbar    M16.0 (ICD-10-CM) - Primary osteoarthritis of both hips  Insurance/Certification information:  PT Insurance Information: Medina Mcallister - unable to verify benefits  Physician Information:   Darren Sifuentes DO  Plan of care signed (Y/N):  N  Visit# / total visits:  1/10     G-Code (if applicable):          Physical FS Primary Measure 40  Predicted Points of Change  21  Predicted # of visits   11  Predicted Discharge FS Score 61    Medicare Cap (if applicable):  N/a = total amount used, updated 2022    Time in:   10:45      Timed Treatment: 25 Total Treatment Time:  45  Time out: 11:30  ________________________________________________________________________________________    Pain Level:    /10  SUBJECTIVE:  See initial eval    OBJECTIVE:     Exercise/Equipment Resistance/Repetitions Other comments          LTR    10x B HEP   Bridge with hip ADD 10x B HEP   DKTC with LE on SB 10x HEP   SIJ rotation MET in side-lying  5x B HEP                                                                                                          Other Therapeutic Activities:  Education regarding POC including demonstration with models of anatomy and physiology in order to maximize benefits of treatment; total neuro re-ed 10 minutes    Manual Treatments:       Forrest correction gr IV

## 2022-07-22 NOTE — PROGRESS NOTES
[facility], [facility], [address], [address]  Phone: [phone]  Fax [phone]    Dear Referring Practitioner: Dr. Colleen Coulter,     We had the pleasure of evaluating the following patient for physical therapy services at [facility]. A summary of our findings can be found in the initial assessment below. This includes our plan of care. If you have any questions or concerns regarding these findings, please do not hesitate to contact me at the office phone number. Thank you for the referral.         Physician Signature:_______________________________Date:__________________  By signing above (or electronic signature), therapists plan is approved by physician        LOWER EXTREMITY PHYSICAL THERAPY EVALUATION      Evaluation Date: 7/22/2022    Patient Name: Carolyn Coker   YOB: 1950    Medical Diagnosis:    Lumbosacral spondylosis without myelopathy [M47.817]  Localized osteoarthrosis, lower leg [M17.10]  Displacement of lumbar intervertebral disc without myelopathy [M51.26]  DDD (degenerative disc disease), lumbar [M51.36]  Primary osteoarthritis of both hips [M16.0]  Treatment Diagnosis:  back pain, hip pain  Onset Date:  7/18/22    Referral Date: 7/18/2022   Referring Provider: Ellis Castellon DO   Insurance Provider: Florecita Quiñonez - unable to verify benefits  Restrictions/Precautions:    none    SUBJECTIVE FINDINGS    History of Present Illness:  Pt presents with c/o tailbone/sacral pain and groin pain that started after doing her exercises and felt a pull in her tailbone. Pain got worse after that and then each day following, slightly better, but more pain as the day goes on.    Medical History: asthma, COPD, HLD, HTN, hypothyroid, OA, Bipolar, anxiety, decreased bone density, h/o abdominal surgeries  Current Functional Limitations: none   PLOF: WNL    Red Flags:  none    Pain       Patient describes pain to be aching, tight, pulling  Patient reports 3-4/10 pain at present and  5-6/10 pain at its worst.  Worsened by bending over, doing her exercises, daily movement  Improved by lying down  Pt. reports pain with coughing, sneezing and laughing:   []Yes   [x]No   []NA   Pt. reports bowel and bladder changes (incontinence, retention):   []Yes   [x]No   []NA   Pt. reports saddle paresthesia? []Yes   [x]No   []NA   Pt. reports that the knee/hip/ankle gives out, locks, pops, grinds     []Yes [x]   No    Pt's sleep is affected? []   Yes [x]   No  []   N/A      OBJECTIVE FINDINGS    Imaging Results: n/a      Palpation/Tenderness/Visual Inspection       Increased TTP at B PSIS, ASIS, B hip flexor, gluteals        Gait/Steps/Balance    []   [facility] [x]    Dysfunction Noted.    Comment: wide LAMIN, tail wagging dog     []  All balance WFL unless otherwise noted below:  Single limb stance: unable with compensated Trendelenberg B  Squat: quad dominant    Lumbar Range of Motion/Strength Testing      [x] All WFL except as marked below  ROM (*denotes pain) AROM PROM COMMENTS   Flexion 75%  \"Tight\"   Extension 100%     Sidebending Left 75%     Sidebending Right 75%     Rotation Left  100%  []   Seated  [x]   Standing       Rotation Right 100%   []   Seated  [x]   Standing         Pelvis/Sacral Assessment  Special Test Findings Comments   Standing   Jacques' Sign []Neg   [x]Pos R   [x]Pos L   []NT    Iliac Crest [x]Level  []High R   []High L       ASIS []Level  [x]High R   []High L     PSIS []Level  []High R   [x]High L     Forward Flexion []Neg   [x]Pos R   []Pos L   []NT    Sacral Sulci [x]Even []Prominent R   []Prominent L     Supine   Leg Length []Level  [x]Long R   []Long L  [x]Sx on R [x]Sx on L     ASIS []Level  [x]High R   []High L     PSIS []Level  []High R   [x]High L     Long Sit Test []Neg   [x]Pos R   []Pos L   []NT    Prone   PSIS []Level  []High R   []High L     Sacral base []Even []Prominent R   []Prominent L    Sacral MELISSA []Even []Prominent R   []Prominent L    Flick sign []Neg   []Pos R   []Pos L   []NT        Sensation/Motor Function   [x] All dermatomes WNL except as marked below   [x] All  myotomes WNL except as marked below      Dermatome Left Right   Anterior groin, 2-3 inches below ASIS (L1-L2)     Middle third anterior thigh (L3)     Patella and med malleolus (L4)     Fibular head and dorsum of foot (L5)     Lateral side and plantar surface of foot (S1)     Medial aspect of posterior thigh (S2)     Perianal area (S3,4)            Range of Motion/Strength Testing     [x] All ROM and strength WNL except as marked below   * Denotes limitation by pain    Range Tested AROM PROM MMT/Resisted    Left Right Left Right Left Right   Hip Flexion     4- 4-   Hip Extension     4- 4-   Hip Abduction     4 4   Hip Adduction         Hip IR     4 4   Hip ER     4- 4-   Knee Flexion     5 5   Knee Extension     5 5   Ankle Dorsiflex     5 5   Ankle Plantarflex         Ankle Inversion         Ankle Eversion           Flexibility     [x] All flexibility WNL except as marked below  Muscle Left Right   Hamstrings (90/90) []  WNL  [x] Tight  [] NT []  WNL  [x] Tight  [] NT   Gastroc []  WNL  [] Tight  [x] NT []  WNL  [] Tight  [x] NT   TFL/ITB (Sanjuanitas) []  WNL  [] Tight  [x] NT []  WNL  [] Tight  [x] NT   Iliopsoas (Rosalio) []  WNL  [x] Tight  [] NT []  WNL  [x] Tight  [] NT   Piriformis []  WNL  [] Tight  [x] NT []  WNL  [] Tight  [x] NT       Reflexes/Trunk Strength    [x] All WNL except as marked below     Reflex Left Right Strength Strength   Quadriceps (L3,4)   Transv Abdominis    Achilles (S1,2)       Ankle clonus       Babinski           ASSESSMENT  Pt is a 80 y/o presenting to physical therapy with c/o SIJ dysfunction and leg length discrepancy. Thorough evaluation and examination, identified findings consistent with aberrant motion patterns and impaired biomechanics of SIJ B.  PT recommending skilled, outpatient PT to address deficits and attain below-stated functional goals. .     Body Structures, Functions, Activity Limitations Requiring Skilled Therapeutic Intervention: Decreased functional mobility ,Decreased ADL status,Decreased ROM,Decreased body mechanics,Decreased strength,Decreased balance,Increased pain     Statement of Medical Necessity: Physical Therapy is both indicated and medically necessary as outlined in the POC to increase the likelihood of meeting the functionally related goals stated below. Eval Complexity:    Decision Making: Low Complexity    PLAN OF CARE    Frequency: 1-2x/wk for 4 weeks  Current Treatment Recommendations: Therapeutic exercise, therapeutic activity, manual therapy, gait training, neuromuscular re-education    G-CODE  Physical FS Primary Measure 40  Predicted Points of Change  21  Predicted # of visits   11  Predicted Discharge FS Score 61    GOALS  Short term goal 1: Pt is independent with HEP  Short term goal 2: Pt demonstrates pelvic symmetry consistently between sessions. Short term goal 3: Pt demonstrates WNL B LE strength  Short term goal 4: Pt demonstrates WNL lumbar ROM  Short term goal 5: Pt achieves FOTO predictive value    Thank you for the referral of this patient.      Time In: 10:45  Time Out: 11:30  Timed Code Treatment Minutes:   25 minutes            Total Treatment Time:  45 minutes      Inell Rm, PT DPT  license # [id number]

## 2022-07-28 ENCOUNTER — HOSPITAL ENCOUNTER (OUTPATIENT)
Dept: PHYSICAL THERAPY | Age: 72
Setting detail: THERAPIES SERIES
Discharge: HOME | End: 2022-07-28
Payer: MEDICARE

## 2022-07-28 PROCEDURE — 97110 THERAPEUTIC EXERCISES: CPT

## 2022-07-28 PROCEDURE — 97140 MANUAL THERAPY 1/> REGIONS: CPT

## 2022-07-28 NOTE — FLOWSHEET NOTE
[facility], [facility], [address] [place] [address] Dr  Phone: [phone]  Fax [phone]    Physical Therapy Daily Treatment Note    Date:  2022    Patient Name:  Annie Mcmahon    :  1950  MRN: [id number]  Restrictions/Precautions:    Medical/Treatment Diagnosis Information:  Diagnosis:   M47.817 (ICD-10-CM) - Lumbosacral spondylosis without myelopathy    M17.10 (ICD-10-CM) - Localized osteoarthrosis, lower leg    M51.26 (ICD-10-CM) - Displacement of lumbar intervertebral disc without myelopathy    M51.36 (ICD-10-CM) - DDD (degenerative disc disease), lumbar    M16.0 (ICD-10-CM) - Primary osteoarthritis of both hips  Insurance/Certification information:  PT Insurance Information: [facility] - unable to verify benefits  Physician Information:   Avelina Clayton DO  Plan of care signed (Y/N):  N  Visit# / total visits:  2/10     G-Code (if applicable):          Physical FS Primary Measure 40  Predicted Points of Change  21  Predicted # of visits   11  Predicted Discharge FS Score 61    Medicare Cap (if applicable):  N/a = total amount used, updated 2022    Time in:   12:30     Timed Treatment: 45   Total Treatment Time:  45  Time out: 1:15  ________________________________________________________________________________________    Pain Level:    3/10  SUBJECTIVE:  Pt reports that she felt pretty good after last session until getting out of her car wrong just now.      OBJECTIVE:     Exercise/Equipment Resistance/Repetitions Other comments          LTR    10x B HEP   Bridge with hip ADD 10x B HEP   DKTC with LE on SB 10x HEP   SIJ rotation MET in side-lying   HEP   LTR with LE on SB  10x HEP   Prone knee flexion 10x    3-way ball compression 10x10\" each direction    rockerboard 1' rocking, 1' balance each direction    Standing multifidus 15x B    Post sling  10x B                  Total gym X5 minutes Other Therapeutic Activities:  Education regarding POC including demonstration with models of anatomy and physiology in order to maximize benefits of treatment; total neuro re-ed 10 minutes    Manual Treatments:       [place] correction gr IV B with cavitation on set up  Shotgun technique  B long leg pull  B short axis distraction    Total manual 10 minutes     Modalities:      Test/Measurements:  See initial eval       ASSESSMENT:  Pt demonstrated symmetrical pelvis post treatment. Good knowledge of TA activation. Progress as pt tolerates. Treatment/Activity Tolerance:   [x]Patient tolerated treatment well [] Patient limited by fatique  []Patient limited by pain [] Patient limited by other medical complications  [] Other:     GOALS  Short term goal 1: Pt is independent with HEP  Short term goal 2: Pt demonstrates pelvic symmetry consistently between sessions. Short term goal 3: Pt demonstrates WNL B LE strength  Short term goal 4: Pt demonstrates WNL lumbar ROM  Short term goal 5: Pt achieves FOTO predictive value       Plan: [x] Continue per plan of care [] Alter current plan (see comments)   [] Plan of care initiated [] Hold pending MD visit [] Discharge      Plan for Next Session:  Correction of biomechanical dysfunctions as they present on visit. Restore proper motor firing pattern and functional muscle activation as presented on visit. Progress as tolerates.     Re-Certification Due Date:         Signature:  Prashanth Fairbanks, PT

## 2022-08-01 ENCOUNTER — APPOINTMENT (OUTPATIENT)
Dept: PHYSICAL THERAPY | Age: 72
End: 2022-08-01
Payer: MEDICARE

## 2022-08-09 ENCOUNTER — APPOINTMENT (OUTPATIENT)
Dept: PHYSICAL THERAPY | Age: 72
End: 2022-08-09
Payer: MEDICARE

## 2022-08-09 ENCOUNTER — HOSPITAL ENCOUNTER (OUTPATIENT)
Dept: PHYSICAL THERAPY | Age: 72
Setting detail: THERAPIES SERIES
Discharge: HOME | End: 2022-08-09
Payer: MEDICARE

## 2022-08-09 PROCEDURE — 97110 THERAPEUTIC EXERCISES: CPT

## 2022-08-09 NOTE — FLOWSHEET NOTE
[facility], [facility], [address] [place] [address], [address]   Phone: [phone]  Fax [phone]    Physical Therapy Daily Treatment Note    Date:  2022    Patient Name:  Perlita Shi    :  1950  MRN: [id number]  Restrictions/Precautions:    Medical/Treatment Diagnosis Information:  Diagnosis:   M47.817 (ICD-10-CM) - Lumbosacral spondylosis without myelopathy    M17.10 (ICD-10-CM) - Localized osteoarthrosis, lower leg    M51.26 (ICD-10-CM) - Displacement of lumbar intervertebral disc without myelopathy    M51.36 (ICD-10-CM) - DDD (degenerative disc disease), lumbar    M16.0 (ICD-10-CM) - Primary osteoarthritis of both hips  Insurance/Certification information:  PT Insurance Information: Najma Lam - unable to verify benefits  Physician Information:   Julius Ta DO  Plan of care signed (Y/N):  N  Visit# / total visits:  3/10     G-Code (if applicable):          Physical FS Primary Measure 40  Predicted Points of Change  21  Predicted # of visits   11  Predicted Discharge FS Score 61    Medicare Cap (if applicable):  N/a = total amount used, updated 2022    Time in:   3:30     Timed Treatment: 45   Total Treatment Time:  45  Time out: 4:15  ________________________________________________________________________________________    Pain Level:    3/10  SUBJECTIVE:  Pt reports that she is having muscle soreness in her lateral hip and buttocks B, but thinks it's from the new exercises. No longer having the \"clicking\" in her hip during her exercises.      OBJECTIVE:     Exercise/Equipment Resistance/Repetitions Other comments          LTR    10x B HEP   Bridge with hip ADD 10x B HEP   DKTC with LE on SB 10x HEP   SIJ rotation MET in side-lying   HEP   LTR with LE on SB  10x HEP   Prone knee flexion 10x    3-way ball compression 10x10\" each direction    rockerboard    Standing multifidus    Post sling                   Total gym X5

## 2022-08-26 DIAGNOSIS — J45.20 MILD INTERMITTENT ASTHMA WITHOUT COMPLICATION: ICD-10-CM

## 2022-08-26 RX ORDER — ALBUTEROL SULFATE 90 UG/1
AEROSOL, METERED RESPIRATORY (INHALATION)
Qty: 54 G | Refills: 1 | Status: SHIPPED | OUTPATIENT
Start: 2022-08-26 | End: 2022-10-07 | Stop reason: SDUPTHER

## 2022-08-26 NOTE — TELEPHONE ENCOUNTER
Refill Request       Last Seen: Last Seen Department: 4/20/2022  Last Seen by PCP: 4/8/2022    Last Written: 11/01/2021    Next Appointment:   Future Appointments   Date Time Provider Lopez Coronel   10/7/2022  1:30 PM Colletta Harries, MD [place] [address] [facility]   10/21/2022  1:00 PM [facility] EG WC MAMMO [facility] EG WC [place] [facility]             Requested Prescriptions     Pending Prescriptions Disp Refills    albuterol sulfate HFA (PROVENTIL;VENTOLIN;PROAIR) 108 (90 Base) MCG/ACT inhaler [Pharmacy Med Name: ALBUTEROL(V) INH ] 54 g 1     Sig: USE 2 INHALATIONS ORALLY   EVERY 6 HOURS AS NEEDED Neil Arreola

## 2022-09-01 DIAGNOSIS — K21.9 GASTROESOPHAGEAL REFLUX DISEASE WITHOUT ESOPHAGITIS: ICD-10-CM

## 2022-09-01 NOTE — TELEPHONE ENCOUNTER
Refill Request       Last Seen: Last Seen Department: 4/20/2022  Last Seen by PCP: Visit date not found    Last Written: 6/20, #90, 0 refills    Next Appointment:   Future Appointments   Date Time Provider Lopez Coronel   9/6/2022  2:00 PM [facility] EG WC DEXA [facility]   10/7/2022  1:30 PM MD Vanessa Corrigan [address] [facility]   10/21/2022  1:00 PM [facility] EG WC M[facility]MO [facility] EG  [place] [facility]         Requested Prescriptions     Pending Prescriptions Disp Refills    pantoprazole (PROTONIX) 40 MG tablet 90 tablet 1     Sig: TAKE 1 TABLET EVERY Chantel Svitlana

## 2022-09-02 RX ORDER — PANTOPRAZOLE SODIUM 40 MG/1
TABLET, DELAYED RELEASE ORAL
Qty: 90 TABLET | Refills: 0 | Status: SHIPPED | OUTPATIENT
Start: 2022-09-02 | End: 2022-09-13 | Stop reason: SDUPTHER

## 2022-09-06 DIAGNOSIS — M51.36 DDD (DEGENERATIVE DISC DISEASE), LUMBAR: ICD-10-CM

## 2022-09-06 DIAGNOSIS — G89.29 CHRONIC BILATERAL LOW BACK PAIN WITHOUT SCIATICA: ICD-10-CM

## 2022-09-06 DIAGNOSIS — M54.50 CHRONIC BILATERAL LOW BACK PAIN WITHOUT SCIATICA: ICD-10-CM

## 2022-09-06 DIAGNOSIS — M54.6 CHRONIC MIDLINE THORACIC BACK PAIN: ICD-10-CM

## 2022-09-06 DIAGNOSIS — G89.29 CHRONIC MIDLINE THORACIC BACK PAIN: ICD-10-CM

## 2022-09-06 DIAGNOSIS — M47.817 LUMBOSACRAL SPONDYLOSIS WITHOUT MYELOPATHY: Chronic | ICD-10-CM

## 2022-09-06 RX ORDER — IBUPROFEN 400 MG/1
TABLET ORAL
Qty: 180 TABLET | Refills: 1 | Status: SHIPPED | OUTPATIENT
Start: 2022-09-06 | End: 2022-09-20

## 2022-09-06 NOTE — TELEPHONE ENCOUNTER
Refill Request       Last Seen: Last Seen Department: 4/20/2022  Last Seen by PCP: Visit date not found    Last Written: 07/19/2022    Next Appointment:   Future Appointments   Date Time Provider Lopez Berna   10/7/2022  1:30 PM MD Vanessa Figueroa [address] [facility]   10/21/2022  1:00 PM [address] EG WC MAMMO [facility] EG [facility] [place] [facility]             Requested Prescriptions      No prescriptions requested or ordered in this encounter

## 2022-09-13 ENCOUNTER — TELEPHONE (OUTPATIENT)
Dept: PRIMARY CARE CLINIC | Age: 72
End: 2022-09-13

## 2022-09-13 DIAGNOSIS — K21.9 GASTROESOPHAGEAL REFLUX DISEASE WITHOUT ESOPHAGITIS: ICD-10-CM

## 2022-09-13 RX ORDER — PANTOPRAZOLE SODIUM 40 MG/1
TABLET, DELAYED RELEASE ORAL
Qty: 90 TABLET | Refills: 0 | Status: SHIPPED | OUTPATIENT
Start: 2022-09-13

## 2022-09-13 NOTE — TELEPHONE ENCOUNTER
Patient and [facility] are calling requesting a refill of pantoprazole (PROTONIX) 40 MG tablet      Refill Request       Last Seen: Last Seen Department: 4/20/2022  Last Seen by PCP: Visit date not found    Last Written: 09/02/2022    Next Appointment:   Future Appointments   Date Time Provider Lopez Coronel   10/7/2022  1:30 PM MD Vanessa Cuellar [address] [facility]   10/21/2022  1:00 PM [address] EG WC MAMMO [facility]             Requested Prescriptions      No prescriptions requested or ordered in this encounter

## 2022-09-13 NOTE — TELEPHONE ENCOUNTER
Patient and [facility] are calling requesting a refill of pantoprazole (PROTONIX) 40 MG tablet

## 2022-09-19 NOTE — TELEPHONE ENCOUNTER
This medication  ibuprofen (ADVIL;MOTRIN) 400 MG tablet      Was sent to the wrong pharmacy. This should go to [facility] Mail service.

## 2022-09-20 RX ORDER — IBUPROFEN 400 MG/1
TABLET ORAL
Qty: 180 TABLET | Refills: 1 | Status: SHIPPED | OUTPATIENT
Start: 2022-09-20

## 2022-10-04 NOTE — PROGRESS NOTES
[address] and [facility]                                             [address] [facility], [facility] [place], [address] [place] [address]        Phone: [phone]      Name:  Maxine Mensah  :    1950    Consultants:   Patient Care Team:  Carla Min MD as PCP - General (Family Medicine)  RUSLAN Fowler CNP as PCP - Hematology/Oncology (Nurse Practitioner)  Linh Kelley MD as Obstetrician (Obstetrics & Gynecology)  RUSLAN Fowler CNP as Advanced Practice Nurse (Gynecological Oncology)    Chief Complaint:     Maxine Mensah is a 81 y.o. female  who presents today for an established patient care visit with Personalized Prevention Plan Services as noted below. HPI:     Serina Bowles is a 81year old female with PMH of GERD, mild intermittent asthma, hyperlipidemia, hypothyroidism, osteoarthritis of both hips, osteopenia, degenerative disc disease, mixed incontinence, anxiety, and obesity. Last seen in office on 2022. B/l foot pain: Patient has new foot pain on bilateral balls of feet that she noticed approximately 1 month ago. She does not wake with the pain. She notes this as a \"discomfort\" that gets worse throughout the day and is alleviated by rest.  No associated numbness or tingling. No recent changes in footwear. Hyperlipidemia: Patient previously failed atorvastatin and pravastatin due to myalgias. She was on prescribed evolocumab (Repatha) subcutaneous q 14 days, but this was not covered by her insurance. Last lipid panel 22: , , HDL 61, . She has made significant dietary changes including reduction of red meat intake. GERD: On pantoprazole 40mg daily. Her symptoms are well controlled on this regimen. Hypothyroidism: Last TSH 1.66 on 2022, FT4 1.7. On levothyroxine 100mcg daily.   Patient denies symptoms related to hypothyroidism including fatigue, constipation, bradycardia. Mild intermittent asthma: On albuterol inhaler. Also uses cetirizine (Zyrtec) 10mg, montelukast 10mg, and fluticasone (Flonase) 50mcg nasal spray for concurrent seasonal allergies. Her symptoms are mainly related to exposure to allergens such as dust mites and plants. She has been using her albuterol for 2 puffs twice daily, but denies recent asthma exacerbation. She has an exacerbation approximately 2 times per year. OA of b/l hips/SI joint pain: She reports severe back pain and muscle spasms ever since she had a back injection many years ago. She has seen physical therapy multiple times throughout the years, which has significantly improved her symptoms. Currently takes baclofen 10mg daily with significant relief. Obesity: Last A1C 7/15/2020: 4.8. She has some trouble with ADLs, but has made modifications to complete daily tasks in the setting of her chronic pain. She reports being very active prior to the injection that caused her ongoing pain. She has also made dietary changes including limiting her red meat consumption and cholesterol consumption. Mixed stress and urge urinary incontinence: On oxybutynin 5mg daily. She has noticed an increase yellow color in her urine recently. No recent changes in medications. Denies dehydration, drinks 9 cups of water per day. She continues to have some urinary frequency but denies dysuria. No other acute complaints at this time.     Patient Active Problem List   Diagnosis    Hypothyroid    GERD (gastroesophageal reflux disease)    Hyperlipidemia    H/O vulvar dysplasia    Localized osteoarthrosis, lower leg    Vaginal atrophy    DDD (degenerative disc disease), lumbar    Osteoarthritis of both hips    Chest skin lesion    Lumbosacral spondylosis without myelopathy    Displacement of lumbar intervertebral disc without myelopathy    Anxiety    Primary insomnia    Chronic midline thoracic back pain    Mild intermittent asthma without complication    Tonsil stone    Mixed stress and urge urinary incontinence    Obesity (BMI 30-39.9)    Spider veins of both lower extremities         Past Medical History:    Past Medical History:   Diagnosis Date    Allergic rhinitis     Anxiety     Asthma     Bipolar 1 disorder ([address]     questionable dx per patient    Chronic low back pain     COPD (chronic obstructive pulmonary disease) (HCC)     Decreased bone density 9-9-2008    First degree hemorrhoids 9/1/2016    Former smoker 6/13/2013    GERD (gastroesophageal reflux disease)     H/O vulvar dysplasia 1/9/2015    VERONICA 3 wide local excision     Hyperlipidemia     Hypothyroid     Hypothyroidism     Insomnia     Obesity (BMI 30-39.9) 3/16/2021    Osteoarthritis     Prolonged emergence from general anesthesia     Urinary incontinence        Past Surgical History:  Past Surgical History:   Procedure Laterality Date    COLONOSCOPY  18 Apr 2016    Diverticulosis, hemorrhoid    CYSTOSCOPY N/A 12/13/2021    CYSTOSCOPY,  URETHRAL DILATATION performed by Maddie Whiting MD at [facility] ([address] [place] [address])  2001    Partial ? Endometriosis - Benign    OTHER SURGICAL HISTORY  12/13/2021      cystoscopy, urethral dilation    TUBAL LIGATION  1981       Home Meds:  Prior to Visit Medications    Medication Sig Taking?  Authorizing Provider   methocarbamol (ROBAXIN) 500 MG tablet TAKE 1 TABLET BY MOUTH THREE TIMES DAILY Yes Historical Provider, MD   Omega-3 Fatty Acids (FISH OIL) 1200 MG CAPS Take by mouth Yes Historical Provider, MD   Simethicone (GAS RELIEF EXTRA STRENGTH PO) Take by mouth Gas x oct Yes Historical Provider, MD   GLUCOSAMINE-CHONDROIT-CALCIUM PO Take 1,200 mg by mouth Yes Historical Provider, MD   zinc 50 MG TABS tablet Take 50 mg by mouth daily Yes Historical Provider, MD   Saline (SIMPLY SALINE) 0.9 % AERS by Nasal route Yes Historical Provider, MD   Isopropyl Alcohol (AURO DRI SWIMMERS EARS OT) Place in ear(s) Yes Historical Provider, MD   oxybutynin (DITROPAN) 5 MG tablet TAKE 1 TABLET TWICE A DAY Yes Antonio Keane MD   montelukast (SINGULAIR) 10 MG tablet TAKE 1 TABLET NIGHTLY Yes Antonio Keane MD   levothyroxine (SYNTHROID) 100 MCG tablet TAKE 1 TABLET DAILY Yes Antonio Keane MD   omeprazole (PRILOSEC) 40 MG delayed release capsule Take 1 capsule by mouth daily Yes Antonio Keane MD   ibuprofen (ADVIL;MOTRIN) 400 MG tablet TAKE 1 TO 2 TABLETS 3 TIMESA DAY AS NEEDED WITH FOOD Yes Antonio Keane MD   albuterol sulfate HFA (PROVENTIL;VENTOLIN;PROAIR) 108 (90 Base) MCG/ACT inhaler USE 2 INHALATIONS ORALLY   EVERY 6 HOURS AS NEEDED Eleonore David Yes Evelio Enamorado. Mandeep Pelaez, DO   cetirizine (ZYRTEC) 10 MG tablet TAKE 1 TABLET BY MOUTH EVERY NIGHT Yes Brittney Pelaez, DO   fluticasone (FLONASE) 50 MCG/ACT nasal spray SHAKE LIQUID AND USE 2 SPRAYS IN EACH NOSTRIL DAILY Yes Brittney Pelaez, DO   Lift Chair MISC by Does not apply route Yes Evelio Enamorado. Mandeep Pelaez, DO   Coenzyme Q10 200 MG CAPS Take 1 tablet by mouth daily Yes Miriam Hernandez MD   Estradiol (VAGIFEM) 10 MCG TABS vaginal tablet  Yes Historical Provider, MD   folic acid (FOLVITE) 687 MCG tablet Take 400 mcg by mouth daily Yes Historical Provider, MD   Ascorbic Acid (VITAMIN C) 1000 MG tablet Take 1,000 mg by mouth daily Yes Historical Provider, MD   Calcium Carbonate Antacid 1000 MG CHEW Take by mouth Yes Historical Provider, MD   [employer] VITAMIN D3 1000 units CAPS TAKE 1 SOFTGEL BY MOUTH DAILY Yes RUSLAN Ramsey CNP   imiquimod (ALDARA) 5 % cream Apply topically three times a week Indications: [facility] ordering Apply topically three times a week.  Yes RUSLAN Garcia CNP   psyllium (KONSYL) 28.3 % POWD powder Take 1 each by mouth daily Yes Historical Provider, MD   lactase (LACTAID) 3000 UNITS tablet Take 1 tablet by mouth 3 times daily (with meals) Yes C Lesly Hi MD   hydrocortisone (ANUSOL-HC) 2.5 % rectal cream Place rectally 2 times daily. Yes RUSLAN Soria - CNP   acetaminophen (TYLENOL) 325 MG tablet Take 650 mg by mouth every 6 hours as needed for Pain Yes Historical Provider, MD   MULTIPLE VITAMIN PO Multivitamins Oral Tablet    daily    active  Patient not taking: Reported on 10/7/2022  Historical Provider, MD   pantoprazole (PROTONIX) 40 MG tablet TAKE 1 TABLET EVERY [address] BREAKFAST  Patient not taking: Reported on 10/7/2022  Kim Chatterjee MD   Evolocumab (REPATHA) 140 MG/ML SOSY Inject 1 mL into the skin every 14 days  Patient not taking: Reported on 10/7/2022  Lucina Dumont., DO   valACYclovir (VALTREX) 500 MG tablet Take 500 mg by mouth in the morning, at noon, and at bedtime  Patient not taking: Reported on 10/7/2022  Historical Provider, MD   baclofen (LIORESAL) 10 MG tablet Take 1 tablet by mouth 3 times daily  Patient not taking: Reported on 10/7/2022  Lucina Frank. Ander Dumont., DO   fluticasone [facility]) 50 MCG/ACT nasal spray 2 sprays by Each Nostril route daily  Mahesh May MD   Handicap Placard MISC by Does not apply route Good for 5  years  [place] Alejandra Dumont., DO   tolterodine (DETROL LA) 4 MG ER capsule Take 1 capsule by mouth daily. Uriel Mccall MD       Allergies:    Latex, Penicillins, Lipitor [atorvastatin calcium], Nexium [esomeprazole magnesium], Bactrim [sulfamethoxazole-trimethoprim], Ciprofloxacin, Etodolac, Flagyl [metronidazole], Naprosyn [naproxen], and Prednisone    Family History:       Problem Relation Age of Onset    Heart Disease Mother     Cancer Mother         uterine?     Mental Illness Father     Cancer Father         prostate    Stroke Father     Schizophrenia Father     Cancer Sister         skin    Bipolar Disorder Sister     Depression Son         PTSD    OCD Son          Health Maintenance Completed:  Health Maintenance   Topic Date Due    Flu vaccine (1) 08/01/2022    Breast cancer screen  10/20/2022    Annual Wellness Visit (AWV)  11/02/2022    COVID-19 Vaccine (4 - Booster for Pfizer series) 10/07/2023 (Originally 2/13/2022)    Depression Screen  04/20/2023    Colorectal Cancer Screen  04/18/2026    Lipids  04/08/2027    DTaP/Tdap/Td vaccine (3 - Td or Tdap) 06/04/2029    DEXA (modify frequency per FRAX score)  Completed    Shingles vaccine  Completed    Pneumococcal 65+ years Vaccine  Completed    Hepatitis C screen  Completed    Hepatitis A vaccine  Aged Out    Hib vaccine  Aged Out    Meningococcal (ACWY) vaccine  Aged Out    Low dose CT lung screening  Discontinued          Immunization History   Administered Date(s) Administered    COVID-19, PFIZER PURPLE top, DILUTE for use, (age 15 y+), 30mcg/0.3mL 03/11/2021, 04/02/2021, 10/13/2021    Influenza 10/21/2010    Influenza A ([id number]) Vaccine PF IM 10/30/2009    Influenza Vaccine, unspecified formulation 09/14/2014    Influenza Virus Vaccine 09/30/2013, 09/01/2015, 09/09/2021    Influenza, FLUAD, (age 72 y+), Adjuvanted, 0.5mL 11/04/2020    Influenza, FLUARIX, FLULAVAL, FLUZONE (age 10 mo+) AND AFLURIA, (age 1 y+), PF, 0.5mL 09/13/2016    Influenza, High Dose (Fluzone 65 yrs and older) 10/03/2017, 08/17/2018    Influenza, Triv, inactivated, subunit, adjuvanted, IM (Fluad 65 yrs and older) 09/06/2019    Pneumococcal Conjugate 13-valent ([id number]) 09/01/2015    Pneumococcal Polysaccharide ([id number]) 04/07/2015, 11/23/2020    Tdap (Boostrix, Adacel) 04/06/2009, 06/04/2019    Zoster Live (Zostavax) 08/13/2013    Zoster Recombinant (Shingrix) 08/17/2018, 02/18/2019, 03/18/2019, 05/24/2019         Review of Systems:  Review of Systems   Constitutional:  Negative for activity change, appetite change and fatigue. Respiratory:  Negative for shortness of breath. Cardiovascular:  Negative for chest pain. Genitourinary:  Positive for frequency. Negative for dysuria and hematuria. Musculoskeletal:  Positive for arthralgias and back pain.       Physical Exam:   Vitals:    10/07/22 1243   BP: 122/62   Site: Left Upper Arm   Position: Sitting   Cuff Size: Small Adult   Pulse: 65   Temp: 97.1 °F (36.2 °C)   TempSrc: Temporal   SpO2: 98%   Weight: 215 lb 9.6 oz (97.8 kg)     Body mass index is 37.01 kg/m². Wt Readings from Last 3 Encounters:   10/07/22 215 lb 9.6 oz (97.8 kg)   04/20/22 215 lb (97.5 kg)   04/08/22 215 lb (97.5 kg)       BP Readings from Last 3 Encounters:   10/07/22 122/62   04/20/22 110/64   04/08/22 124/66       Physical Exam  Constitutional:       Appearance: Normal appearance. She is obese. HENT:      Head: Normocephalic and atraumatic. Cardiovascular:      Rate and Rhythm: Normal rate and regular rhythm. Heart sounds: Normal heart sounds. Pulmonary:      Effort: Pulmonary effort is normal.      Breath sounds: Normal breath sounds. Abdominal:      General: Abdomen is flat. There is no distension. Palpations: Abdomen is soft. Tenderness: There is no abdominal tenderness. Musculoskeletal:      Right lower leg: No edema. Left lower leg: No edema. Feet:      Right foot:      Skin integrity: Callus and dry skin present. No ulcer, blister, skin breakdown, erythema or warmth. Left foot:      Skin integrity: Callus and dry skin present. No ulcer, blister, skin breakdown, erythema or warmth. Skin:     General: Skin is warm. Neurological:      Mental Status: She is alert. Psychiatric:         Mood and Affect: Mood normal.         Behavior: Behavior normal.         Thought Content: Thought content normal.         Judgment: Judgment normal.            Lab Review:   No visits with results within 2 Month(s) from this visit.    Latest known visit with results is:   Hospital Outpatient Visit on 04/08/2022   Component Date Value    Cholesterol, Total 04/08/2022 202 (A)     Triglycerides 04/08/2022 116     HDL 04/08/2022 61 (A)     LDL Calculated 04/08/2022 118 (A)     VLDL Cholesterol Calcula* 04/08/2022 23     TSH 04/08/2022 1.66     T4 Free 04/08/2022 1.7     Sodium 04/08/2022 137     Potassium 04/08/2022 4.8     Chloride 04/08/2022 99     CO2 04/08/2022 24     Anion Gap 04/08/2022 14     Glucose 04/08/2022 94     BUN 04/08/2022 13     Creatinine 04/08/2022 0.8     GFR Non- 04/08/2022 >60     GFR  04/08/2022 >60     Calcium 04/08/2022 9.9     Total Protein 04/08/2022 7.1     Albumin 04/08/2022 4.8     Albumin/Globulin Ratio 04/08/2022 2.1     Total Bilirubin 04/08/2022 0.3     Alkaline Phosphatase 04/08/2022 83     ALT 04/08/2022 19     AST 04/08/2022 20           Assessment/Plan:  Andres Muñiz is a 81year old female with PMH of GERD, mild intermittent asthma, hyperlipidemia, hypothyroidism, osteoarthritis of both hips, osteopenia, degenerative disc disease, mixed incontinence, anxiety, and obesity. Diagnoses and all orders for this visit:    B/l Foot Pain  - Uncontrolled  - Suspect plantar fascitis  - Given exercises for  - Recommend supportive footwear and wearing supportive shoes around house (not barefoot)  - RTC in 4 months to discuss symptoms    Pure hypercholesterolemia  - Uncontrolled  - Last lipid panel 4/8/22: , , HDL 61,   - The 10-year ASCVD risk score (Peng DK, et al., 2019) is: 8.5%  - Patient previously failed atorvastatin and pravastatin 20 mg due to myalgias  - Evolocumab was not covered by insurance  - Extensive discussion on ASCVD risk and current recommendations for statin therapy, USPSTF recommending treatment at >10% risk and ACC/AHA guidelines recommending treatment at >8.5% risk  - With patient's recent dietary changes, she wishes to hold off on statin therapy at this time  - Will plan to recheck lipid panel at 1 year   - Consider low dose pravastatin in future to decrease risk  - RTC in 1 month to discuss progress with diet    OAB (overactive bladder)  - Controlled  - Reports increased yellow color of urine without dysuria  -     POCT Urinalysis no Micro:  WNL  - Continue oxybutynin (DITROPAN) 5 MG tablet; TAKE 1 TABLET TWICE A DAY  - RTC PRN for worsening symptoms    Allergic rhinitis, unspecified seasonality, unspecified trigger  - Controlled  - Continue montelukast, fluticasone nasal spray, and   -     montelukast (SINGULAIR) 10 MG tablet; TAKE 1 TABLET NIGHTLY  - RTC PRN for worsening symptoms    Hypothyroidism, unspecified type  - Controlled  - No symptoms of hypo or hyperthyroidism  -     levothyroxine (SYNTHROID) 100 MCG tablet; TAKE 1 TABLET DAILY  - Last TSH 1.66 on 4/8/2022, FT4 1.7.  - Plan to repeat TSH at 1 year  - RTC as needed for worsening symptoms    Obesity (BMI 30-39.9)  - Uncontrolled  - Last A1C 7/15/2020: 4.8  - Attempting to make dietary changes including reduction in red meats and cholesterol  - Recommend 150 minutes of moderate intensity exercise weekly  -     Hemoglobin A1C; Future  - RTC in 1 month to discuss lab results    Mild intermittent asthma without complication  - Controlled  - Currently has approximately 2 exacerbations per year  - Uses albuterol twice daily, along with montelukast, cetirizine, and flonase  - Discussed recommended use of albuterol inhaler as a short acting intervention when she experiences symptoms  - Will follow up in 1 month to discuss her symptoms with changes of albuterol    GERD  - Controlled  -  Prescribed omeprazole (PRILOSEC) 40 MG delayed release capsule;  Take 1 capsule by mouth daily  - RTC PRN for worsening symptoms    Health Maintenance:  - Last mammogram: 10/20/2021, [facility] 1  - Colonoscopy: 4/18/2016; due 4/18/2026  - DEXA: Ordered 8/25/2022, not completed; last done 4/7/2017 (osteopenia, femoral neck T score -1.5 R hip, total R hip -1.1; L femoral neck -1.3, L total hip -1.1, lumbar spine -1.0)  - Flu vaccine: declined at this time, will consider  - LDCT: former smoker, 52.5 pack year, quit 1/2010; patient is not interested in CT at this time and understands the risks of potential unknown lung cancer and is agreeable to risk     Health Maintenance Due:  Health Maintenance Due   Topic Date Due    Flu vaccine (1) 08/01/2022    Breast cancer screen  10/20/2022    Annual Wellness Visit (AWV)  11/02/2022          Health care decision maker:  up to date:  already done      Health Maintenance: (USPSTF Recommendations)  (F) Breast Cancer Screen: (40-49 (C), 50-74 biennial screening mammogram (B))  (F) Cervical Cancer Screen: (21-29 q3yr cytology alone; 30-65 q3yr cytology alone, q5yr with hrHPV alone, or q5yr cytology+hrHPV (A))  CRC/Colonoscopy Screening: (adults 45-49 (B), 50-75 (A))  Lung Ca Screening: Annual LDCT (+smoker age 49-80, smoked within 15 years, total of 20 pack yr history (B)):  DEXA Screen: (women >65 and older, <65 if at risk/postmenopausal (B))  HIV Screen: (16-65 yr old, and all pregnant patients (A)): Hep C Screen: (18-79 yr old (B)):  HCC Screen: (all pts with cirrhosis and high risk Hep B (US q6 mo)):  Immunizations:    RTC:  Return in about 4 weeks (around 11/4/2022) for AWV. EMR Dragon/transcription disclaimer:  Much of this encounter note is electronic transcription/translation of spoken language to printed texts. The electronic translation of spoken language may be erroneous, or at times, nonsensical words or phrases may be inadvertently transcribed.   Although I have reviewed the note for such errors, some may still exist.

## 2022-10-07 ENCOUNTER — OFFICE VISIT (OUTPATIENT)
Dept: PRIMARY CARE CLINIC | Age: 72
End: 2022-10-07
Payer: MEDICARE

## 2022-10-07 ENCOUNTER — HOSPITAL ENCOUNTER (OUTPATIENT)
Age: 72
Discharge: HOME | End: 2022-10-07
Payer: MEDICARE

## 2022-10-07 VITALS
DIASTOLIC BLOOD PRESSURE: 62 MMHG | HEART RATE: 65 BPM | BODY MASS INDEX: 37.01 KG/M2 | OXYGEN SATURATION: 98 % | SYSTOLIC BLOOD PRESSURE: 122 MMHG | TEMPERATURE: 97.1 F | WEIGHT: 215.6 LBS

## 2022-10-07 DIAGNOSIS — N32.81 OAB (OVERACTIVE BLADDER): ICD-10-CM

## 2022-10-07 DIAGNOSIS — E66.9 OBESITY (BMI 30-39.9): ICD-10-CM

## 2022-10-07 DIAGNOSIS — J45.20 MILD INTERMITTENT ASTHMA WITHOUT COMPLICATION: ICD-10-CM

## 2022-10-07 DIAGNOSIS — E03.9 HYPOTHYROIDISM, UNSPECIFIED TYPE: ICD-10-CM

## 2022-10-07 DIAGNOSIS — R79.9 ABNORMAL FINDING OF BLOOD CHEMISTRY, UNSPECIFIED: ICD-10-CM

## 2022-10-07 DIAGNOSIS — E78.00 PURE HYPERCHOLESTEROLEMIA: Primary | ICD-10-CM

## 2022-10-07 DIAGNOSIS — J30.9 ALLERGIC RHINITIS, UNSPECIFIED SEASONALITY, UNSPECIFIED TRIGGER: ICD-10-CM

## 2022-10-07 LAB
BILIRUBIN, POC: ABNORMAL
BLOOD URINE, POC: ABNORMAL
CLARITY, POC: CLEAR
COLOR, POC: YELLOW
GLUCOSE URINE, POC: ABNORMAL
KETONES, POC: ABNORMAL
LEUKOCYTE EST, POC: ABNORMAL
NITRITE, POC: ABNORMAL
PH, POC: 6
PROTEIN, POC: ABNORMAL
SPECIFIC GRAVITY, POC: 1.01
UROBILINOGEN, POC: 0.2

## 2022-10-07 PROCEDURE — 81002 URINALYSIS NONAUTO W/O SCOPE: CPT

## 2022-10-07 PROCEDURE — 1123F ACP DISCUSS/DSCN MKR DOCD: CPT

## 2022-10-07 PROCEDURE — 99214 OFFICE O/P EST MOD 30 MIN: CPT

## 2022-10-07 PROCEDURE — 83036 HEMOGLOBIN GLYCOSYLATED A1C: CPT

## 2022-10-07 PROCEDURE — 36415 COLL VENOUS BLD VENIPUNCTURE: CPT

## 2022-10-07 RX ORDER — FLUTICASONE PROPIONATE 50 MCG
SPRAY, SUSPENSION (ML) NASAL
Qty: 48 G | Refills: 1 | Status: SHIPPED | OUTPATIENT
Start: 2022-10-07

## 2022-10-07 RX ORDER — CETIRIZINE HYDROCHLORIDE 10 MG/1
TABLET ORAL
Qty: 90 TABLET | Refills: 1 | Status: SHIPPED | OUTPATIENT
Start: 2022-10-07

## 2022-10-07 RX ORDER — AMOXICILLIN 500 MG
CAPSULE ORAL
COMMUNITY

## 2022-10-07 RX ORDER — OXYBUTYNIN CHLORIDE 5 MG/1
TABLET ORAL
Qty: 180 TABLET | Refills: 1 | Status: SHIPPED | OUTPATIENT
Start: 2022-10-07

## 2022-10-07 RX ORDER — OMEPRAZOLE 20 MG/1
40 CAPSULE, DELAYED RELEASE ORAL DAILY
COMMUNITY
End: 2022-10-07 | Stop reason: SDUPTHER

## 2022-10-07 RX ORDER — METHOCARBAMOL 500 MG/1
TABLET, FILM COATED ORAL
COMMUNITY
Start: 2022-09-27 | End: 2022-10-07 | Stop reason: SDUPTHER

## 2022-10-07 RX ORDER — CHOLECALCIFEROL (VITAMIN D3) 125 MCG
1 CAPSULE ORAL
Qty: 270 TABLET | Refills: 1 | Status: SHIPPED | OUTPATIENT
Start: 2022-10-07

## 2022-10-07 RX ORDER — SODIUM CHLORIDE 3 %
AEROSOL, MIST NASAL
COMMUNITY

## 2022-10-07 RX ORDER — LEVOTHYROXINE SODIUM 0.1 MG/1
TABLET ORAL
Qty: 90 TABLET | Refills: 1 | Status: SHIPPED | OUTPATIENT
Start: 2022-10-07

## 2022-10-07 RX ORDER — METHOCARBAMOL 500 MG/1
TABLET, FILM COATED ORAL
Qty: 30 TABLET | Refills: 3 | Status: SHIPPED | OUTPATIENT
Start: 2022-10-07 | End: 2022-11-02

## 2022-10-07 RX ORDER — MONTELUKAST SODIUM 10 MG/1
TABLET ORAL
Qty: 90 TABLET | Refills: 1 | Status: SHIPPED | OUTPATIENT
Start: 2022-10-07

## 2022-10-07 RX ORDER — ZINC GLUCONATE 50 MG
50 TABLET ORAL DAILY
COMMUNITY

## 2022-10-07 RX ORDER — ALBUTEROL SULFATE 90 UG/1
AEROSOL, METERED RESPIRATORY (INHALATION)
Qty: 54 G | Refills: 1 | Status: SHIPPED | OUTPATIENT
Start: 2022-10-07

## 2022-10-07 RX ORDER — OMEPRAZOLE 40 MG/1
40 CAPSULE, DELAYED RELEASE ORAL DAILY
Qty: 90 CAPSULE | Refills: 1 | Status: SHIPPED | OUTPATIENT
Start: 2022-10-07

## 2022-10-07 ASSESSMENT — ANXIETY QUESTIONNAIRES
3. WORRYING TOO MUCH ABOUT DIFFERENT THINGS: 0-NOT AT ALL
1. FEELING NERVOUS, ANXIOUS, OR ON EDGE: 0
2. NOT BEING ABLE TO STOP OR CONTROL WORRYING: 1-SEVERAL DAYS
GAD7 TOTAL SCORE: 1
5. BEING SO RESTLESS THAT IT IS HARD TO SIT STILL: 0-NOT AT ALL
7. FEELING AFRAID AS IF SOMETHING AWFUL MIGHT HAPPEN: 0-NOT AT ALL
4. TROUBLE RELAXING: 0-NOT AT ALL
6. BECOMING EASILY ANNOYED OR IRRITABLE: 0-NOT AT ALL

## 2022-10-07 ASSESSMENT — PATIENT HEALTH QUESTIONNAIRE - PHQ9
7. TROUBLE CONCENTRATING ON THINGS, SUCH AS READING THE NEWSPAPER OR WATCHING TELEVISION: 0
SUM OF ALL RESPONSES TO PHQ QUESTIONS 1-9: 1
9. THOUGHTS THAT YOU WOULD BE BETTER OFF DEAD, OR OF HURTING YOURSELF: 0
8. MOVING OR SPEAKING SO SLOWLY THAT OTHER PEOPLE COULD HAVE NOTICED. OR THE OPPOSITE, BEING SO FIGETY OR RESTLESS THAT YOU HAVE BEEN MOVING AROUND A LOT MORE THAN USUAL: 0
10. IF YOU CHECKED OFF ANY PROBLEMS, HOW DIFFICULT HAVE THESE PROBLEMS MADE IT FOR YOU TO DO YOUR WORK, TAKE CARE OF THINGS AT HOME, OR GET ALONG WITH OTHER PEOPLE: 0
5. POOR APPETITE OR OVEREATING: 0
1. LITTLE INTEREST OR PLEASURE IN DOING THINGS: 0
6. FEELING BAD ABOUT YOURSELF - OR THAT YOU ARE A FAILURE OR HAVE LET YOURSELF OR YOUR FAMILY DOWN: 0
3. TROUBLE FALLING OR STAYING ASLEEP: 1
SUM OF ALL RESPONSES TO PHQ QUESTIONS 1-9: 1
4. FEELING TIRED OR HAVING LITTLE ENERGY: 0
SUM OF ALL RESPONSES TO PHQ QUESTIONS 1-9: 1
SUM OF ALL RESPONSES TO PHQ QUESTIONS 1-9: 1
SUM OF ALL RESPONSES TO PHQ9 QUESTIONS 1 & 2: 0
2. FEELING DOWN, DEPRESSED OR HOPELESS: 0

## 2022-10-07 ASSESSMENT — ENCOUNTER SYMPTOMS
BACK PAIN: 1
SHORTNESS OF BREATH: 0

## 2022-10-08 LAB
ESTIMATED AVERAGE GLUCOSE: 114 MG/DL
HBA1C MFR BLD: 5.6 %

## 2022-10-20 ENCOUNTER — OFFICE VISIT (OUTPATIENT)
Dept: PRIMARY CARE CLINIC | Age: 72
End: 2022-10-20
Payer: MEDICARE

## 2022-10-20 VITALS
DIASTOLIC BLOOD PRESSURE: 70 MMHG | HEART RATE: 64 BPM | TEMPERATURE: 97.3 F | HEIGHT: 64 IN | RESPIRATION RATE: 18 BRPM | WEIGHT: 207.2 LBS | OXYGEN SATURATION: 98 % | BODY MASS INDEX: 35.37 KG/M2 | SYSTOLIC BLOOD PRESSURE: 124 MMHG

## 2022-10-20 DIAGNOSIS — N39.46 MIXED STRESS AND URGE URINARY INCONTINENCE: ICD-10-CM

## 2022-10-20 DIAGNOSIS — E03.9 HYPOTHYROIDISM, UNSPECIFIED TYPE: ICD-10-CM

## 2022-10-20 DIAGNOSIS — E78.00 PURE HYPERCHOLESTEROLEMIA: ICD-10-CM

## 2022-10-20 DIAGNOSIS — K21.9 GASTROESOPHAGEAL REFLUX DISEASE WITHOUT ESOPHAGITIS: ICD-10-CM

## 2022-10-20 DIAGNOSIS — E66.9 OBESITY (BMI 30-39.9): Primary | ICD-10-CM

## 2022-10-20 PROCEDURE — G0008 ADMIN INFLUENZA VIRUS VAC: HCPCS | Performed by: FAMILY MEDICINE

## 2022-10-20 PROCEDURE — 99214 OFFICE O/P EST MOD 30 MIN: CPT

## 2022-10-20 PROCEDURE — 90694 VACC AIIV4 NO PRSRV 0.5ML IM: CPT | Performed by: FAMILY MEDICINE

## 2022-10-20 PROCEDURE — 1123F ACP DISCUSS/DSCN MKR DOCD: CPT

## 2022-10-20 ASSESSMENT — PATIENT HEALTH QUESTIONNAIRE - PHQ9
2. FEELING DOWN, DEPRESSED OR HOPELESS: 0
SUM OF ALL RESPONSES TO PHQ QUESTIONS 1-9: 0
1. LITTLE INTEREST OR PLEASURE IN DOING THINGS: 0
SUM OF ALL RESPONSES TO PHQ9 QUESTIONS 1 & 2: 0
SUM OF ALL RESPONSES TO PHQ QUESTIONS 1-9: 0

## 2022-10-20 ASSESSMENT — ENCOUNTER SYMPTOMS
NAUSEA: 0
CHEST TIGHTNESS: 0
ABDOMINAL PAIN: 0
FACIAL SWELLING: 0
SHORTNESS OF BREATH: 0
BLOOD IN STOOL: 0
VOMITING: 0
BACK PAIN: 1
SORE THROAT: 0
DIARRHEA: 0
EYES NEGATIVE: 1
COUGH: 0

## 2022-10-20 NOTE — PROGRESS NOTES
[address] and [facility]                                             [address] [facility], [facility], [address] [place] [address]        Phone: [phone]      Name:  Bradly Sever  :    1950    Consultants:   Patient Care Team:  Parish Albert MD as PCP - General (Family Medicine)  RUSLAN Kitchen CNP as PCP - Hematology/Oncology (Nurse Practitioner)  Crescencio Staton MD as Obstetrician (Obstetrics & Gynecology)  RUSLAN Kitchen CNP as Advanced Practice Nurse (Gynecological Oncology)    Chief Complaint:     Bradly Sever is a 81 y.o. female  who presents today for an established patient care visit with Personalized Prevention Plan Services as noted below. HPI:     Andres Muñiz is a 81year old female with PMH of GERD, mild intermittent asthma, hyperlipidemia, hypothyroidism, osteoarthritis of both hips, osteopenia, degenerative disc disease, mixed incontinence, anxiety, and obesity. She is here for diet counseling and support.     Obesity  -patient has intentionally lost 10 lbs since last visit  -she has been dieting and keeping a log of her meals  -20-30 minutes of yoga and stretching for her back 3x weekly  -20 min of walking a few times weekly  -patient drinks 1 coke per day, otherwise low sugar and carb intake    Lab Results   Component Value Date    LABA1C 5.6 10/07/2022    LABA1C 4.8 07/15/2020    LABA1C 5.4 2019      Chronic low back pain  -patient completed 2.5 years of physical therapy which helped  -patient has intermittent and sporadic sacral pain  -takes 2 tylenol 325 mg and 4 ibuprofen 400 mg daily    B/L foot pain  -likely plantar fasciitis  -patient has been using supportive footwear and exercises given at last visit    Pure hypercholesterolemia  -patient wants to hold off on statin, failed atorvastatin and pravastatin in the past    Lab Results   Component Value Date CHOL 202 (H) 04/08/2022    TRIG 116 04/08/2022    HDL 61 (H) 04/08/2022    LDLCALC 118 (H) 04/08/2022    LABVLDL 23 04/08/2022        GERD  -pantoprazole 40 mg daily, well controlled, denies acute flares     Hypothyroidism  -TSH 1.66 4/8/22  -patient takes levothyroxine 100 mcg daily  -denies fatigue, constipation, bradycardia    Mixed stress and urge urinary incontinence  -taking oxybutynin 5 mg daily  -last POC urinalysis negative 10/7/22  -denies dysuria, hematuria, increased frequency which is her baseline, she drinks 8-9 cups of water daily      Patient Active Problem List   Diagnosis    Hypothyroid    GERD (gastroesophageal reflux disease)    Hyperlipidemia    H/O vulvar dysplasia    Localized osteoarthrosis, lower leg    Vaginal atrophy    DDD (degenerative disc disease), lumbar    Osteoarthritis of both hips    Chest skin lesion    Lumbosacral spondylosis without myelopathy    Displacement of lumbar intervertebral disc without myelopathy    Anxiety    Primary insomnia    Chronic midline thoracic back pain    Mild intermittent asthma without complication    Tonsil stone    Mixed stress and urge urinary incontinence    Obesity (BMI 30-39.9)    Spider veins of both lower extremities         Past Medical History:    Past Medical History:   Diagnosis Date    Allergic rhinitis     Anxiety     Asthma     Bipolar 1 disorder (HCC)     questionable dx per patient    Chronic low back pain     COPD (chronic obstructive pulmonary disease) (HCC)     Decreased bone density 9-9-2008    First degree hemorrhoids 9/1/2016    Former smoker 6/13/2013    GERD (gastroesophageal reflux disease)     H/O vulvar dysplasia 1/9/2015    VERONICA 3 wide local excision     Hyperlipidemia     Hypothyroid     Hypothyroidism     Insomnia     Obesity (BMI 30-39.9) 3/16/2021    Osteoarthritis     Prolonged emergence from general anesthesia     Urinary incontinence        Past Surgical History:  Past Surgical History:   Procedure Laterality Date COLONOSCOPY  18 Apr 2016    Diverticulosis, hemorrhoid    CYSTOSCOPY N/A 12/13/2021    CYSTOSCOPY,  URETHRAL DILATATION performed by Job Coombs MD at [facility] ([facility])  2001    Partial ? Endometriosis - Benign    OTHER SURGICAL HISTORY  12/13/2021      cystoscopy, urethral dilation    TUBAL LIGATION  1981       Home Meds:  Prior to Visit Medications    Medication Sig Taking?  Authorizing Provider   MULTIPLE VITAMIN PO  Yes Historical Provider, MD   Omega-3 Fatty Acids (FISH OIL) 1200 MG CAPS Take by mouth Yes Historical Provider, MD   Simethicone (GAS RELIEF EXTRA STRENGTH PO) Take by mouth Gas x oct Yes Historical Provider, MD   GLUCOSAMINE-CHONDROIT-CALCIUM PO Take 1,200 mg by mouth Yes Historical Provider, MD   zinc 50 MG TABS tablet Take 50 mg by mouth daily Yes Historical Provider, MD   Saline (SIMPLY SALINE) 0.9 % AERS by Nasal route Yes Historical Provider, MD   Isopropyl Alcohol (AURO DRI SWIMMERS EARS OT) Place in ear(s) Yes Historical Provider, MD   oxybutynin (DITROPAN) 5 MG tablet TAKE 1 TABLET TWICE A DAY Yes Rigoberto Bro MD   montelukast (SINGULAIR) 10 MG tablet TAKE 1 TABLET NIGHTLY Yes Rigoberto Bro MD   levothyroxine (SYNTHROID) 100 MCG tablet TAKE 1 TABLET DAILY Yes Rigoberto Bro MD   omeprazole (PRILOSEC) 40 MG delayed release capsule Take 1 capsule by mouth daily Yes Rigoberto Bro MD   methocarbamol (ROBAXIN) 500 MG tablet TAKE 1 TABLET BY MOUTH DAILY Yes Rigoberto Bro MD   cetirizine (ZYRTEC) 10 MG tablet TAKE 1 TABLET BY MOUTH EVERY NIGHT Yes Rigoberto Bro MD   lactase (LACTAID) 3000 units tablet Take 1 tablet by mouth 3 times daily (with meals) Yes Rigoberto Bro MD   fluticasone (FLONASE) 50 MCG/ACT nasal spray SHAKE LIQUID AND USE 2 SPRAYS IN EACH NOSTRIL DAILY Yes Rigoberto Bro MD   albuterol sulfate HFA (PROVENTIL;VENTOLIN;PROAIR) 108 (90 Base) MCG/ACT inhaler USE 2 INHALATIONS ORALLY EVERY 6 HOURS AS NEEDED FOR SHORTNESS OF BREATH OR WHEEZING Yes Maribel Epstein MD   ibuprofen (ADVIL;MOTRIN) 400 MG tablet TAKE 1 TO 2 TABLETS 3 TIMESA DAY AS NEEDED WITH FOOD Yes Maribel Epstein MD   pantoprazole (PROTONIX) 40 MG tablet TAKE 1 TABLET EVERY [employer] Yes Maribel Epstein MD   Evolocumab (REPATHA) 140 MG/ML SOSY Inject 1 mL into the skin every 14 days Yes Sebastien Courtney, DO   valACYclovir (VALTREX) 500 MG tablet Take 500 mg by mouth in the morning, at noon, and at bedtime Yes Historical Provider, MD   Lift Chair [address] [facility] by Does not apply route Yes Sebastien Courtney, DO   baclofen (LIORESAL) 10 MG tablet Take 1 tablet by mouth 3 times daily Yes Sebastien Courtney, DO   Coenzyme Q10 200 MG CAPS Take 1 tablet by mouth daily Yes Farrah Hernandez MD   Estradiol (VAGIFEM) 10 MCG TABS vaginal tablet  Yes Historical Provider, MD   folic acid (FOLVITE) 004 MCG tablet Take 400 mcg by mouth daily Yes Historical Provider, MD   Ascorbic Acid (VITAMIN C) 1000 MG tablet Take 1,000 mg by mouth daily Yes Historical Provider, MD   Calcium Carbonate Antacid 1000 MG CHEW Take by mouth Yes Historical Provider, MD   [employer] VITAMIN D3 1000 units CAPS TAKE 1 SOFTGEL BY MOUTH DAILY Yes RUSLAN Ramsey CNP   imiquimod (ALDARA) 5 % cream Apply topically three times a week Indications: [facility] ordering Apply topically three times a week. Yes RUSLAN Perez CNP   psyllium (KONSYL) 28.3 % POWD powder Take 1 each by mouth daily Yes Historical Provider, MD   hydrocortisone (ANUSOL-HC) 2.5 % rectal cream Place rectally 2 times daily.  Yes RUSLAN Perez CNP   acetaminophen (TYLENOL) 325 MG tablet Take 650 mg by mouth every 6 hours as needed for Pain Yes Historical Provider, MD   fluticasone (FLONASE) 50 MCG/ACT nasal spray 2 sprays by Each Nostril route daily  MD Elis Mcfarlane MISC by Does not apply route Good for 5  years José Stoneoter. Omari Contreras.,    tolterodine (DETROL LA) 4 MG ER capsule Take 1 capsule by mouth daily. Aaron Ornelas MD       Allergies:    Latex, Penicillins, Lipitor [atorvastatin calcium], Nexium [esomeprazole magnesium], Bactrim [sulfamethoxazole-trimethoprim], Ciprofloxacin, Etodolac, Flagyl [metronidazole], Naprosyn [naproxen], and Prednisone    Family History:       Problem Relation Age of Onset    Heart Disease Mother     Cancer Mother         uterine?     Mental Illness Father     Cancer Father         prostate    Stroke Father     Schizophrenia Father     Cancer Sister         skin    Bipolar Disorder Sister     Depression Son         PTSD    OCD Son          Health Maintenance Completed:  Health Maintenance   Topic Date Due    Breast cancer screen  10/20/2022    Annual Wellness Visit (AWV)  11/02/2022    COVID-19 Vaccine (4 - Booster for Pfizer series) 10/07/2023 (Originally 2/13/2022)    Depression Screen  10/07/2023    Colorectal Cancer Screen  04/18/2026    Lipids  04/08/2027    DTaP/Tdap/Td vaccine (3 - Td or Tdap) 06/04/2029    DEXA (modify frequency per FRAX score)  Completed    Flu vaccine  Completed    Shingles vaccine  Completed    Pneumococcal 65+ years Vaccine  Completed    Hepatitis C screen  Completed    Hepatitis A vaccine  Aged Out    Hib vaccine  Aged Out    Meningococcal (ACWY) vaccine  Aged Out    Low dose CT lung screening  Discontinued          Immunization History   Administered Date(s) Administered    COVID-19, PFIZER PURPLE top, DILUTE for use, (age 15 y+), 30mcg/0.3mL 03/11/2021, 04/02/2021, 10/13/2021    Influenza 10/21/2010    Influenza A ([id number]) Vaccine PF IM 10/30/2009    Influenza Vaccine, unspecified formulation 09/14/2014    Influenza Virus Vaccine 09/30/2013, 09/01/2015, 09/09/2021    Influenza, FLUAD, (age 72 y+), Adjuvanted, 0.5mL 11/04/2020, 10/20/2022    Influenza, FLUARIX, FLULAVAL, FLUZONE (age 10 mo+) AND AFLURIA, (age 1 y+), PF, 0.5mL 09/13/2016    Influenza, High Dose (Fluzone 65 yrs and older) 10/03/2017, 08/17/2018    Influenza, Triv, inactivated, subunit, adjuvanted, IM (Fluad 65 yrs and older) 09/06/2019    Pneumococcal Conjugate 13-valent (Vena Koyanagi) 09/01/2015    Pneumococcal Polysaccharide ([id number]) 04/07/2015, 11/23/2020    Tdap (Boostrix, Adacel) 04/06/2009, 06/04/2019    Zoster Live (Zostavax) 08/13/2013    Zoster Recombinant (Shingrix) 08/17/2018, 02/18/2019, 03/18/2019, 05/24/2019         Review of Systems:  Review of Systems   Constitutional:  Negative for chills, fatigue and fever. HENT:  Negative for congestion, facial swelling and sore throat. Eyes: Negative. Respiratory:  Negative for cough, chest tightness and shortness of breath. Cardiovascular:  Negative for chest pain and palpitations. Gastrointestinal:  Negative for abdominal pain, blood in stool, diarrhea, nausea and vomiting. Endocrine: Negative. Genitourinary:  Positive for frequency and urgency. Negative for dysuria and hematuria. Musculoskeletal:  Positive for back pain. Negative for neck pain and neck stiffness. Neurological:  Negative for dizziness, syncope and weakness. Psychiatric/Behavioral:  Negative for agitation, behavioral problems, confusion and dysphoric mood. The patient is nervous/anxious. Physical Exam:   Vitals:    10/20/22 1411   BP: 124/70   Site: Left Upper Arm   Position: Sitting   Cuff Size: Large Adult   Pulse: 64   Resp: 18   Temp: 97.3 °F (36.3 °C)   TempSrc: Infrared   SpO2: 98%   Weight: 207 lb 3.2 oz (94 kg)   Height: 5' 4\" (1.626 m)     Body mass index is 35.57 kg/m². Wt Readings from Last 3 Encounters:   10/20/22 207 lb 3.2 oz (94 kg)   10/07/22 215 lb 9.6 oz (97.8 kg)   04/20/22 215 lb (97.5 kg)       BP Readings from Last 3 Encounters:   10/20/22 124/70   10/07/22 122/62   04/20/22 110/64       Physical Exam  Constitutional:       Appearance: Normal appearance. HENT:      Head: Normocephalic and atraumatic.       Right Ear: Tympanic membrane and external ear normal.      Left Ear: Tympanic membrane and external ear normal.      Nose: Nose normal.   Eyes:      Extraocular Movements: Extraocular movements intact. Conjunctiva/sclera: Conjunctivae normal.   Cardiovascular:      Rate and Rhythm: Normal rate and regular rhythm. Heart sounds: Normal heart sounds. No murmur heard. No gallop. Pulmonary:      Effort: Pulmonary effort is normal.      Breath sounds: Normal breath sounds. No wheezing, rhonchi or rales. Abdominal:      General: Abdomen is flat. There is no distension. Palpations: Abdomen is soft. Musculoskeletal:      Cervical back: Normal range of motion. Skin:     General: Skin is warm. Neurological:      General: No focal deficit present. Mental Status: She is alert and oriented to person, place, and time. Psychiatric:         Mood and Affect: Mood normal.         Behavior: Behavior normal.         Thought Content:  Thought content normal.            Lab Review:   Lab Results   Component Value Date/Time     04/08/2022 02:56 PM    K 4.8 04/08/2022 02:56 PM    K 3.9 05/29/2020 02:50 AM    CL 99 04/08/2022 02:56 PM    CO2 24 04/08/2022 02:56 PM    BUN 13 04/08/2022 02:56 PM    CREATININE 0.8 04/08/2022 02:56 PM    GLUCOSE 94 04/08/2022 02:56 PM    GLUCOSE 93 12/29/2016 12:06 PM    CALCIUM 9.9 04/08/2022 02:56 PM     Lab Results   Component Value Date/Time    WBC 3.9 07/15/2020 09:53 AM    HGB 12.2 11/29/2021 04:30 PM    HCT 37.5 11/29/2021 04:30 PM    MCV 87.8 07/15/2020 09:53 AM     07/15/2020 09:53 AM     Lab Results   Component Value Date/Time    CHOL 202 04/08/2022 02:56 PM    TRIG 116 04/08/2022 02:56 PM    HDL 61 04/08/2022 02:56 PM    HDL 53 08/10/2011 04:36 AM          Assessment/Plan:  Kathy Keane is a 81year old female with PMH of GERD, mild intermittent asthma, hyperlipidemia, hypothyroidism, osteoarthritis of both hips, osteopenia, degenerative disc disease, mixed incontinence, anxiety, and obesity. She is here for diet counseling and support.     Diagnoses and all orders for this visit:    Obesity (BMI 30-39.9)  -improving, patient has lost 10 lbs intentionally since 10/7 last visit  -patient has made dietary changes, reducing processed foods, meats and carbohydrate intake, encouraged to continue  -maintain hydration  oz water daily  -recommend 150 minutes moderate intensity exercise weekly  -can repeat A1C in 3 months    Lab Results   Component Value Date    LABA1C 5.6 10/07/2022    LABA1C 4.8 07/15/2020    LABA1C 5.4 07/16/2019        Hypothyroidism, unspecified type  -controlled  -continue synthroid 100 mcg daily  -repeat TSH in one year, last TSH 1.66 4/8/22    Pure hypercholesterolemia  -not at goal  -ACC/AHA guidelines recommending treating based on >8.5% ASCVD risk  -patient has failed atorvastatin and pravastatin due to myalgias  -does not wish to take statin  -recheck lipid panel at next visit to guide treatment course    The 10-year ASCVD risk score (Peng GARCIA, et al., 2019) is: 8.8%    Values used to calculate the score:      Age: 81 years      Sex: Female      Is Non- : No      Diabetic: No      Tobacco smoker: No      Systolic Blood Pressure: 772 mmHg      Is BP treated: No      HDL Cholesterol: 61 mg/dL      Total Cholesterol: 202 mg/dL    Lab Results   Component Value Date    CHOL 202 (H) 04/08/2022    TRIG 116 04/08/2022    HDL 61 (H) 04/08/2022    LDLCALC 118 (H) 04/08/2022    LABVLDL 23 04/08/2022        Gastroesophageal reflux disease without esophagitis  -well controlled  -continue omeprazole 40 mg daily  -check magnesium level at next visit  -follow up if new or worsening symptoms    Mixed stress and urge urinary incontinence  -controlled  -patient reports baseline increased frequency, likely due to optimal hydration  -denies dysuria  -continue oxybutynin 5 mg twice daily  -follow up if new or worsening symptoms    Other orders  -     Influenza, FLUAD, (age 72 y+), IM, Preservative Free, 0.5 mL      Health Maintenance Due:  Health Maintenance Due   Topic Date Due    Breast cancer screen  10/20/2022    Annual Wellness Visit (AWV)  11/02/2022          Health care decision maker:  up to date:  already done        Health Maintenance: (USPSTF Recommendations)  (F) Breast Cancer Screen: (40-49 (C), 50-74 biennial screening mammogram (B))  (F) Cervical Cancer Screen: (21-29 q3yr cytology alone; 30-65 q3yr cytology alone, q5yr with hrHPV alone, or q5yr cytology+hrHPV (A))  (M) Prostate Cancer Screen: (54-79 yo discuss benefits/harm, does not recommend testing PSA in men >75 yo (D):   (M) AAA Screen: (men 73-67 yo who has ever smoked (B), consider in nonsmokers if high risk):  CRC/Colonoscopy Screening: (adults 39-53 (B), 50-75 (A))  Lung Ca Screening: Annual LDCT (+smoker age 49-80, smoked within 15 years, total of 20 pack yr history (B)):  DEXA Screen: (women >65 and older, <65 if at risk/postmenopausal (B))  HIV Screen: (16-65 yr old, and all pregnant patients (A)): Hep C Screen: (18-79 yr old (B)):  HCC Screen: (all pts with cirrhosis and high risk Hep B (US q6 mo)):  Immunizations:    RTC:  Return in about 3 months (around 1/20/2023) for chronic condition f/u with Dr. Maddie Dodson or Dr. Lorelei Gunn. EMR Dragon/transcription disclaimer:  Much of this encounter note is electronic transcription/translation of spoken language to printed texts. The electronic translation of spoken language may be erroneous, or at times, nonsensical words or phrases may be inadvertently transcribed.   Although I have reviewed the note for such errors, some may still exist.

## 2022-10-21 ENCOUNTER — HOSPITAL ENCOUNTER (OUTPATIENT)
Dept: WOMENS IMAGING | Age: 72
Discharge: HOME | End: 2022-10-21
Payer: MEDICARE

## 2022-10-21 DIAGNOSIS — Z12.31 SCREENING MAMMOGRAM, ENCOUNTER FOR: ICD-10-CM

## 2022-10-21 PROCEDURE — 77063 BREAST TOMOSYNTHESIS BI: CPT

## 2022-11-02 ENCOUNTER — TELEPHONE (OUTPATIENT)
Dept: PRIMARY CARE CLINIC | Age: 72
End: 2022-11-02

## 2022-11-02 RX ORDER — METHOCARBAMOL 500 MG/1
TABLET, FILM COATED ORAL
Qty: 90 TABLET | Refills: 1 | Status: SHIPPED | OUTPATIENT
Start: 2022-11-02

## 2022-11-02 NOTE — PROGRESS NOTES
This medication was prescribed by a previous provider and is flagged as high risk in patients over the age of 72 years. I have discussed this medication with patient, and she states this is the only medication that helps her pain. I strongly urge the patient to wean off this medication, and we will further discuss this at next visit. I would like to refer to pain management for further pain control at next visit if patient is agreeable as this medication is not indicated for long term use. To clinical staff for TCM    Patient discharged Wen Pemberton 1/14/21    Deadline 1/18/21

## 2022-11-02 NOTE — TELEPHONE ENCOUNTER
Patient is calling her medication was called in incorrectly she is taking methocarbamol (ROBAXIN) 500 MG tablet 3 times a day, and we wrote it for 1 a day she is in need for this to be resent and corrected.     Please advise   Bimal Medrano

## 2022-11-02 NOTE — TELEPHONE ENCOUNTER
See previous message  I called and informed patient of previous message  Patient is very upset that the doctor is wanting to refer her to pain management   She was loud and stating she will not go to pain management and has been on this medication for years  Patient states she can not afford copays to attend Pain Management visits   She is highly upset about this message and states she can not use medical marijuana due to her COPD  and states that the medical marijuana and visit were a waste of time and money  She stated Dr. Dodd Doing never had a problem with this Rx   Concerned about future treatment and not getting this medication in the future   Patient is thankful for the refill that was sent to the pharmacy

## 2022-11-14 DIAGNOSIS — M47.817 LUMBOSACRAL SPONDYLOSIS WITHOUT MYELOPATHY: Chronic | ICD-10-CM

## 2022-11-14 DIAGNOSIS — E03.9 HYPOTHYROIDISM, UNSPECIFIED TYPE: ICD-10-CM

## 2022-11-14 DIAGNOSIS — M54.6 CHRONIC MIDLINE THORACIC BACK PAIN: ICD-10-CM

## 2022-11-14 DIAGNOSIS — G89.29 CHRONIC BILATERAL LOW BACK PAIN WITHOUT SCIATICA: ICD-10-CM

## 2022-11-14 DIAGNOSIS — G89.29 CHRONIC MIDLINE THORACIC BACK PAIN: ICD-10-CM

## 2022-11-14 DIAGNOSIS — M54.50 CHRONIC BILATERAL LOW BACK PAIN WITHOUT SCIATICA: ICD-10-CM

## 2022-11-14 DIAGNOSIS — M51.36 DDD (DEGENERATIVE DISC DISEASE), LUMBAR: ICD-10-CM

## 2022-11-14 RX ORDER — IBUPROFEN 400 MG/1
TABLET ORAL
Qty: 180 TABLET | Refills: 1 | Status: SHIPPED | OUTPATIENT
Start: 2022-11-14

## 2022-11-14 RX ORDER — LEVOTHYROXINE SODIUM 0.1 MG/1
TABLET ORAL
Qty: 90 TABLET | Refills: 1 | Status: SHIPPED | OUTPATIENT
Start: 2022-11-14

## 2022-11-14 NOTE — TELEPHONE ENCOUNTER
Refill Request       Last Seen: Last Seen Department: 10/20/2022  Last Seen by PCP: 10/7/2022    Last Written: 10/7/2022 for levothyroxine   last refill for Ibuprofen 9/20/2022    Next Appointment:   Future Appointments   Date Time Provider Lopez Coronel   1/19/2023  2:00 PM Tommie Elliott DO [facility]       Requested Prescriptions     Pending Prescriptions Disp Refills    levothyroxine (SYNTHROID) 100 MCG tablet 90 tablet 1     Sig: TAKE 1 TABLET DAILY

## 2022-12-05 NOTE — TELEPHONE ENCOUNTER
Refill Request       Last Seen: Last Seen Department: 10/20/2022  Last Seen by PCP: 10/7/2022    Last Written: 11/02/2022    Next Appointment:   Future Appointments   Date Time Provider Lopez Coronel   1/19/2023  2:00 PM Tommie Ellis DO [facility] AND [facility]             Requested Prescriptions     Pending Prescriptions Disp Refills    methocarbamol (ROBAXIN) 500 MG tablet [Pharmacy Med Name: METHOCARBAMOL 500MG TABLETS] 90 tablet 1     Sig: TAKE 1 TABLET BY MOUTH THREE TIMES DAILY

## 2022-12-06 ENCOUNTER — TELEPHONE (OUTPATIENT)
Dept: PRIMARY CARE CLINIC | Age: 72
End: 2022-12-06

## 2022-12-06 RX ORDER — METHOCARBAMOL 500 MG/1
TABLET, FILM COATED ORAL
Qty: 90 TABLET | Refills: 1 | Status: SHIPPED | OUTPATIENT
Start: 2022-12-06

## 2022-12-06 NOTE — TELEPHONE ENCOUNTER
Patient is calling her insurance changed and our office is no longer accepted. Patient is needing a refill of methocarbamol (ROBAXIN) 500 MG tablet she needed the one that was called in yesterday to have two refill and not one to get her thru until she is seen at new office in February.   Stephanie Jones [phone] (home)

## 2023-01-03 DIAGNOSIS — K21.9 GASTROESOPHAGEAL REFLUX DISEASE WITHOUT ESOPHAGITIS: ICD-10-CM

## 2023-01-04 RX ORDER — PANTOPRAZOLE SODIUM 40 MG/1
TABLET, DELAYED RELEASE ORAL
Qty: 90 TABLET | Refills: 0 | Status: SHIPPED | OUTPATIENT
Start: 2023-01-04

## 2023-01-04 NOTE — TELEPHONE ENCOUNTER
Refill Request   Current Outpatient Medications   Medication Sig Dispense Refill    methocarbamol (ROBAXIN) 500 MG tablet TAKE 1 TABLET BY MOUTH THREE TIMES DAILY 90 tablet 1    levothyroxine (SYNTHROID) 100 MCG tablet TAKE 1 TABLET DAILY 90 tablet 1    ibuprofen (ADVIL;MOTRIN) 400 MG tablet TAKE 1 TO 2 TABLETS 3 TIMESA DAY AS NEEDED WITH FOOD 180 tablet 1    MULTIPLE VITAMIN PO       Omega-3 Fatty Acids (FISH OIL) 1200 MG CAPS Take by mouth      Simethicone (GAS RELIEF EXTRA STRENGTH PO) Take by mouth Gas x oct      GLUCOSAMINE-CHONDROIT-CALCIUM PO Take 1,200 mg by mouth      zinc 50 MG TABS tablet Take 50 mg by mouth daily      Saline (SIMPLY SALINE) 0.9 % AERS by Nasal route      Isopropyl Alcohol (AURO DRI SWIMMERS EARS OT) Place in ear(s)      oxybutynin (DITROPAN) 5 MG tablet TAKE 1 TABLET TWICE A  tablet 1    montelukast (SINGULAIR) 10 MG tablet TAKE 1 TABLET NIGHTLY 90 tablet 1    omeprazole (PRILOSEC) 40 MG delayed release capsule Take 1 capsule by mouth daily 90 capsule 1    cetirizine (ZYRTEC) 10 MG tablet TAKE 1 TABLET BY MOUTH EVERY NIGHT 90 tablet 1    lactase (LACTAID) 3000 units tablet Take 1 tablet by mouth 3 times daily (with meals) 270 tablet 1    fluticasone (FLONASE) 50 MCG/ACT nasal spray SHAKE LIQUID AND USE 2 SPRAYS IN EACH NOSTRIL DAILY 48 g 1    albuterol sulfate HFA (PROVENTIL;VENTOLIN;PROAIR) 108 (90 Base) MCG/ACT inhaler USE 2 INHALATIONS ORALLY EVERY 6 HOURS AS NEEDED FOR SHORTNESS OF BREATH OR WHEEZING 54 g 1    pantoprazole (PROTONIX) 40 MG tablet TAKE 1 TABLET EVERY MORNINGBEFORE BREAKFAST 90 tablet 0    Evolocumab (REPATHA) 140 MG/ML SOSY Inject 1 mL into the skin every 14 days 2.1 mL 11    valACYclovir (VALTREX) 500 MG tablet Take 500 mg by mouth in the morning, at noon, and at bedtime      Lift Chair MISC by Does not apply route 1 each 0    baclofen (LIORESAL) 10 MG tablet Take 1 tablet by mouth 3 times daily 270 tablet 3    Coenzyme Q10 200 MG CAPS Take 1 tablet by mouth daily 90 capsule 3    Estradiol (VAGIFEM) 10 MCG TABS vaginal tablet       folic acid (FOLVITE) 687 MCG tablet Take 400 mcg by mouth daily      Ascorbic Acid (VITAMIN C) 1000 MG tablet Take 1,000 mg by mouth daily      Calcium Carbonate Antacid 1000 MG CHEW Take by mouth      [employer]S VITAMIN D3 1000 units CAPS TAKE 1 SOFTGEL BY MOUTH DAILY 90 capsule 1    imiquimod (ALDARA) 5 % cream Apply topically three times a week Indications: [facility] ordering Apply topically three times a week. 12 each 3    psyllium (KONSYL) 28.3 % POWD powder Take 1 each by mouth daily      hydrocortisone (ANUSOL-HC) 2.5 % rectal cream Place rectally 2 times daily. 1 Tube 1    acetaminophen (TYLENOL) 325 MG tablet Take 650 mg by mouth every 6 hours as needed for Pain       No current facility-administered medications for this visit.          Last Seen: Last Seen Department: 10/20/2022  Last Seen by PCP: 10/7/2022    Last Written: 9/13/22    Next Appointment:   Future Appointments   Date Time Provider Lopez Coronel   1/19/2023  2:00 PM Tommie Carrasco DO [facility] AND [facility]         Requested Prescriptions     Pending Prescriptions Disp Refills    pantoprazole (PROTONIX) 40 MG tablet [Pharmacy Med Name: PANTOPRAZOLE 40MG TABLETS] 90 tablet 0     Sig: TAKE 1 TABLET BY MOUTH EVERY MORNING BEFORE BREAKFAST

## 2023-02-09 ENCOUNTER — APPOINTMENT (OUTPATIENT)
Dept: GENERAL RADIOLOGY | Age: 73
End: 2023-02-09
Payer: MEDICARE

## 2023-02-09 ENCOUNTER — APPOINTMENT (OUTPATIENT)
Dept: CT IMAGING | Age: 73
End: 2023-02-09
Payer: MEDICARE

## 2023-02-09 ENCOUNTER — APPOINTMENT (OUTPATIENT)
Dept: MRI IMAGING | Age: 73
End: 2023-02-09
Payer: MEDICARE

## 2023-02-09 ENCOUNTER — HOSPITAL ENCOUNTER (OUTPATIENT)
Age: 73
Setting detail: OBSERVATION
Discharge: HOME | End: 2023-02-10
Attending: EMERGENCY MEDICINE | Admitting: INTERNAL MEDICINE
Payer: MEDICARE

## 2023-02-09 DIAGNOSIS — R42 DIZZINESS: Primary | ICD-10-CM

## 2023-02-09 PROBLEM — R29.90 STROKE-LIKE SYMPTOMS: Status: ACTIVE | Noted: 2023-02-09

## 2023-02-09 LAB
A/G RATIO: 1.8 (ref 1.1–2.2)
ALBUMIN SERPL-MCNC: 4.6 G/DL (ref 3.4–5)
ALP BLD-CCNC: 58 U/L (ref 40–129)
ALT SERPL-CCNC: 35 U/L (ref 10–40)
ANION GAP SERPL CALCULATED.3IONS-SCNC: 9 MMOL/L (ref 3–16)
AST SERPL-CCNC: 26 U/L (ref 15–37)
BASOPHILS ABSOLUTE: 0 K/UL (ref 0–0.2)
BASOPHILS RELATIVE PERCENT: 0.7 %
BILIRUB SERPL-MCNC: 0.3 MG/DL (ref 0–1)
BILIRUBIN URINE: NEGATIVE
BLOOD, URINE: NEGATIVE
BUN BLDV-MCNC: 17 MG/DL (ref 7–20)
CALCIUM SERPL-MCNC: 10.1 MG/DL (ref 8.3–10.6)
CHLORIDE BLD-SCNC: 102 MMOL/L (ref 99–110)
CHOLESTEROL, TOTAL: 172 MG/DL (ref 0–199)
CLARITY: CLEAR
CO2: 27 MMOL/L (ref 21–32)
COLOR: YELLOW
CREAT SERPL-MCNC: 0.9 MG/DL (ref 0.6–1.2)
EKG ATRIAL RATE: 62 BPM
EKG DIAGNOSIS: NORMAL
EKG P AXIS: 73 DEGREES
EKG P-R INTERVAL: 230 MS
EKG Q-T INTERVAL: 374 MS
EKG QRS DURATION: 82 MS
EKG QTC CALCULATION (BAZETT): 379 MS
EKG R AXIS: 35 DEGREES
EKG T AXIS: 48 DEGREES
EKG VENTRICULAR RATE: 62 BPM
EOSINOPHILS ABSOLUTE: 0.1 K/UL (ref 0–0.6)
EOSINOPHILS RELATIVE PERCENT: 1.5 %
EPITHELIAL CELLS, UA: ABNORMAL /HPF (ref 0–5)
GFR SERPL CREATININE-BSD FRML MDRD: >60 ML/MIN/{1.73_M2}
GLUCOSE BLD-MCNC: 114 MG/DL (ref 70–99)
GLUCOSE URINE: NEGATIVE MG/DL
HCT VFR BLD CALC: 38.8 % (ref 36–48)
HDLC SERPL-MCNC: 57 MG/DL (ref 40–60)
HEMOGLOBIN: 13.2 G/DL (ref 12–16)
KETONES, URINE: NEGATIVE MG/DL
LDL CHOLESTEROL CALCULATED: 97 MG/DL
LEUKOCYTE ESTERASE, URINE: ABNORMAL
LYMPHOCYTES ABSOLUTE: 2.4 K/UL (ref 1–5.1)
LYMPHOCYTES RELATIVE PERCENT: 42.4 %
MCH RBC QN AUTO: 29.1 PG (ref 26–34)
MCHC RBC AUTO-ENTMCNC: 34.1 G/DL (ref 31–36)
MCV RBC AUTO: 85.3 FL (ref 80–100)
MICROSCOPIC EXAMINATION: YES
MONOCYTES ABSOLUTE: 0.5 K/UL (ref 0–1.3)
MONOCYTES RELATIVE PERCENT: 9.1 %
MUCUS: ABNORMAL /LPF
NEUTROPHILS ABSOLUTE: 2.6 K/UL (ref 1.7–7.7)
NEUTROPHILS RELATIVE PERCENT: 46.3 %
NITRITE, URINE: NEGATIVE
PDW BLD-RTO: 14.6 % (ref 12.4–15.4)
PH UA: 6 (ref 5–8)
PLATELET # BLD: 232 K/UL (ref 135–450)
PMV BLD AUTO: 7.7 FL (ref 5–10.5)
POTASSIUM REFLEX MAGNESIUM: 3.7 MMOL/L (ref 3.5–5.1)
PROTEIN UA: NEGATIVE MG/DL
RBC # BLD: 4.55 M/UL (ref 4–5.2)
RBC UA: ABNORMAL /HPF (ref 0–4)
SODIUM BLD-SCNC: 138 MMOL/L (ref 136–145)
SPECIFIC GRAVITY UA: 1.01 (ref 1–1.03)
TOTAL PROTEIN: 7.2 G/DL (ref 6.4–8.2)
TRIGL SERPL-MCNC: 89 MG/DL (ref 0–150)
TROPONIN: <0.01 NG/ML
TSH REFLEX: 3.44 UIU/ML (ref 0.27–4.2)
URINE REFLEX TO CULTURE: ABNORMAL
URINE TYPE: ABNORMAL
UROBILINOGEN, URINE: 0.2 E.U./DL
VLDLC SERPL CALC-MCNC: 18 MG/DL
WBC # BLD: 5.6 K/UL (ref 4–11)
WBC UA: ABNORMAL /HPF (ref 0–5)

## 2023-02-09 PROCEDURE — 84484 ASSAY OF TROPONIN QUANT: CPT

## 2023-02-09 PROCEDURE — 70498 CT ANGIOGRAPHY NECK: CPT

## 2023-02-09 PROCEDURE — 2580000003 HC RX 258: Performed by: PHYSICIAN ASSISTANT

## 2023-02-09 PROCEDURE — 70450 CT HEAD/BRAIN W/O DYE: CPT

## 2023-02-09 PROCEDURE — 93010 ELECTROCARDIOGRAM REPORT: CPT | Performed by: INTERNAL MEDICINE

## 2023-02-09 PROCEDURE — 6360000004 HC RX CONTRAST MEDICATION: Performed by: PHYSICIAN ASSISTANT

## 2023-02-09 PROCEDURE — 85025 COMPLETE CBC W/AUTO DIFF WBC: CPT

## 2023-02-09 PROCEDURE — 6370000000 HC RX 637 (ALT 250 FOR IP)

## 2023-02-09 PROCEDURE — G0378 HOSPITAL OBSERVATION PER HR: HCPCS

## 2023-02-09 PROCEDURE — 97530 THERAPEUTIC ACTIVITIES: CPT

## 2023-02-09 PROCEDURE — 83036 HEMOGLOBIN GLYCOSYLATED A1C: CPT

## 2023-02-09 PROCEDURE — 80061 LIPID PANEL: CPT

## 2023-02-09 PROCEDURE — 81001 URINALYSIS AUTO W/SCOPE: CPT

## 2023-02-09 PROCEDURE — 80053 COMPREHEN METABOLIC PANEL: CPT

## 2023-02-09 PROCEDURE — 84443 ASSAY THYROID STIM HORMONE: CPT

## 2023-02-09 PROCEDURE — 71046 X-RAY EXAM CHEST 2 VIEWS: CPT

## 2023-02-09 PROCEDURE — 97161 PT EVAL LOW COMPLEX 20 MIN: CPT

## 2023-02-09 PROCEDURE — 93005 ELECTROCARDIOGRAM TRACING: CPT | Performed by: PHYSICIAN ASSISTANT

## 2023-02-09 PROCEDURE — 99285 EMERGENCY DEPT VISIT HI MDM: CPT

## 2023-02-09 RX ORDER — OXYBUTYNIN CHLORIDE 5 MG/1
5 TABLET ORAL 2 TIMES DAILY
Status: DISCONTINUED | OUTPATIENT
Start: 2023-02-09 | End: 2023-02-10 | Stop reason: HOSPADM

## 2023-02-09 RX ORDER — METHOCARBAMOL 500 MG/1
500 TABLET, FILM COATED ORAL 3 TIMES DAILY
Status: DISCONTINUED | OUTPATIENT
Start: 2023-02-09 | End: 2023-02-10 | Stop reason: HOSPADM

## 2023-02-09 RX ORDER — PANTOPRAZOLE SODIUM 40 MG/1
40 TABLET, DELAYED RELEASE ORAL
Status: DISCONTINUED | OUTPATIENT
Start: 2023-02-10 | End: 2023-02-09

## 2023-02-09 RX ORDER — 0.9 % SODIUM CHLORIDE 0.9 %
1000 INTRAVENOUS SOLUTION INTRAVENOUS ONCE
Status: COMPLETED | OUTPATIENT
Start: 2023-02-09 | End: 2023-02-09

## 2023-02-09 RX ORDER — AMOXICILLIN 500 MG
1200 CAPSULE ORAL DAILY
Status: DISCONTINUED | OUTPATIENT
Start: 2023-02-09 | End: 2023-02-09 | Stop reason: CLARIF

## 2023-02-09 RX ORDER — ONDANSETRON 4 MG/1
4 TABLET, ORALLY DISINTEGRATING ORAL EVERY 8 HOURS PRN
Status: DISCONTINUED | OUTPATIENT
Start: 2023-02-09 | End: 2023-02-10 | Stop reason: HOSPADM

## 2023-02-09 RX ORDER — LEVOTHYROXINE SODIUM 0.1 MG/1
100 TABLET ORAL DAILY
Status: DISCONTINUED | OUTPATIENT
Start: 2023-02-09 | End: 2023-02-10 | Stop reason: HOSPADM

## 2023-02-09 RX ORDER — ASPIRIN 81 MG/1
81 TABLET ORAL DAILY
Status: DISCONTINUED | OUTPATIENT
Start: 2023-02-09 | End: 2023-02-10 | Stop reason: HOSPADM

## 2023-02-09 RX ORDER — BACLOFEN 10 MG/1
10 TABLET ORAL 3 TIMES DAILY
Status: DISCONTINUED | OUTPATIENT
Start: 2023-02-09 | End: 2023-02-10 | Stop reason: HOSPADM

## 2023-02-09 RX ORDER — ASCORBIC ACID 500 MG
1000 TABLET ORAL DAILY
Status: DISCONTINUED | OUTPATIENT
Start: 2023-02-09 | End: 2023-02-10 | Stop reason: HOSPADM

## 2023-02-09 RX ORDER — FOLIC ACID 1 MG/1
500 TABLET ORAL DAILY
Status: DISCONTINUED | OUTPATIENT
Start: 2023-02-09 | End: 2023-02-10 | Stop reason: HOSPADM

## 2023-02-09 RX ORDER — POLYETHYLENE GLYCOL 3350 17 G/17G
17 POWDER, FOR SOLUTION ORAL DAILY PRN
Status: DISCONTINUED | OUTPATIENT
Start: 2023-02-09 | End: 2023-02-10 | Stop reason: HOSPADM

## 2023-02-09 RX ORDER — ONDANSETRON 2 MG/ML
4 INJECTION INTRAMUSCULAR; INTRAVENOUS EVERY 6 HOURS PRN
Status: DISCONTINUED | OUTPATIENT
Start: 2023-02-09 | End: 2023-02-10 | Stop reason: HOSPADM

## 2023-02-09 RX ORDER — ENOXAPARIN SODIUM 100 MG/ML
40 INJECTION SUBCUTANEOUS DAILY
Status: DISCONTINUED | OUTPATIENT
Start: 2023-02-09 | End: 2023-02-10 | Stop reason: HOSPADM

## 2023-02-09 RX ORDER — FLUTICASONE PROPIONATE 50 MCG
1 SPRAY, SUSPENSION (ML) NASAL DAILY
Status: DISCONTINUED | OUTPATIENT
Start: 2023-02-09 | End: 2023-02-10 | Stop reason: HOSPADM

## 2023-02-09 RX ORDER — ASPIRIN 300 MG/1
300 SUPPOSITORY RECTAL DAILY
Status: DISCONTINUED | OUTPATIENT
Start: 2023-02-09 | End: 2023-02-10 | Stop reason: HOSPADM

## 2023-02-09 RX ORDER — ALBUTEROL SULFATE 90 UG/1
2 AEROSOL, METERED RESPIRATORY (INHALATION) EVERY 4 HOURS PRN
Status: DISCONTINUED | OUTPATIENT
Start: 2023-02-09 | End: 2023-02-10 | Stop reason: HOSPADM

## 2023-02-09 RX ORDER — VITAMIN B COMPLEX
1000 TABLET ORAL DAILY
Status: DISCONTINUED | OUTPATIENT
Start: 2023-02-09 | End: 2023-02-10 | Stop reason: HOSPADM

## 2023-02-09 RX ADMIN — METHOCARBAMOL 500 MG: 500 TABLET ORAL at 21:21

## 2023-02-09 RX ADMIN — IOPAMIDOL 75 ML: 755 INJECTION, SOLUTION INTRAVENOUS at 10:53

## 2023-02-09 RX ADMIN — OXYBUTYNIN CHLORIDE 5 MG: 5 TABLET ORAL at 21:21

## 2023-02-09 RX ADMIN — SODIUM CHLORIDE 1000 ML: 9 INJECTION, SOLUTION INTRAVENOUS at 09:38

## 2023-02-09 RX ADMIN — ASPIRIN 81 MG: 81 TABLET, COATED ORAL at 19:07

## 2023-02-09 RX ADMIN — FLUTICASONE PROPIONATE 1 SPRAY: 50 SPRAY, METERED NASAL at 17:40

## 2023-02-09 ASSESSMENT — ENCOUNTER SYMPTOMS
DIARRHEA: 0
EYE PAIN: 0
SHORTNESS OF BREATH: 0
COUGH: 0
VOMITING: 0
BACK PAIN: 1
ABDOMINAL PAIN: 0
NAUSEA: 0

## 2023-02-09 ASSESSMENT — PAIN - FUNCTIONAL ASSESSMENT: PAIN_FUNCTIONAL_ASSESSMENT: NONE - DENIES PAIN

## 2023-02-09 NOTE — ACP (ADVANCE CARE PLANNING)
Advance Care Planning     General Advance Care Planning (ACP) Conversation    Date of Conversation: 2/9/2023  Conducted with: Patient with Decision Making Capacity    Healthcare Decision Maker:    Primary Decision Maker: Andrez Foster - [facility] - [phone]  Click here to complete Healthcare Decision Makers including selection of the Healthcare Decision Maker Relationship (ie \"Primary\"). Today we documented Decision Maker(s) consistent with Legal Next of Kin hierarchy.     Content/Action Overview:  Has NO ACP documents/care preferences - information provided, considering goals and options          Length of Voluntary ACP Conversation in minutes:  <16 minutes (Non-Billable)    SUDARSHAN Arnett

## 2023-02-09 NOTE — CARE COORDINATION
Case Management Assessment  Initial Evaluation    Date/Time of Evaluation: 2/9/2023 1:07 PM  Assessment Completed by: SUDARSHAN Salguero    If patient is discharged prior to next notation, then this note serves as note for discharge by case management. Patient Name: Yamilka Connell                   YOB: 1950  Diagnosis: No admission diagnoses are documented for this encounter. Date / Time: 2/9/2023  8:52 AM    Patient Admission Status: Emergency   Readmission Risk (Low < 19, Mod (19-27), High > 27): No data recorded  Current PCP: Toshia Blancas MD  PCP verified by CM? (P) Yes    Chart Reviewed: Yes      History Provided by: (P) Patient  Patient Orientation: (P) Alert and Oriented    Patient Cognition: (P) Alert    Hospitalization in the last 30 days (Readmission):  No    If yes, Readmission Assessment in  Navigator will be completed.     Advance Directives:      Code Status: Prior   Patient's Primary Decision Maker is: (P) Legal Next of Kin    Primary Decision MakerCheelian Rahman  Child - [phone]    Primary Decision Maker: Clarita Martinez  Child - [phone]    Discharge Planning:    Patient lives with: (P) Alone Type of Home: (P) House  Primary Care Giver: (P) Self  Patient Support Systems include: (P) Children   Current Financial resources: (P) Financial Counseling  Current community resources: (P) None  Current services prior to admission: (P) None            Current DME:              Type of Home Care services:       ADLS  Prior functional level: (P) Independent in ADLs/IADLs  Current functional level: (P) Independent in ADLs/IADLs    PT AM-PAC:   /24  OT AM-PAC:   /24    Family can provide assistance at DC: (P) Yes  Would you like Case Management to discuss the discharge plan with any other family members/significant others, and if so, who? (P) No  Plans to Return to Present Housing: (P) Yes  Other Identified Issues/Barriers to RETURNING to current housing: none noted  Potential Assistance needed at discharge: (P) N/A            Potential DME:    Patient expects to discharge to:    Plan for transportation at discharge:      Financial    Payor: Ramiro Dyson / Plan: [employer] 1978 / Product Type: *No Product type* /     Does insurance require precert for SNF: Yes    Potential assistance Purchasing Medications: (P) No  Meds-to-Beds request:        [address], [address] [place] [address],[address] [phone] Joan Allen [address] [address]  Phone: [phone] Fax: [phone] [facility] [address] [phone]. [phone] Joan Allen [phone]   Tigist Ruiz [id number]  Phone: [phone] Fax: [phone]      Notes:    Factors facilitating achievement of predicted outcomes: Motivated and Cooperative    Barriers to discharge: Medical complications    Additional Case Management Notes: Referred to patient for d/c planning. Spoke to patient. Patient is a 72year old female admitted for dizzy. Patient lives at home alone. Patient reports she is independent in ADLs. Patient drives. Patient currently denies d/c needs. The Plan for Transition of Care is related to the following treatment goals of No admission diagnoses are documented for this encounter. IF APPLICABLE: The Patient and/or patient representative Pallavi Barfield and her family were provided with a choice of provider and agrees with the discharge plan. Freedom of choice list with basic dialogue that supports the patient's individualized plan of care/goals and shares the quality data associated with the providers was provided to:     Patient Representative Name:       The Patient and/or Patient Representative Agree with the Discharge Plan?       SUDARSHAN Catalan, PAKO-S  Case Management Department  Ph: [phone] Fax: [phone]

## 2023-02-09 NOTE — PROGRESS NOTES
Physical Therapy  Facility/Department: [facility]  ED  Physical Therapy Initial Assessment/ Treatment/Discharge    Name: Glenis Garcia  : 1950  MRN: [id number]  Date of Service: 2023    Discharge Recommendations:  Home with assist PRN   PT Equipment Recommendations  Equipment Needed: No      Patient Diagnosis(es): The encounter diagnosis was Dizziness. Past Medical History:  has a past medical history of Allergic rhinitis, Anxiety, Asthma, Bipolar 1 disorder ([address] Chronic low back pain, COPD (chronic obstructive pulmonary disease) ([address] Decreased bone density, First degree hemorrhoids, Former smoker, GERD (gastroesophageal reflux disease), H/O vulvar dysplasia, Hyperlipidemia, Hypothyroid, Hypothyroidism, Insomnia, Obesity (BMI 30-39.9), Osteoarthritis, Prolonged emergence from general anesthesia, and Urinary incontinence. Past Surgical History:  has a past surgical history that includes Hysterectomy (); Colonoscopy (2016); Tubal ligation (); other surgical history (2021); Cystoscopy (N/A, 2021); and Ovary removal.    Assessment   Body Structures, Functions, Activity Limitations Requiring Skilled Therapeutic Intervention: Decreased balance  Assessment: 71 yo female on OBS for dizziness, \"feeling wobly\", stroke like symptoms. PLOF: pt lives alone in apt with level entry, was indep in amb without device, ADLs, drives. Pt able to demonstrate baseline independence with mobility and amb during session. Strength and balance WFL. Pt denies sensory changes, denies pain, denies any functional needs.  No additional PT planned  Treatment Diagnosis: Decreased high level balance  Therapy Prognosis: Good  Decision Making: Low Complexity  Barriers to Learning: none  Activity Tolerance  Activity Tolerance: Patient tolerated evaluation without incident;Patient tolerated treatment well     Plan   Physcial Therapy Plan  General Plan: Discharge  Current Treatment Recommendations: Safety education & training  Safety Devices  Type of Devices:  All jovanny prominences offloaded, Nurse notified, All fall risk precautions in place, Call light within reach (left in ED-21, side rails up, RN aware)     Restrictions    General precautions    Subjective   Chart Reviewed: Yes  Patient assessed for rehabilitation services?: Yes  Family / Caregiver Present: No  Referral Date : 02/09/23  Diagnosis: stroke like symptoms  Follows Commands: Within Functional Limits  Comments: Christal Flores RN cleared pt for therapy, pt resting in bed on approach  Subjective  Subjective: Pt agrees to therapy     Pain: pt reports chronic back problems, but denies pain during therapy session         Social/Functional History  Social/Functional History  Lives With: Alone  Type of Home: Apartment  Home Layout: One level  Home Access: Level entry  Bathroom Shower/Tub: Tub/Shower unit  Bathroom Toilet: Standard  Bathroom Equipment: Grab bars in shower, Shower chair  Has the patient had two or more falls in the past year or any fall with injury in the past year?: No  ADL Assistance: Independent  Homemaking Assistance: Independent  Ambulation Assistance: Independent  Transfer Assistance: Independent  Active : Yes  Vision/Hearing  Vision  Vision: Impaired  Vision Exceptions: Wears glasses at all times  Hearing  Hearing: Within functional limits    Cognition   Orientation  Overall Orientation Status: Within Functional Limits  Cognition  Overall Cognitive Status: WFL     Objective   Heart Rate: 67  Heart Rate Source: Monitor  BP: (!) 128/53  BP Method: Automatic  Patient Position: Supine  MAP (Calculated): 78  Resp: 12  SpO2: 100 %  O2 Device: None (Room air)        Gross Assessment  AROM: Within functional limits  Strength: Generally decreased, functional           Bed mobility  Supine to Sit: Modified independent  Sit to Supine: Modified independent  Transfers  Sit to Stand: Independent  Stand to Sit: Independent  Ambulation  Surface: Level tile  Device: No Device  Assistance: Independent  Quality of Gait: fast pace, mild path deviation, no loss of balance, recipricol pattern  Distance: 150 ft     Balance  Comments: No loss of balance turning in [place], bending as if to  object from floor or reaching overhead.   Exercise Treatment: Sit><stnd 5 x in succession  Standing B heel raise 10 x  Standing slow march 5 x B        AM-PAC Score  AM-PAC Inpatient Mobility Raw Score : 24 (02/09/23 1516)  AM-PAC Inpatient T-Scale Score : 61.14 (02/09/23 1516)  Mobility Inpatient CMS 0-100% Score: 0 (02/09/23 1516)  Mobility Inpatient CMS G-Code Modifier : [facility] (02/09/23 1516)        Goals  Short Term Goals  Time Frame for Short Term Goals: 1 visit  Short Term Goal 1: pt to perform sUpine><sit><stand modified indep: MET  Short Term Goal 2: pt to amb 15o ft no device indep without loss of balance: MET  Short Term Goal 3: pt to participate in basic  functional LE Ex: MET  Patient Goals   Patient Goals : No goals stated, pt feels she is at baseline       Education  Patient Education  Education Given To: Patient  Education Provided: Role of Therapy;Plan of Care    Therapy Time   Individual Concurrent Group Co-treatment   Time In 1500         Time Out 1525         Minutes 25         Timed Code Treatment Minutes: Brady Hirsch PT

## 2023-02-09 NOTE — ED PROVIDER NOTES
[facility]  ED  EMERGENCY DEPARTMENT ENCOUNTER        Pt Name: Rickie Almonte  MRN: [id number]  Armsmichaelgfsarai 1950  Date of evaluation: 2/9/2023  Provider: OSKAR Holt  PCP: Estela Andrade MD  Note Started: 12:18 PM EST 2/9/23       I have seen and evaluated this patient with my supervising physician Alexa Benjamin MD.      [address] [place] [address]       Chief Complaint   Patient presents with    Dizziness     Started yesterday morning, this is second episode in the past few weeks, has been on a diet the past 5 months, just found out she has a \" slow thyroid\"        HISTORY OF PRESENT ILLNESS: 1 or more Elements     History From: patient      Rickie Almonte is a 72 y.o. female who presents to the emergency department for evaluation of dizziness. Patient states that she has felt intermittently dizzy over the last 2 weeks but has been more constant since yesterday. Dizziness is described as feeling off balance with walking. Denies near syncope although she did feel lightheaded when standing up after yoga yesterday. The patient denies fever or chills, chest pain, shortness of breath, abdominal pain, nausea, vomiting, diarrhea. No recent travel, exposure to sick contacts, or recent antibiotics. No other acute concerns, associated symptoms or modifying factors. Has been following more a \"keto\" diet over the past several months and also states she was recently diagnosed with hypothyroidism. Nursing Notes were all reviewed and agreed with or any disagreements were addressed in the HPI. REVIEW OF SYSTEMS :      Review of Systems   Constitutional:  Negative for chills, fatigue and fever. Eyes:  Negative for pain. Respiratory:  Negative for cough and shortness of breath. Cardiovascular:  Negative for chest pain. Gastrointestinal:  Negative for abdominal pain, diarrhea, nausea and vomiting. Genitourinary:  Negative for dysuria. Musculoskeletal:  Positive for back pain (chronic). Negative for neck pain and neck stiffness. Skin:  Negative for rash. Neurological:  Positive for dizziness and light-headedness. Negative for headaches. Psychiatric/Behavioral:  Negative for confusion. Positives and Pertinent negatives as per HPI. SURGICAL HISTORY     Past Surgical History:   Procedure Laterality Date    COLONOSCOPY  18 Apr 2016    Diverticulosis, hemorrhoid    CYSTOSCOPY N/A 12/13/2021    CYSTOSCOPY,  URETHRAL DILATATION performed by Samantha Chahal MD at [address] [facility] ([facility])  2001    Partial ? Endometriosis - Benign    OTHER SURGICAL HISTORY  12/13/2021      cystoscopy, urethral dilation    OVARY REMOVAL      TUBAL LIGATION  1981       CURRENTMEDICATIONS       Previous Medications    ACETAMINOPHEN (TYLENOL) 325 MG TABLET    Take 650 mg by mouth every 6 hours as needed for Pain    ALBUTEROL SULFATE HFA (PROVENTIL;VENTOLIN;PROAIR) 108 (90 BASE) MCG/ACT INHALER    USE 2 INHALATIONS ORALLY EVERY 6 HOURS AS NEEDED FOR SHORTNESS OF BREATH OR WHEEZING    ASCORBIC ACID (VITAMIN C) 1000 MG TABLET    Take 1,000 mg by mouth daily    BACLOFEN (LIORESAL) 10 MG TABLET    Take 1 tablet by mouth 3 times daily    CALCIUM CARBONATE ANTACID 1000 MG CHEW    Take by mouth    CETIRIZINE (ZYRTEC) 10 MG TABLET    TAKE 1 TABLET BY MOUTH EVERY NIGHT    COENZYME Q10 200 MG CAPS    Take 1 tablet by mouth daily    [employer] VITAMIN D3 1000 UNITS CAPS    TAKE 1 SOFTGEL BY MOUTH DAILY    ESTRADIOL (VAGIFEM) 10 MCG TABS VAGINAL TABLET        EVOLOCUMAB (REPATHA) 140 MG/ML SOSY    Inject 1 mL into the skin every 14 days    FLUTICASONE (FLONASE) 50 MCG/ACT NASAL SPRAY    SHAKE LIQUID AND USE 2 SPRAYS IN EACH NOSTRIL DAILY    FOLIC ACID (FOLVITE) 500 MCG TABLET    Take 400 mcg by mouth daily    GLUCOSAMINE-CHONDROIT-CALCIUM PO    Take 1,200 mg by mouth    HYDROCORTISONE (ANUSOL-HC) 2.5 % RECTAL CREAM    Place rectally 2 times daily.     IBUPROFEN (ADVIL;MOTRIN) 400 MG TABLET    TAKE 1 TO 2 TABLETS 3 TIMESA DAY AS NEEDED WITH FOOD    IMIQUIMOD (ALDARA) 5 % CREAM    Apply topically three times a week Indications: [facility] ordering Apply topically three times a week. ISOPROPYL ALCOHOL (AURO DRI SWIMMERS EARS OT)    Place in ear(s)    LACTASE (LACTAID) 3000 UNITS TABLET    Take 1 tablet by mouth 3 times daily (with meals)    LEVOTHYROXINE (SYNTHROID) 100 MCG TABLET    TAKE 1 TABLET DAILY    LIFT CHAIR MISC    by Does not apply route    METHOCARBAMOL (ROBAXIN) 500 MG TABLET    TAKE 1 TABLET BY MOUTH THREE TIMES DAILY    MONTELUKAST (SINGULAIR) 10 MG TABLET    TAKE 1 TABLET NIGHTLY    MULTIPLE VITAMIN PO        OMEGA-3 FATTY ACIDS (FISH OIL) 1200 MG CAPS    Take by mouth    OMEPRAZOLE (PRILOSEC) 40 MG DELAYED RELEASE CAPSULE    Take 1 capsule by mouth daily    OXYBUTYNIN (DITROPAN) 5 MG TABLET    TAKE 1 TABLET TWICE A DAY    PANTOPRAZOLE (PROTONIX) 40 MG TABLET    TAKE 1 TABLET BY MOUTH EVERY MORNING BEFORE BREAKFAST    PSYLLIUM (KONSYL) 28.3 % POWD POWDER    Take 1 each by mouth daily    SALINE (SIMPLY SALINE) 0.9 % AERS    by Nasal route    SIMETHICONE (GAS RELIEF EXTRA STRENGTH PO)    Take by mouth Gas x oct    VALACYCLOVIR (VALTREX) 500 MG TABLET    Take 500 mg by mouth in the morning, at noon, and at bedtime    ZINC 50 MG TABS TABLET    Take 50 mg by mouth daily       ALLERGIES     Latex, Penicillins, Lipitor [atorvastatin calcium], Nexium [esomeprazole magnesium], Bactrim [sulfamethoxazole-trimethoprim], Ciprofloxacin, Etodolac, Flagyl [metronidazole], Naprosyn [naproxen], and Prednisone    FAMILYHISTORY       Family History   Problem Relation Age of Onset    Heart Disease Mother     Cancer Mother         uterine?     Mental Illness Father     Cancer Father         prostate    Stroke Father     Schizophrenia Father     Cancer Sister         skin    Bipolar Disorder Sister     Depression Son         PTSD    OCD Son         SOCIAL HISTORY       Social History     Tobacco Use    Smoking status: Former Packs/day: 1.50     Years: 35.00     Pack years: 52.50     Types: Cigarettes     Quit date: 2010     Years since quittin.1    Smokeless tobacco: Never   Vaping Use    Vaping Use: Never used   Substance Use Topics    Alcohol use: No     Alcohol/week: 0.0 standard drinks    Drug use: Not Currently     Types: Marijuana Lior Gonzalo)       SCREENINGS   NIH Stroke Scale  Interval: Baseline  Level of Consciousness (1a): Alert  LOC Questions (1b): Answers both correctly  LOC Commands (1c): Performs both tasks correctly  Best Gaze (2): Normal  Visual (3): No visual loss  Facial Palsy (4): Normal symmetrical movement  Motor Arm, Left (5a): No drift  Motor Arm, Right (5b): No drift  Motor Leg, Left (6a): No drift  Motor Leg, Right (6b): No drift  Limb Ataxia (7): Absent  Sensory (8): Normal  Best Language (9): No aphasia  Dysarthria (10): Normal  Extinction and Inattention (11): No abnormality  Total: 0    Faraz Coma Scale  Eye Opening: Spontaneous  Best Verbal Response: Oriented  Best Motor Response: Obeys commands  Faraz Coma Scale Score: 15                CIWA Assessment  BP: 116/72  Heart Rate: 62           PHYSICAL EXAM  1 or more Elements     ED Triage Vitals [23 0858]   BP Temp Temp Source Heart Rate Resp SpO2 Height Weight   119/71 98.2 °F (36.8 °C) Oral 78 18 95 % -- --       Physical Exam  Vitals and nursing note reviewed. Constitutional:       General: She is not in acute distress. Appearance: She is well-developed. She is not diaphoretic. HENT:      Head: Normocephalic and atraumatic. No raccoon eyes, Thao's sign, abrasion or contusion. Right Ear: Hearing and external ear normal.      Left Ear: Hearing and external ear normal.      Nose: Nose normal.      Mouth/Throat:      Pharynx: Uvula midline. Eyes:      General:         Right eye: No discharge. Left eye: No discharge. Pulmonary:      Effort: Pulmonary effort is normal. No respiratory distress. Breath sounds:  No stridor. Musculoskeletal:         General: Normal range of motion. Cervical back: Normal range of motion and neck supple. No rigidity, tenderness or bony tenderness. Normal range of motion. Skin:     General: Skin is warm and dry. Coloration: Skin is not pale. Neurological:      General: No focal deficit present. Mental Status: She is alert and oriented to person, place, and time. GCS: GCS eye subscore is 4. GCS verbal subscore is 5. GCS motor subscore is 6. Cranial Nerves: Cranial nerves 2-12 are intact. No cranial nerve deficit or facial asymmetry. Sensory: Sensation is intact. No sensory deficit. Motor: No weakness, abnormal muscle tone or pronator drift. Coordination: Coordination normal.      Gait: Gait is intact. Comments: Negative romberg. No pronator drift. NIH 0 . No gross facial drooping. Moves all 4 extremities spontaneously. able to walk without assistance in the ED; did have some mild lateral veering when turning a corner. Psychiatric:         Mood and Affect: Mood normal.         Speech: Speech normal. Speech is not slurred. Behavior: Behavior normal.       DIAGNOSTIC RESULTS   LABS:    Labs Reviewed   COMPREHENSIVE METABOLIC PANEL W/ REFLEX TO MG FOR LOW K - Abnormal; Notable for the following components:       Result Value    Glucose 114 (*)     All other components within normal limits   URINALYSIS WITH REFLEX TO CULTURE - Abnormal; Notable for the following components:    Leukocyte Esterase, Urine SMALL (*)     All other components within normal limits   MICROSCOPIC URINALYSIS - Abnormal; Notable for the following components:    Mucus, UA Rare (*)     Epithelial Cells, UA 6-10 (*)     All other components within normal limits   CBC WITH AUTO DIFFERENTIAL   TROPONIN   TSH WITH REFLEX       When ordered only abnormal lab results are displayed. All other labs were within normal range or not returned as of this dictation. EKG:  When ordered, EKG's are interpreted by the Emergency Department Physician in the absence of a cardiologist.  Please see their note for interpretation of EKG. RADIOLOGY:   Non-plain film images such as CT, Ultrasound and MRI are read by the radiologist. Plain radiographic images are visualized and preliminarily interpreted by the ED Provider with the below findings:      Interpretation per the Radiologist below, if available at the time of this note:    CTA HEAD NECK W CONTRAST   Final Result   No flow limiting stenosis or occlusion within the head or neck. CT HEAD WO CONTRAST   Final Result   No acute intracranial abnormality. XR CHEST (2 VW)   Final Result   No significant findings in the chest.           XR CHEST (2 VW)    Result Date: 2/9/2023  EXAMINATION: TWO XRAY VIEWS OF THE CHEST 2/9/2023 9:18 am COMPARISON: 05/28/2020 radiograph HISTORY: ORDERING SYSTEM PROVIDED HISTORY: dizziness TECHNOLOGIST PROVIDED HISTORY: Reason for exam:->dizziness FINDINGS: The heart, mediastinum and pulmonary vascularity are normal.  Lungs are well-expanded and clear. No skeletal abnormalities are present in the chest.     No significant findings in the chest.     CT HEAD WO CONTRAST    Result Date: 2/9/2023  EXAMINATION: CT OF THE HEAD WITHOUT CONTRAST  2/9/2023 10:34 am TECHNIQUE: CT of the head was performed without the administration of intravenous contrast. Automated exposure control, iterative reconstruction, and/or weight based adjustment of the mA/kV was utilized to reduce the radiation dose to as low as reasonably achievable. COMPARISON: 05/28/2020 HISTORY: ORDERING SYSTEM PROVIDED HISTORY: dizziness x 2 weeks TECHNOLOGIST PROVIDED HISTORY: Reason for exam:->dizziness x 2 weeks Has a \"code stroke\" or \"stroke alert\" been called? ->No Decision Support Exception - unselect if not a suspected or confirmed emergency medical condition->Emergency Medical Condition (MA) Reason for Exam: pt having trouble with balance X two weeks, no other symptoms Additional signs and symptoms: no prev stroke or seizures FINDINGS: BRAIN/VENTRICLES: There is no acute intracranial hemorrhage, mass effect or midline shift. No abnormal extra-axial fluid collection. The gray-white differentiation is maintained without evidence of an acute infarct. There is prominence of the ventricles and sulci due to global parenchymal volume loss. There are nonspecific areas of hypoattenuation within the periventricular and subcortical white matter, which likely represent chronic microvascular ischemic change. ORBITS: The visualized portion of the orbits demonstrate no acute abnormality. SINUSES: The visualized paranasal sinuses and mastoid air cells demonstrate no acute abnormality. SOFT TISSUES/SKULL: No acute abnormality of the visualized skull or soft tissues. No acute intracranial abnormality. CTA HEAD NECK W CONTRAST    Result Date: 2/9/2023  EXAMINATION: CTA OF THE HEAD AND NECK WITH CONTRAST 2/9/2023 10:37 am: TECHNIQUE: CTA of the head and neck was performed with the administration of intravenous contrast. Multiplanar reformatted images are provided for review. MIP images are provided for review. Stenosis of the internal carotid arteries measured using NASCET criteria. Automated exposure control, iterative reconstruction, and/or weight based adjustment of the mA/kV was utilized to reduce the radiation dose to as low as reasonably achievable. 3D reconstructed images were performed on a separate workstation and provided for review. COMPARISON: Concurrent head CT HISTORY: ORDERING SYSTEM PROVIDED HISTORY: dizziness x 2 weeks FINDINGS: CTA NECK: AORTIC ARCH/ARCH VESSELS: No dissection or arterial injury. No significant stenosis of the brachiocephalic or subclavian arteries. CAROTID ARTERIES: No dissection, arterial injury, or hemodynamically significant stenosis by NASCET criteria.  VERTEBRAL ARTERIES: No dissection, arterial injury, or significant stenosis. SOFT TISSUES: The lung apices are clear. No cervical or superior mediastinal lymphadenopathy. The larynx and pharynx are unremarkable. No acute abnormality of the salivary and thyroid glands. BONES: No acute osseous abnormality. CTA HEAD: ANTERIOR CIRCULATION: No significant stenosis of the intracranial internal carotid, anterior cerebral, or middle cerebral arteries. No aneurysm. POSTERIOR CIRCULATION: No significant stenosis of the vertebral, basilar, or posterior cerebral arteries. No aneurysm. OTHER: No dural venous sinus thrombosis on this non-dedicated study. BRAIN: No mass effect or midline shift. No extra-axial fluid collection. The gray-white differentiation is maintained. No flow limiting stenosis or occlusion within the head or neck. No results found. PROCEDURES   Unless otherwise noted below, none     Procedures    CRITICAL CARE TIME (.cctime)   Because of high probability of sudden clinical deterioration of the patient's condition or  further deterioration, critical care time included my full attention to the patient's condition, including chart data review, documentation, medication ordering, reviewing the patient's old records, reevaluation patient's cardiac, pulmonary and neurological status. Reassessment of vital signs. Consultations with ED attending and admitting physician. Ordering, interpreting reviewing diagnostic testing. Therefore, my critical care time was 40 minutes of direct attention to the patient's condition did not include time spent on procedures.       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 EMERGENCY DEPARTMENT COURSE and DIFFERENTIAL DIAGNOSIS/MDM:   Vitals:    Vitals:    02/09/23 0922 02/09/23 0958 02/09/23 1104 02/09/23 1157   BP: 122/69 (!) 121/59 115/73 116/72   Pulse:  60 78 62   Resp:  15 17 13   Temp:       TempSrc:       SpO2: 99% 99% 98% 98%       Patient was given the following medications:  Medications   0.9 % sodium chloride bolus (1,000 mLs IntraVENous New Bag 2/9/23 0938)   iopamidol (ISOVUE-370) 76 % injection 75 mL (75 mLs IntraVENous Given 2/9/23 1053)             Is this patient to be included in the SEP-1 Core Measure due to severe sepsis or septic shock? 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 CONSULTS: (Who and What was discussed)  None  Hospitalist consulted for admission for dizziness and concern for ataxic gait x 2weeks    DDX: Ménière's disease, BPPV, stroke or TIA in the posterior circulation, bleed the cerebellopontine angle,  Cardiac arrhythmia, anemia, dehydration, sepsis, Metabolic emergency, Multiple Sclerosis, brain or ENT tumor. Patient is afebrile, in no acute distress, and nontoxic in appearance with unremarkable vital signs. NIHSS is 0. Test of skew was negative.   . Patient without neurological features of diplopia, dysarthria, dysphagia, dysmetria, weakness/sensory loss as detailed above in physical exam.  Upon observance of gait, patient able to walk unassisted in straight line. Given her symptoms have been present x2 weeks I did not call code stroke although did order full work up including CT head/CTA head neck. Orthostatics were positive. Given IVF. Orthostatics still positive after IVF bolus although patient did not complain of dizziness. States she feels off balance only with walking. When ambulating to bathroom I did observe some mild lateral veering when turning. I have reviewed the patient's laboratory results. The patient has normal WBCs, hematocrit and platelets. They have no severe electrolyte abnormality or renal impairment. CT head/CTA head neck negative for acute abnormalities. Discussed admission for further evaluation of dizziness to rule out stroke with MRI, echo, etc. Patient hesitant but agreeable with plan for admission.     Results for orders placed or performed during the hospital encounter of 02/09/23   CBC with Auto Differential   Result Value Ref Range    WBC 5.6 4.0 - 11.0 K/uL    RBC 4.55 4.00 - 5.20 M/uL    Hemoglobin 13.2 12.0 - 16.0 g/dL    Hematocrit 38.8 36.0 - 48.0 %    MCV 85.3 80.0 - 100.0 fL    MCH 29.1 26.0 - 34.0 pg    MCHC 34.1 31.0 - 36.0 g/dL    RDW 14.6 12.4 - 15.4 %    Platelets 368 110 - 808 K/uL    MPV 7.7 5.0 - 10.5 fL    Neutrophils % 46.3 %    Lymphocytes % 42.4 %    Monocytes % 9.1 %    Eosinophils % 1.5 %    Basophils % 0.7 %    Neutrophils Absolute 2.6 1.7 - 7.7 K/uL    Lymphocytes Absolute 2.4 1.0 - 5.1 K/uL    Monocytes Absolute 0.5 0.0 - 1.3 K/uL    Eosinophils Absolute 0.1 0.0 - 0.6 K/uL    Basophils Absolute 0.0 0.0 - 0.2 K/uL   Comprehensive Metabolic Panel w/ Reflex to MG   Result Value Ref Range    Sodium 138 136 - 145 mmol/L    Potassium reflex Magnesium 3.7 3.5 - 5.1 mmol/L    Chloride 102 99 - 110 mmol/L    CO2 27 21 - 32 mmol/L    Anion Gap 9 3 - 16    Glucose 114 (H) 70 - 99 mg/dL    BUN 17 7 - 20 mg/dL    Creatinine 0.9 0.6 - 1.2 mg/dL    Est Glom Filt Rate >60 >60    Calcium 10.1 8.3 - 10.6 mg/dL    Total Protein 7.2 6.4 - 8.2 g/dL    Albumin 4.6 3.4 - 5.0 g/dL    Albumin/Globulin Ratio 1.8 1.1 - 2.2    Total Bilirubin 0.3 0.0 - 1.0 mg/dL    Alkaline Phosphatase 58 40 - 129 U/L    ALT 35 10 - 40 U/L    AST 26 15 - 37 U/L   Troponin   Result Value Ref Range    Troponin <0.01 <0.01 ng/mL   Urinalysis with Reflex to Culture    Specimen: Urine, clean catch   Result Value Ref Range    Color, UA Yellow Straw/Yellow    Clarity, UA Clear Clear    Glucose, Ur Negative Negative mg/dL    Bilirubin Urine Negative Negative    Ketones, Urine Negative Negative mg/dL    Specific Gravity, UA 1.010 1.005 - 1.030    Blood, Urine Negative Negative    pH, UA 6.0 5.0 - 8.0    Protein, UA Negative Negative mg/dL    Urobilinogen, Urine 0.2 <2.0 E.U./dL    Nitrite, Urine Negative Negative    Leukocyte Esterase, Urine SMALL (A) Negative    Microscopic Examination YES     Urine Type NotGiven     Urine Reflex to Culture Not Indicated    Microscopic Urinalysis   Result Value Ref Range    Mucus, UA Rare (A) None Seen /LPF    WBC, UA 3-5 0 - 5 /HPF    RBC, UA 0-2 0 - 4 /HPF    Epithelial Cells, UA 6-10 (A) 0 - 5 /HPF   EKG 12 Lead   Result Value Ref Range    Ventricular Rate 62 BPM    Atrial Rate 62 BPM    P-R Interval 230 ms    QRS Duration 82 ms    Q-T Interval 374 ms    QTc Calculation (Bazett) 379 ms    P Axis 73 degrees    R Axis 35 degrees    T Axis 48 degrees    Diagnosis       Sinus rhythm with sinus arrhythmia with 1st degree A-V blockOtherwise normal ECGWhen compared with ECG of 29-NOV-2021 17:38,No significant change was found       I spoke with Dr. Radha Ames. We thoroughly discussed the history, physical exam, laboratory and imaging studies, as well as, emergency department course.  Based upon that discussion, we've decided to admit Yamilka Funkiram for further observation and evaluation of Estefani Anchors CVA-like symptoms. As I have deemed necessary from their history, physical and studies, I have considered and evaluated Virgilio Buerger for the following diagnoses:  DIABETES, INTRACRANIAL HEMORRHAGE, MENINGITIS, SUBARACHNOID HEMORRHAGE, SUBDURAL HEMATOMA, & STROKE. I am the Primary Clinician of Record. FINAL IMPRESSION      1. Dizziness          DISPOSITION/PLAN     DISPOSITION        PATIENT REFERRED TO:  No follow-up provider specified.     DISCHARGE MEDICATIONS:  New Prescriptions    No medications on file       DISCONTINUED MEDICATIONS:  Discontinued Medications    No medications on file              (Please note that portions of this note were completed with a voice recognition program.  Efforts were made to edit the dictations but occasionally words are mis-transcribed.)    OSKAR Noriega (electronically signed)       OSKAR Noriega  02/09/23 4931

## 2023-02-09 NOTE — PROGRESS NOTES
Herbal and Nutritional Product Restrictions      The following herbal, alternative, and/or nutritional/dietary supplement product(s) has been discontinued per [facility] approved policy:    Fish oil 5511 mg daily    Please reorder upon discharge if appropriate.     Thank you,  Gume Garcia, [facility]  2/9/2023 4:33 PM

## 2023-02-09 NOTE — H&P
Hospital Medicine History & Physical      PCP: Adrian Green MD    Date of Admission: 2/9/2023    Date of Service: Pt seen/examined on 2/9/23 and placed in observation. Chief Complaint:  dizziness      History Of Present Illness: 72 y.o. female with past medical history significant for COPD, hypothyroidism, obesity, GERD, osteoarthritis, hyperlipidemia, hypertension, chronic pain. She presented to [facility] with complaint of dizziness for the past 2-3 days. It is worse with standing and ambulation. Denies headache, falls, LOC, vision changes. She reports previous episodes of dizziness that were brief, and not like what she is currently experiencing. She has been on a keto diet for the past several months, but is unsure how much weight she has lost. She denies chest pain, dyspnea, n/v/d/c, dysuria, fever, chills, headache. Standing aggravates her dizziness, rest alleviates. There is no radiation. ED course: orthostatic VS positive, so fluid was given.  Initial labs obtained,     Based on the above, patient warrants admission to the hospital    Past Medical History:          Diagnosis Date    Allergic rhinitis     Anxiety     Asthma     Bipolar 1 disorder ([facility] [address]     questionable dx per patient    Chronic low back pain     COPD (chronic obstructive pulmonary disease) (HCC)     Decreased bone density 9-9-2008    First degree hemorrhoids 9/1/2016    Former smoker 6/13/2013    GERD (gastroesophageal reflux disease)     H/O vulvar dysplasia 1/9/2015    VERONICA 3 wide local excision     Hyperlipidemia     Hypothyroid     Hypothyroidism     Insomnia     Obesity (BMI 30-39.9) 3/16/2021    Osteoarthritis     Prolonged emergence from general anesthesia     Urinary incontinence        Past Surgical History:          Procedure Laterality Date    COLONOSCOPY  18 Apr 2016    Diverticulosis, hemorrhoid    CYSTOSCOPY N/A 12/13/2021    CYSTOSCOPY,  URETHRAL DILATATION performed by Reina Alcala MD at [facility] HYSTERECTOMY (CERVIX STATUS UNKNOWN)  2001    Partial ? Endometriosis - Benign    OTHER SURGICAL HISTORY  12/13/2021      cystoscopy, urethral dilation    OVARY REMOVAL      TUBAL LIGATION  1981       Medications Prior to Admission:      Prior to Admission medications    Medication Sig Start Date End Date Taking?  Authorizing Provider   pantoprazole (PROTONIX) 40 MG tablet TAKE 1 TABLET BY MOUTH EVERY MORNING BEFORE BREAKFAST 1/4/23   Carolyn Celaya MD   methocarbamol (ROBAXIN) 500 MG tablet TAKE 1 TABLET BY MOUTH THREE TIMES DAILY 12/6/22   Carolyn Celaya MD   levothyroxine (SYNTHROID) 100 MCG tablet TAKE 1 TABLET DAILY 11/14/22   Carolyn Celaya MD   ibuprofen (ADVIL;MOTRIN) 400 MG tablet TAKE 1 TO 2 TABLETS 3 TIMESA DAY AS NEEDED WITH FOOD 11/14/22   Carolyn Celaya MD   MULTIPLE VITAMIN PO     Historical Provider, MD   Omega-3 Fatty Acids (FISH OIL) 1200 MG CAPS Take by mouth    Historical Provider, MD   Simethicone (GAS RELIEF EXTRA STRENGTH PO) Take by mouth Gas x oct    Historical Provider, MD   GLUCOSAMINE-CHONDROIT-CALCIUM PO Take 1,200 mg by mouth    Historical Provider, MD   zinc 50 MG TABS tablet Take 50 mg by mouth daily    Historical Provider, MD   Saline (SIMPLY SALINE) 0.9 % AERS by Nasal route    Historical Provider, MD   Isopropyl Alcohol (AURO DRI SWIMMERS EARS OT) Place in ear(s)    Historical Provider, MD   oxybutynin (DITROPAN) 5 MG tablet TAKE 1 TABLET TWICE A DAY 10/7/22   Carolyn Celaya MD   montelukast (SINGULAIR) 10 MG tablet TAKE 1 TABLET NIGHTLY 10/7/22   Carolyn Celaya MD   omeprazole (PRILOSEC) 40 MG delayed release capsule Take 1 capsule by mouth daily 10/7/22   Carolyn Celaya MD   cetirizine (ZYRTEC) 10 MG tablet TAKE 1 TABLET BY MOUTH EVERY NIGHT 10/7/22   Carolyn Celaya MD   lactase (LACTAID) 3000 units tablet Take 1 tablet by mouth 3 times daily (with meals) 10/7/22   Carolyn Celaya MD   fluticasone (FLONASE) 50 MCG/ACT nasal spray SHAKE LIQUID AND USE 2 SPRAYS IN [facility] NOSTRIL DAILY 10/7/22   Carmen Jimenez MD   albuterol sulfate HFA (PROVENTIL;VENTOLIN;PROAIR) 108 (90 Base) MCG/ACT inhaler USE 2 INHALATIONS ORALLY EVERY 6 HOURS AS NEEDED FOR SHORTNESS OF BREATH OR WHEEZING 10/7/22   Carmen Jimenez MD   Evolocumab (REPATHA) 140 MG/ML SOSY Inject 1 mL into the skin every 14 days 4/29/22   Sol Covarrubias., DO   valACYclovir (VALTREX) 500 MG tablet Take 500 mg by mouth in the morning, at noon, and at bedtime 4/19/22   Historical Provider, MD   Lift Chair [address] [facility] by Does not apply route 4/8/22   Sol Covarrubias.,    baclofen (LIORESAL) 10 MG tablet Take 1 tablet by mouth 3 times daily 2/2/22   Sol Covarrubias., DO   fluticasone [facility]) 50 MCG/ACT nasal spray 2 sprays by Each Nostril route daily 4/21/21   Nancy Cha MD   Handicap Placard MISC by Does not apply route Good for 5  years 10/15/20 7/30/21  Sol Covarrubias., DO   Coenzyme Q10 200 MG CAPS Take 1 tablet by mouth daily 10/23/19   Thomasenia Kayser Tegtmeier, MD   Estradiol (VAGIFEM) 10 MCG TABS vaginal tablet  9/4/19   Historical Provider, MD   folic acid (FOLVITE) 897 MCG tablet Take 400 mcg by mouth daily    Historical Provider, MD   Ascorbic Acid (VITAMIN C) 1000 MG tablet Take 1,000 mg by mouth daily    Historical Provider, MD   Calcium Carbonate Antacid 1000 MG CHEW Take by mouth    Historical Provider, MD   [employer] VITAMIN D3 1000 units CAPS TAKE 1 SOFTGEL BY MOUTH DAILY 9/5/17   RUSLAN Aguero CNP   imiquimod (ALDARA) 5 % cream Apply topically three times a week Indications: [facility] ordering Apply topically three times a week. 8/9/17   RUSLAN Hill CNP   psyllium (KONSYL) 28.3 % POWD powder Take 1 each by mouth daily    Historical Provider, MD   hydrocortisone (ANUSOL-HC) 2.5 % rectal cream Place rectally 2 times daily.  9/1/16   RUSLAN Hill - CNP   acetaminophen (TYLENOL) 325 MG tablet Take 650 mg by mouth every 6 hours as needed for Pain    Historical Provider, MD   tolterodine (DETROL LA) 4 MG ER capsule Take 1 capsule by mouth daily. 8/13/13 1/29/18  Nicolette Miles MD       Allergies:  Latex, Penicillins, Lipitor [atorvastatin calcium], Nexium [esomeprazole magnesium], Bactrim [sulfamethoxazole-trimethoprim], Ciprofloxacin, Etodolac, Flagyl [metronidazole], Naprosyn [naproxen], and Prednisone    Social History:      The patient currently lives alone    TOBACCO:   reports that she quit smoking about 13 years ago. Her smoking use included cigarettes. She has a 52.50 pack-year smoking history. She has never used smokeless tobacco.  ETOH:   reports no history of alcohol use. E-cigarette/Vaping       Questions Responses    E-cigarette/Vaping Use Never User    Start Date     Passive Exposure     Quit Date     Counseling Given     Comments               Family History:      Reviewed and negative in regards to presenting illness/complaint. Problem Relation Age of Onset    Heart Disease Mother     Cancer Mother         uterine? Mental Illness Father     Cancer Father         prostate    Stroke Father     Schizophrenia Father     Cancer Sister         skin    Bipolar Disorder Sister     Depression Son         PTSD    OCD Son        REVIEW OF SYSTEMS COMPLETED:   Pertinent positives as noted in the HPI. All other systems reviewed and negative. PHYSICAL EXAM PERFORMED:    /63   Pulse 67   Temp 98.2 °F (36.8 °C) (Oral)   Resp 12   LMP  (LMP Unknown)   SpO2 97%     General appearance: Resting in bed, comfortable. No apparent distress, appears stated age and cooperative. HEENT: Normal cephalic, atraumatic without obvious deformity. Pupils equal, round, and reactive to light. Extra ocular muscles intact. Conjunctivae/corneas clear. Glasses. Neck: Supple, with full range of motion. No jugular venous distention. Trachea midline. Respiratory: Normal respiratory effort.  Clear to auscultation, bilaterally without Rales/Wheezes/Rhonchi. Room air  Cardiovascular: Regular rate and rhythm with normal S1/S2 without murmurs, rubs or gallops. Abdomen: Soft, non-tender, non-distended with normal bowel sounds. Musculoskeletal: No clubbing, cyanosis or edema bilaterally. Full range of motion without deformity. Skin: Skin color, texture, turgor normal. No rashes or lesions. Neurologic: Neurovascularly intact without any focal sensory/motor deficits. Cranial nerves: II-XII intact, grossly non-focal.  Psychiatric: Alert and oriented, thought content appropriate, normal insight  Capillary Refill: Brisk,3 seconds, normal  Peripheral Pulses: +2 palpable, equal bilaterally       Labs:     Recent Labs     02/09/23 0934   WBC 5.6   HGB 13.2   HCT 38.8        Recent Labs     02/09/23 0934      K 3.7      CO2 27   BUN 17   CREATININE 0.9   CALCIUM 10.1     Recent Labs     02/09/23 0934   AST 26   ALT 35   BILITOT 0.3   ALKPHOS 58     No results for input(s): INR in the last 72 hours. Recent Labs     02/09/23 0934   TROPONINI <0.01       Urinalysis:      Lab Results   Component Value Date/Time    NITRU Negative 02/09/2023 10:20 AM    WBCUA 3-5 02/09/2023 10:20 AM    BACTERIA 2+ 11/01/2021 12:09 PM    RBCUA 0-2 02/09/2023 10:20 AM    BLOODU Negative 02/09/2023 10:20 AM    SPECGRAV 1.010 02/09/2023 10:20 AM    GLUCOSEU Negative 02/09/2023 10:20 AM       Radiology:     CXR: I have reviewed the CXR with the following interpretation: clear lung fields  EKG:  I have reviewed the EKG with the following interpretation: sinus rhythm    CTA HEAD NECK W CONTRAST   Final Result   No flow limiting stenosis or occlusion within the head or neck. CT HEAD WO CONTRAST   Final Result   No acute intracranial abnormality.          XR CHEST (2 VW)   Final Result   No significant findings in the chest.         MRI BRAIN WO CONTRAST    (Results Pending)       Consults:    None    ASSESSMENT & PLAN:    Active Hospital Problems    Diagnosis Date Noted    Stroke-like symptoms [R29.90] 02/09/2023     Priority: Medium     Dizziness  -Orthostatic VS were positive in the ED, she received fluid. Will recheck these tomorrow  -CT head, no intracranial abnormalities  -CTA head/neck, no LVO  -Will obtain MRI brain to complete stroke workup  -A1C pending    Hyperlipidemia, uncontrolled  -LDL noted to be 118 in April 2022, repeat ordered  -On review, pt noted to be on repatha    Hypothyroidism   -Clinically euthyroid  -TSH 4/2022 noted to be 1.66, with a free T4 of 1.7  -TSH w/ reflex ordered 2/9  -Has outpatient endo to follow with, was actually supposed to see them today  -Continue home dose levothyroxine    COPD, stable   -Does not require oxygen at home  -Continue home regimen  -Monitor    Obesity  -BMI 35.57  -This complicates assessment and treatment. Placing patient at risk for multiple co-morbidities as well as early death and contributing to the patient's presentation  -Counseled on weight loss      DVT Prophylaxis: lovenox  Diet: No diet orders on file  Code Status: Prior    PT/OT Eval Status: consulted    Dispo - likely 2/10, pending course       Svetlana Larson, RUSLAN - CNP    Thank you AZAEL VASQUEZ MD for the opportunity to be involved in this patient's care. If you have any questions or concerns please feel free to contact me at [phone].

## 2023-02-09 NOTE — ED PROVIDER NOTES
I independently examined and evaluated Fidel Arnold. All diagnostic, treatment, and disposition decisions were made by myself in conjunction with the [facility]. For all further details of the patient's emergency department visit, please see their documentation. 81-year-old female presents with complaint of dizziness. She states that she has had intermittent dizziness over the past 2 weeks but its been persistent since yesterday morning. She states she feels off balance when she is walking like she needs to hold onto something. She denies near syncope but has had a little lightheadedness as well. No chest pain or shortness of breath. No fever. No vomiting or diarrhea. No weakness or numbness of her extremities. She states she has been following a keto diet over the past several months and was recently diagnosed with hypothyroidism. No vision changes or vision loss. Vitals:    02/09/23 1332   BP: 117/63   Pulse: 67   Resp:    Temp:    SpO2: 97%   Here the patient is awake and alert. Heart is regular rate and rhythm, no respiratory distress. NIH stroke scale is 0.     Results for orders placed or performed during the hospital encounter of 02/09/23   CBC with Auto Differential   Result Value Ref Range    WBC 5.6 4.0 - 11.0 K/uL    RBC 4.55 4.00 - 5.20 M/uL    Hemoglobin 13.2 12.0 - 16.0 g/dL    Hematocrit 38.8 36.0 - 48.0 %    MCV 85.3 80.0 - 100.0 fL    MCH 29.1 26.0 - 34.0 pg    MCHC 34.1 31.0 - 36.0 g/dL    RDW 14.6 12.4 - 15.4 %    Platelets 624 188 - 178 K/uL    MPV 7.7 5.0 - 10.5 fL    Neutrophils % 46.3 %    Lymphocytes % 42.4 %    Monocytes % 9.1 %    Eosinophils % 1.5 %    Basophils % 0.7 %    Neutrophils Absolute 2.6 1.7 - 7.7 K/uL    Lymphocytes Absolute 2.4 1.0 - 5.1 K/uL    Monocytes Absolute 0.5 0.0 - 1.3 K/uL    Eosinophils Absolute 0.1 0.0 - 0.6 K/uL    Basophils Absolute 0.0 0.0 - 0.2 K/uL   Comprehensive Metabolic Panel w/ Reflex to MG   Result Value Ref Range    Sodium 138 136 - 145 mmol/L    Potassium reflex Magnesium 3.7 3.5 - 5.1 mmol/L    Chloride 102 99 - 110 mmol/L    CO2 27 21 - 32 mmol/L    Anion Gap 9 3 - 16    Glucose 114 (H) 70 - 99 mg/dL    BUN 17 7 - 20 mg/dL    Creatinine 0.9 0.6 - 1.2 mg/dL    Est, Glom Filt Rate >60 >60    Calcium 10.1 8.3 - 10.6 mg/dL    Total Protein 7.2 6.4 - 8.2 g/dL    Albumin 4.6 3.4 - 5.0 g/dL    Albumin/Globulin Ratio 1.8 1.1 - 2.2    Total Bilirubin 0.3 0.0 - 1.0 mg/dL    Alkaline Phosphatase 58 40 - 129 U/L    ALT 35 10 - 40 U/L    AST 26 15 - 37 U/L   Troponin   Result Value Ref Range    Troponin <0.01 <0.01 ng/mL   Urinalysis with Reflex to Culture    Specimen: Urine, clean catch   Result Value Ref Range    Color, UA Yellow Straw/Yellow    Clarity, UA Clear Clear    Glucose, Ur Negative Negative mg/dL    Bilirubin Urine Negative Negative    Ketones, Urine Negative Negative mg/dL    Specific Gravity, UA 1.010 1.005 - 1.030    Blood, Urine Negative Negative    pH, UA 6.0 5.0 - 8.0    Protein, UA Negative Negative mg/dL    Urobilinogen, Urine 0.2 <2.0 E.U./dL    Nitrite, Urine Negative Negative    Leukocyte Esterase, Urine SMALL (A) Negative    Microscopic Examination YES     Urine Type NotGiven     Urine Reflex to Culture Not Indicated    Microscopic Urinalysis   Result Value Ref Range    Mucus, UA Rare (A) None Seen /LPF    WBC, UA 3-5 0 - 5 /HPF    RBC, UA 0-2 0 - 4 /HPF    Epithelial Cells, UA 6-10 (A) 0 - 5 /HPF   EKG 12 Lead   Result Value Ref Range    Ventricular Rate 62 BPM    Atrial Rate 62 BPM    P-R Interval 230 ms    QRS Duration 82 ms    Q-T Interval 374 ms    QTc Calculation (Bazett) 379 ms    P Axis 73 degrees    R Axis 35 degrees    T Axis 48 degrees    Diagnosis       Sinus rhythm with sinus arrhythmia with 1st degree A-V blockOtherwise normal ECGWhen compared with ECG of 29-NOV-2021 17:38,No significant change was foundConfirmed by CASSI CLINE MD ([id number]) on 2/9/2023 2:09:00 PM       CTA HEAD NECK W CONTRAST   Final Result   No flow limiting stenosis or occlusion within the head or neck.         CT HEAD WO CONTRAST   Final Result   No acute intracranial abnormality.         XR CHEST (2 VW)   Final Result   No significant findings in the chest.         MRI BRAIN WO CONTRAST    (Results Pending)           EKG  The Ekg interpreted by myself  normal sinus rhythm with a rate of 62 with first-degree AV block  Axis is   Normal  QTc is  normal  Intervals and Durations are unremarkable.      No specific ST-T wave changes appreciated.  No evidence of acute ischemia.   No significant change from prior EKG dated May 29, 2020        I personally saw and performed a substantive portion of the visit including all aspects of the medical decision making.  MDM:      The patient presents with symptoms that have been ongoing for greater than 24 hours therefore a stroke alert was not activated.  She does have an NIH stroke scale of 0 but states she feels unsteady when standing and walking.  CT head and CTA head and neck are unremarkable, lab work all appears to be at baseline.  Given her persistent symptoms we will admit her to the hospitalist for further evaluation and stroke work-up including brain MRI.  Patient is in agreement.     I personally saw the patient and independently provided 0 minutes of nonconcurrent critical care out of the total shared critical care time provided.        Zeny Lopez MD  02/09/23 [id number]

## 2023-02-10 ENCOUNTER — APPOINTMENT (OUTPATIENT)
Dept: MRI IMAGING | Age: 73
End: 2023-02-10
Payer: MEDICARE

## 2023-02-10 VITALS
HEART RATE: 52 BPM | RESPIRATION RATE: 18 BRPM | WEIGHT: 182.2 LBS | OXYGEN SATURATION: 97 % | TEMPERATURE: 97.9 F | SYSTOLIC BLOOD PRESSURE: 127 MMHG | HEIGHT: 64 IN | BODY MASS INDEX: 31.1 KG/M2 | DIASTOLIC BLOOD PRESSURE: 71 MMHG

## 2023-02-10 LAB
CHOLESTEROL, TOTAL: 141 MG/DL (ref 0–199)
ESTIMATED AVERAGE GLUCOSE: 105.4 MG/DL
ESTIMATED AVERAGE GLUCOSE: 105.4 MG/DL
HBA1C MFR BLD: 5.3 %
HBA1C MFR BLD: 5.3 %
HCT VFR BLD CALC: 35.5 % (ref 36–48)
HDLC SERPL-MCNC: 49 MG/DL (ref 40–60)
HEMOGLOBIN: 12.1 G/DL (ref 12–16)
LDL CHOLESTEROL CALCULATED: 78 MG/DL
MCH RBC QN AUTO: 29.1 PG (ref 26–34)
MCHC RBC AUTO-ENTMCNC: 34.1 G/DL (ref 31–36)
MCV RBC AUTO: 85.4 FL (ref 80–100)
PDW BLD-RTO: 14.5 % (ref 12.4–15.4)
PLATELET # BLD: 212 K/UL (ref 135–450)
PMV BLD AUTO: 7.6 FL (ref 5–10.5)
RBC # BLD: 4.16 M/UL (ref 4–5.2)
TRIGL SERPL-MCNC: 72 MG/DL (ref 0–150)
VLDLC SERPL CALC-MCNC: 14 MG/DL
WBC # BLD: 5.5 K/UL (ref 4–11)

## 2023-02-10 PROCEDURE — 6370000000 HC RX 637 (ALT 250 FOR IP)

## 2023-02-10 PROCEDURE — G0378 HOSPITAL OBSERVATION PER HR: HCPCS

## 2023-02-10 PROCEDURE — 36415 COLL VENOUS BLD VENIPUNCTURE: CPT

## 2023-02-10 PROCEDURE — 80061 LIPID PANEL: CPT

## 2023-02-10 PROCEDURE — 92610 EVALUATE SWALLOWING FUNCTION: CPT

## 2023-02-10 PROCEDURE — 70551 MRI BRAIN STEM W/O DYE: CPT

## 2023-02-10 PROCEDURE — 85027 COMPLETE CBC AUTOMATED: CPT

## 2023-02-10 PROCEDURE — 83036 HEMOGLOBIN GLYCOSYLATED A1C: CPT

## 2023-02-10 RX ORDER — MECLIZINE HCL 12.5 MG/1
12.5 TABLET ORAL 3 TIMES DAILY PRN
Status: DISCONTINUED | OUTPATIENT
Start: 2023-02-10 | End: 2023-02-10 | Stop reason: HOSPADM

## 2023-02-10 RX ADMIN — METHOCARBAMOL 500 MG: 500 TABLET ORAL at 08:03

## 2023-02-10 RX ADMIN — OXYCODONE HYDROCHLORIDE AND ACETAMINOPHEN 1000 MG: 500 TABLET ORAL at 08:02

## 2023-02-10 RX ADMIN — FOLIC ACID 500 MCG: 1 TABLET ORAL at 08:01

## 2023-02-10 RX ADMIN — ASPIRIN 81 MG: 81 TABLET, COATED ORAL at 08:02

## 2023-02-10 RX ADMIN — LEVOTHYROXINE SODIUM 100 MCG: 0.1 TABLET ORAL at 06:11

## 2023-02-10 RX ADMIN — OXYBUTYNIN CHLORIDE 5 MG: 5 TABLET ORAL at 08:02

## 2023-02-10 ASSESSMENT — PAIN SCALES - GENERAL
PAINLEVEL_OUTOF10: 0
PAINLEVEL_OUTOF10: 0

## 2023-02-10 NOTE — DISCHARGE INSTR - DIET
Good nutrition is important when healing from an illness, injury, or surgery. Follow any nutrition recommendations given to you during your hospital stay. If you were given an oral nutrition supplement while in the hospital, continue to take this supplement at home. You can take it with meals, in-between meals, and/or before bedtime. These supplements can be purchased at most local grocery stores, pharmacies, and chain super-stores. If you have any questions about your diet or nutrition, call the hospital and ask for the dietitian. Heart Healthy Nutrition    A heart-healthy diet is recommended to reduce your unhealthy blood cholesterol levels, manage high blood pressure, and lower your risk for heart disease. To follow a heart-healthy diet,  Eat a balanced diet with whole grains, fruits and vegetables, and lean protein sources. Achieve and maintain a healthy weight. Choose heart-healthy unsaturated fats. Limit saturated fats, trans fats, and cholesterol intake. Eat more plant-based or vegetarian meals using beans and soy foods for protein. Eat whole, unprocessed foods to limit the amount of sodium (salt) you eat. Limit refined carbohydrates especially sugar, sweets and sugar-sweetened beverages. If you drink alcohol, do so in moderation: one serving per day (women) and two servings per day (men). One serving is equivalent to 12 ounces beer, 5 ounces wine, or 1.5 ounces distilled spirits    Tips  Tips for Choosing Heart-Healthy Fats  Choose lean protein and low-fat dairy foods to reduce saturated fat intake. Saturated fat is usually found in animal-based protein and is associated with certain health risks. Saturated fat is the biggest contributor to raised low-density lipoprotein (LDL) cholesterol levels in the diet. Research shows that limiting saturated fat lowers unhealthy cholesterol levels. Eat no more than 5-6% of your total calories each day from saturated fat.  Ask your registered dietitian nutritionist (RDN) to help you determine how much saturated fat is right for you. There are many foods that do not contain large amounts of saturated fats. Swapping these foods to replace foods high in saturated fats will help you limit the saturated fat you eat and improve your cholesterol levels. You can also try eating more plant-based or vegetarian meals. Instead of Try:   Whole milk, cheese, yogurt, and ice cream 1%, ½%, or skim milk, low-fat cheese, non-fat yogurt, and low-fat ice cream   Fatty, marbled beef and pork Lean beef, pork, or venison   Poultry with skin Poultry without skin   Butter, stick margarine Reduced-fat, whipped, or liquid spreads   Coconut oil, palm oil Liquid vegetable oils: corn, canola, olive, soybean and safflower oils     Avoid trans fats. Trans fats increase levels of LDL-cholesterol. Hydrogenated fat in processed foods is the main source of trans fats in foods. Trans fats can be found in stick margarine, shortening, processed sweets, baked goods, some fried foods, and packaged foods made with hydrogenated oils. Avoid foods with partially hydrogenated oil on the ingredient list such as: cookies, pastries, baked goods, biscuits, crackers, microwave popcorn, and frozen dinners. Choose foods with heart healthy fats. Polyunsaturated and monounsaturated fat are unsaturated fats that may help lower your blood cholesterol level when used in place of saturated fat in your diet. Ask your RDN about taking a dietary supplement with plant sterols and stanols to help lower your cholesterol level. Research shows that substituting saturated fats with unsaturated fats is beneficial to cholesterol levels. Try these easy swaps:      Instead of Try:   Butter, stick margarine, or solid shortening Reduced-fat, whipped, or liquid spreads   Beef, pork, or poultry with skin    Fish and seafood   Chips, crackers, snack foods Raw or unsalted nuts and seeds or nut butters  Hummus with vegetables  Avocado on toast   Coconut oil, palm oil Liquid vegetable oils: corn, canola, olive, soybean and safflower oils      Limit the amount of cholesterol you eat to less than 200 milligrams per day. Cholesterol is a substance carried through the bloodstream via lipoproteins, which are known as transporters of fat. Some body functions need cholesterol to work properly, but too much cholesterol in the bloodstream can damage arteries and build up blood vessel linings (which can lead to heart attack and stroke). You should eat less than 200 milligrams cholesterol per day. People respond differently to eating cholesterol. There is no test available right now that can figure out which people will respond more to dietary cholesterol and which will respond less. For individuals with high intake of dietary cholesterol, different types of increase (none, small, moderate, large) in LDL-cholesterol levels are all possible. Food sources of cholesterol include egg yolks and organ meats such as liver, gizzards. Limit egg yolks to two to four per week and avoid organ meats like liver and gizzards to control cholesterol intake. Tips for Choosing Heart-Healthy Carbohydrates  Consume foods rich in viscous (soluble) fiber  Viscous, or soluble, fiber is found in the walls of plant cells. Viscous fiber is found only in plant-based foods--animal-based foods like meat or dairy products do not contain fiber. In the stomach, viscous fibers absorb water and swell to form a thick, jelly-like mass. This helps to lower your unhealthy cholesterol  Rich sources of viscous fiber include asparagus, [place] sprouts, sweet potatoes, turnips, apricots, mangoes, oranges, legumes, barley, oats, and oat bran. Eat at least 5 to 10 grams of viscous fiber each day. As you increase your fiber intake gradually, also increase the amount of water you drink. This will help prevent constipation.   If you have difficulty achieving this goal, ask your RDN about fiber laxatives. Choose fiber supplements made with viscous fibers such as psyllium seed husks or methylcellulose to help lower unhealthy cholesterol. Limit refined carbohydrates  There are three types of carbohydrates: starches, sugar, and fiber. Some carbohydrates occur naturally in food, like the starches in rice or corn or the sugars in fruits and milk. Refined carbohydrates--foods with high amounts of simple sugars--can raise triglyceride levels. High triglyceride levels are associated with coronary heart disease. Some examples of refined carbohydrate foods are table sugar, sweets, and beverages sweetened with added sugar. Tips for Reducing Sodium (Salt)  Although sodium is important for your body to function, too much sodium can be harmful for people with high blood pressure. As sodium and fluid buildup in your tissues and bloodstream, your blood pressure increases. High blood pressure may cause damage to other organs and increase your risk for a stroke. Keep your salt intake to 2300 milligrams or less per day. Even if you take a pill for blood pressure or a water pill (diuretic) to remove fluid, it is still important to have less salt in your diet. Ask your RDN what amount of sodium is right for you. Avoid processed foods. Eat more fresh foods. Fresh fruits and vegetables are naturally low in sodium, as well as frozen vegetables and fruits that have no added juices or sauces. Fresh meats are lower in sodium than processed meats, such as marquez, sausage, and hotdogs. Read the nutrition label or ask your  to help you find a fresh meat that is low in sodium. Eat less salt--at the table and when cooking. A single teaspoon of table salt has 2,300 mg of sodium. Leave the salt out of recipes for pasta, casseroles, and soups. Ask your RDN how to cook your favorite recipes without sodium  Be a smart . Look for food packages that say salt-free or sodium-free.  These items contain less than 5 milligrams of sodium per serving. Very low-sodium products contain less than 35 milligrams of sodium per serving. Low-sodium products contain less than 140 milligrams of sodium per serving. Beware of reduced salt or \"reduced sodium\" products. These items may still be high in sodium. Check the nutrition label. Add flavors to your food without adding sodium. Try lemon juice, lime juice, fruit juice or vinegar. Dry or fresh herbs add flavor. Try basil, bay leaf, dill, rosemary, parsley, berhane, dry mustard, nutmeg, thyme, and paprika. Pepper, red pepper flakes, and cayenne pepper can add spice to your meals without adding sodium. Hot sauce contains sodium, but if you use just a drop or two, it will not add up to much. Buy a sodium-free seasoning blend or make your own at home. Additional Lifestyle Tips  Achieve and maintain a healthy weight. Talk with your RDN or your doctor about what is a healthy weight for you. Set goals to reach and maintain that weight. To lose weight, reduce your calorie intake along with increasing your physical activity. A weight loss of 10 to 15 pounds could reduce LDL-cholesterol by 5 milligrams per deciliter. Participate in physical activity. Talk with your health care team to find out what types of physical activity are best for you. Set a plan to get about 30 minutes of exercise on most days.       Foods to Choose or to Limit  Food Group Foods to Choose Foods to Limit   Grains Whole grain breads and cereals, including whole wheat, barley, rye, buckwheat, corn, teff, quinoa, millet, amaranth, brown or wild rice, sorghum, and oats  Pasta, especially whole wheat or other whole grain types  [place], quinoa or wild rice  Whole grain crackers, bread, rolls, pitas  Home-made bread with reduced-sodium baking soda Breads or crackers topped with salt  Cereals (hot or cold) with more than 300 mg sodium per serving  Biscuits, cornbread, and other quick breads prepared with baking soda  Bread crumbs or stuffing mix from a store  High-fat bakery products, such as doughnuts, biscuits, croissants, [place] pastries, pies, cookies  Instant cooking foods to which you add hot water and stir--potatoes, noodles, rice, etc.  Packaged starchy foods--seasoned noodle or rice dishes, stuffing mix, macaroni and cheese dinner  Snacks made with partially hydrogenated oils, including chips, cheese puffs, snack mixes, regular crackers, butter-flavored popcorn   Protein Foods Lean cuts of beef and pork (loin, leg, round, extra lean hamburger)  Skinless poultry  Fish  Venison and other wild game  Dried beans and peas  Nuts and nut butters (unsalted)  Seeds and seed butters (unsalted)  Meat alternatives made with soy or textured vegetable protein  Egg whites or egg substitute  Cold cuts made with lean meat or soy protein Higher-fat cuts of meats (ribs, t-bone steak, regular hamburger)  Borja, sausage, or hot dogs  Cold cuts, such as salami or bologna, deli meats, cured meats, corned beef  Organ meats (liver, brains, gizzards, sweetbreads)  Poultry with skin  Fried or smoked meat, poultry, and fish  Whole eggs and egg yolks (more than 2-4 per week)  Salted legumes, nuts, seeds, or nut/seed butters  Meat alternatives with high levels of sodium (>300 mg per serving) or saturated fat (>5 g per serving)   Dairy and Dairy Alternatives Nonfat (skim), low-fat, or 1%-fat milk  Nonfat or low-fat yogurt or cottage cheese  Fat-free and low-fat cheese  Fortified non-dairy milk: almond, cashew, pea, and soy Whole milk, 2% fat milk, buttermilk  Whole milk yogurt or ice cream  Cream, half-&-half  Cream cheese  Sour cream  Cheese   Vegetables Fresh, frozen, or canned vegetables without added fat or salt  Avocados Canned or frozen vegetables with salt, fresh vegetables prepared with salt, butter, cheese, or cream sauce  Fried vegetables  Pickled vegetables such as olives, pickles, or sauerkraut Fruits Fresh, frozen, canned, or dried fruit Fried fruits  Fruits served with butter or cream   Oils Unsaturated oils (corn, olive, peanut, soy, sunflower, canola)  Soft or liquid margarines and vegetable oil spreads  Salad dressings made from saturated fats Butter, stick margarine, shortening  Partially hydrogenated oils or trans fats  Tropical oils (coconut, palm, palm kernel oils)   Other Prepared or homemade foods, including soups, casseroles, and salads made from recommended ingredients and contain <600 mg sodium. Low-sodium seasonings (ketchup, barbeque sauce)  Spices, herbs, Salt-free seasoning mixes and marinades  Vinegar  Lemon or lime juice Prepared foods, including soups, casseroles, and salads made from recommended ingredients and contain >600 mg sodium.   Frozen meals and prepared sides that are >600 mg of sodium  Sugary and/or fatty desserts, candy, and other sweets  Salts:  sea salt, kosher salt, onion salt, and garlic salt, seasoning mixes containing salt  Flavorings: bouillon cubes, catsup or ketchup, barbeque sauce, Worcestershire sauce, soy sauce, salsa, relish, teriyaki sauce     Heart-Healthy Sample 1-Day Menu View Nutrient Info  Breakfast 1 cup oatmeal  1 cup fat-free milk  1 cup blueberries  1 ounce almonds, unsalted  1 cup brewed coffee   Lunch 2 slices whole-wheat bread  2 ounces lean turkey breast  1 ounce low-fat Swiss cheese  2 slices tomato  2 lettuce leaves  1 pear  1 cup skim milk   Afternoon Snack 1 ounce trail mix with unsalted nuts, seeds, and raisins   Evening Meal 3 ounces broiled salmon  2/3 cup brown rice  1 teaspoon margarine, soft tub  ½ cup broccoli, cooked  ½ cup carrots, cooked  1 cup tossed salad  1 teaspoon olive oil and vinegar dressing  1 small whole-wheat roll  1 teaspoon margarine, soft tub  1 cup tea   Evening Snack 1 small banana          The Dietary Guidelines for Americans recommend for adults to consume 1½-2½ cup-equivalents of dark-green vegetables per week, including cruciferous vegetables, as part of healthy meals. Examples: broccoli, [place] sprouts, cabbage, cauliflower, raheel greens, kale, kohlrabi, mustard, rutabaga, turnips, bok [place], radishes, watercress, and [facility] cabbage. Questions?  Call Dietitians office at [phone]

## 2023-02-10 NOTE — PROGRESS NOTES
Patient has home medication in room not in original bottles, medications are in a weekly dispenser. Patient refused to let primary RN place medication in lock box in room. This RN called clinical for guidance, after speaking with clinical this RN explained to patient, staff would not be able to give her any medication due to not knowing what else the patient is taking. The patient continued to refuse to have medication placed in lock box. This RN informed patient it was her right to refuse to have medication locked in the lock box but per policy staff would not be able to give any medication. Patient became disrespectful and rude towards this RN. RN turned to leave room. Patient asked if she would receive any medications RN explained again staff could not give her any medication unless she agreed to having home medications locked in lock box. Patient stated she needs to have access to her tylenol whenever she needed it. This RN explained we could give her tylenol per providers orders. Patient expressed dismay at not being able to have her medication at bedside but agreed it could be put in the locked box. This RN placed medication in lock box. Patient again became rude and disrespectful. This RN left patients room.

## 2023-02-10 NOTE — PROGRESS NOTES
Pt had weekly pill organizer in room with home medications, this nurse asked if medications could be put away in the lock box, pt refused. Later, when this nurse reentered the room, pt was seen taking medication from her organizer and stated it was fish oil. This nurse then alerted the charge nurse.

## 2023-02-10 NOTE — DISCHARGE SUMMARY
Hospital Medicine Discharge Summary    Patient ID: Eulogio Ruiz      Patient's PCP: Adrian Green MD    Admit Date: 2/9/2023     Discharge Date: 2/10/23    Admitting Provider: Virginia Retana MD     Discharge Provider: RUSLAN Pineda CNP     Discharge Diagnoses: Active Hospital Problems    Diagnosis     Stroke-like symptoms [R29.90]      Priority: Medium       The patient was seen and examined on day of discharge and this discharge summary is in conjunction with any daily progress note from day of discharge. Hospital Course: 72 y.o. female with past medical history significant for COPD, hypothyroidism, obesity, GERD, osteoarthritis, hyperlipidemia, hypertension, chronic pain. She presented to [facility] with complaint of dizziness for the past 2-3 days. It is worse with standing and ambulation. Denies headache, falls, LOC, vision changes. She reports previous episodes of dizziness that were brief, and not like what she is currently experiencing. She has been on a keto diet for the past several months, but is unsure how much weight she has lost. She denies chest pain, dyspnea, n/v/d/c, dysuria, fever, chills, headache. Standing aggravates her dizziness, rest alleviates. There is no radiation. ED course: orthostatic VS positive, so fluid was given. Initial labs obtained      Dizziness, resolved. Orthostatic VS were positive in the ED, she received fluid. Negative on recheck today. CT head, no intracranial abnormalities. CTA head/neck, no LVO. MRI brain, no acute infarct or intracranial abnormality. A1C reviewed and noted to be 5.3 this admission     Hyperlipidemia, uncontrolled. LDL this admission reviewed, noted to be 97. On review, pt noted to be on repatha     Hypothyroidism. Clinically euthyroid. TSH reviewed 3.44. Has outpatient endo to follow with, was actually supposed to see them today. Continued home dose levothyroxine     COPD, stable. Does not require oxygen at home. Continue home regimen. Monitor     Obesity. BMI 35.57. This complicates assessment and treatment. Placing patient at risk for multiple co-morbidities as well as early death and contributing to the patient's presentation. Counseled on weight loss    Physical Exam Performed:     /71   Pulse 52   Temp 97.9 °F (36.6 °C) (Oral)   Resp 18   Ht 5' 4\" (1.626 m)   Wt 182 lb 3.2 oz (82.6 kg)   LMP  (LMP Unknown)   SpO2 97%   BMI 31.27 kg/m²     General appearance: sitting in bed, comfortable. No apparent distress, appears stated age and cooperative. HEENT: Normal cephalic, atraumatic without obvious deformity. Pupils equal, round, and reactive to light. Extra ocular muscles intact. Conjunctivae/corneas clear. Neck: Supple, with full range of motion. No jugular venous distention. Trachea midline. Respiratory: Normal respiratory effort. Clear to auscultation, bilaterally without Rales/Wheezes/Rhonchi. Room air  Cardiovascular: Regular rate and rhythm with normal S1/S2 without murmurs, rubs or gallops. Abdomen: Soft, non-tender, non-distended with normal bowel sounds. Musculoskeletal: No clubbing, cyanosis or edema bilaterally. Full range of motion without deformity. Skin: Skin color, texture, turgor normal. No rashes or lesions. Neurologic: Neurovascularly intact without any focal sensory/motor deficits. Cranial nerves: II-XII intact, grossly non-focal.  Psychiatric: Alert and oriented, thought content appropriate, normal insight  Capillary Refill: Brisk,< 3 seconds   Peripheral Pulses: +2 palpable, equal bilaterally       Labs:  For convenience and continuity at follow-up the following most recent labs are provided:      CBC:    Lab Results   Component Value Date/Time    WBC 5.5 02/10/2023 06:45 AM    HGB 12.1 02/10/2023 06:45 AM    HCT 35.5 02/10/2023 06:45 AM     02/10/2023 06:45 AM       Renal:    Lab Results   Component Value Date/Time     02/09/2023 09:34 AM    K 3.7 02/09/2023 09:34 AM  02/09/2023 09:34 AM    CO2 27 02/09/2023 09:34 AM    BUN 17 02/09/2023 09:34 AM    CREATININE 0.9 02/09/2023 09:34 AM    CALCIUM 10.1 02/09/2023 09:34 AM    PHOS 3.7 06/20/2017 08:25 PM         Significant Diagnostic Studies    Radiology:   MRI BRAIN WO CONTRAST   Final Result   1. No acute intracranial abnormality. Specifically, no evidence of acute   infarct. 2. Involutional parenchymal changes with minimal microvascular ischemic   disease. CTA HEAD NECK W CONTRAST   Final Result   No flow limiting stenosis or occlusion within the head or neck. CT HEAD WO CONTRAST   Final Result   No acute intracranial abnormality. XR CHEST (2 VW)   Final Result   No significant findings in the chest.                Consults:     IP CONSULT TO DIETITIAN    Disposition: home     Condition at Discharge: Stable    Discharge Instructions/Follow-up:  Follow up with PCP in 7-10 days. Code Status:Full Code     Activity: activity as tolerated    Diet: regular diet      Discharge Medications:     Current Discharge Medication List             Details   pantoprazole (PROTONIX) 40 MG tablet TAKE 1 TABLET BY MOUTH EVERY MORNING BEFORE BREAKFAST  Qty: 90 tablet, Refills: 0    Associated Diagnoses: Gastroesophageal reflux disease without esophagitis      methocarbamol (ROBAXIN) 500 MG tablet TAKE 1 TABLET BY MOUTH THREE TIMES DAILY  Qty: 90 tablet, Refills: 1      levothyroxine (SYNTHROID) 100 MCG tablet TAKE 1 TABLET DAILY  Qty: 90 tablet, Refills: 1    Associated Diagnoses: Hypothyroidism, unspecified type      ibuprofen (ADVIL;MOTRIN) 400 MG tablet TAKE 1 TO 2 TABLETS 3 TIMESA DAY AS NEEDED WITH FOOD  Qty: 180 tablet, Refills: 1    Associated Diagnoses: Chronic bilateral low back pain without sciatica;  Lumbosacral spondylosis without myelopathy; DDD (degenerative disc disease), lumbar; Chronic midline thoracic back pain      MULTIPLE VITAMIN PO       Omega-3 Fatty Acids (FISH OIL) 1200 MG CAPS Take by mouth      Simethicone (GAS RELIEF EXTRA STRENGTH PO) Take by mouth Gas x oct      GLUCOSAMINE-CHONDROIT-CALCIUM PO Take 1,200 mg by mouth      zinc 50 MG TABS tablet Take 50 mg by mouth daily      Saline (SIMPLY SALINE) 0.9 % AERS by Nasal route      Isopropyl Alcohol (AURO DRI SWIMMERS EARS OT) Place in ear(s)      oxybutynin (DITROPAN) 5 MG tablet TAKE 1 TABLET TWICE A DAY  Qty: 180 tablet, Refills: 1    Associated Diagnoses: OAB (overactive bladder)      montelukast (SINGULAIR) 10 MG tablet TAKE 1 TABLET NIGHTLY  Qty: 90 tablet, Refills: 1    Associated Diagnoses: Allergic rhinitis, unspecified seasonality, unspecified trigger      omeprazole (PRILOSEC) 40 MG delayed release capsule Take 1 capsule by mouth daily  Qty: 90 capsule, Refills: 1      cetirizine (ZYRTEC) 10 MG tablet TAKE 1 TABLET BY MOUTH EVERY NIGHT  Qty: 90 tablet, Refills: 1      lactase (LACTAID) 3000 units tablet Take 1 tablet by mouth 3 times daily (with meals)  Qty: 270 tablet, Refills: 1      fluticasone (FLONASE) 50 MCG/ACT nasal spray SHAKE LIQUID AND USE 2 SPRAYS IN EACH NOSTRIL DAILY  Qty: 48 g, Refills: 1    Comments: **Patient requests 90 days supply**      albuterol sulfate HFA (PROVENTIL;VENTOLIN;PROAIR) 108 (90 Base) MCG/ACT inhaler USE 2 INHALATIONS ORALLY EVERY 6 HOURS AS NEEDED FOR SHORTNESS OF BREATH OR WHEEZING  Qty: 54 g, Refills: 1    Associated Diagnoses: Mild intermittent asthma without complication      Evolocumab (REPATHA) 140 MG/ML SOSY Inject 1 mL into the skin every 14 days  Qty: 2.1 mL, Refills: 11    Associated Diagnoses: Statin intolerance; Mixed hyperlipidemia; Risk of myocardial infarction or stroke 7.5% or greater in next 10 years      valACYclovir (VALTREX) 500 MG tablet Take 500 mg by mouth in the morning, at noon, and at bedtime      Lift Chair MISC by Does not apply route  Qty: 1 each, Refills: 0    Associated Diagnoses: Chronic bilateral low back pain without sciatica;  Lumbosacral spondylosis without myelopathy; DDD (degenerative disc disease), lumbar; Chronic midline thoracic back pain; Primary osteoarthritis of both hips      baclofen (LIORESAL) 10 MG tablet Take 1 tablet by mouth 3 times daily  Qty: 270 tablet, Refills: 3    Comments: , here is baclofen as substitution for patient robaxin since insurance won't cover; please let pt know Lluvia Ramirez,  2/2/2022 3:41 PM      Coenzyme Q10 200 MG CAPS Take 1 tablet by mouth daily  Qty: 90 capsule, Refills: 3    Associated Diagnoses: Mixed hyperlipidemia      Estradiol (VAGIFEM) 10 MCG TABS vaginal tablet       folic acid (FOLVITE) 189 MCG tablet Take 400 mcg by mouth daily      Ascorbic Acid (VITAMIN C) 1000 MG tablet Take 1,000 mg by mouth daily      Calcium Carbonate Antacid 1000 MG CHEW Take by mouth      [employer] VITAMIN D3 1000 units CAPS TAKE 1 SOFTGEL BY MOUTH DAILY  Qty: 90 capsule, Refills: 1      imiquimod (ALDARA) 5 % cream Apply topically three times a week Indications: [facility] ordering Apply topically three times a week. Qty: 12 each, Refills: 3    Associated Diagnoses: Condyloma      psyllium (KONSYL) 28.3 % POWD powder Take 1 each by mouth daily      hydrocortisone (ANUSOL-HC) 2.5 % rectal cream Place rectally 2 times daily. Qty: 1 Tube, Refills: 1    Associated Diagnoses: Anal itching; First degree hemorrhoids; Vulvar itching      acetaminophen (TYLENOL) 325 MG tablet Take 650 mg by mouth every 6 hours as needed for Pain             Time Spent on discharge: 32 minutes in the examination, evaluation, counseling and review of medications and discharge plan. Signed:    Brooklynn Baugh, RUSLAN - CNP   2/10/2023      Thank you Bro Rebolledo MD for the opportunity to be involved in this patient's care. If you have any questions or concerns, please feel free to contact me at [phone].

## 2023-02-10 NOTE — PROGRESS NOTES
Occupational Therapy    Orders received, chart reviewed. Per RN pt refusing therapy this date. OT will continue to follow pt and attempt OT eval when deemed medically appropriate and as schedule allows. Thank you.     Corine Arciniega OTR/ALMA

## 2023-02-10 NOTE — PLAN OF CARE
SLP Evaluation Completed.  All note are found in [place], [place], [place]  Speech-Language Pathologist  Phone: [phone]  Speech Desk: [phone]

## 2023-02-10 NOTE — PROGRESS NOTES
Speech Language Pathology  Clinical Bedside Swallow Assessment  Facility/Department: [place] [address] [facility]        Recommendations:  Diet recommendation: IDDSI 7 Regular Solids; IDDSI 0 Thin Liquids;  Meds whole with thin liquids  Instrumentation: Not indicated  Risk management: upright for all intake, stay upright for at least 30 mins after intake, small bites/sips, alternate bites/sips, and general aspiration precautions      Bairon Javier  : 1950 (72 y.o.)   MRN: [id number]  ROOM: [address] [place] [address]  ADMISSION DATE: 2023  PATIENT DIAGNOSIS(ES): Dizziness [R42]  Stroke-like symptoms [R29.90]  Chief Complaint   Patient presents with    Dizziness     Started yesterday morning, this is second episode in the past few weeks, has been on a diet the past 5 months, just found out she has a \" slow thyroid\"      Patient Active Problem List    Diagnosis Date Noted    Stroke-like symptoms 2023    Spider veins of both lower extremities 2021    Obesity (BMI 30-39.9) 2021    Mixed stress and urge urinary incontinence 2020    Tonsil stone 01/10/2018    Mild intermittent asthma without complication     Primary insomnia 2017    Chronic midline thoracic back pain 2017    Anxiety 2017    Lumbosacral spondylosis without myelopathy 2016    Displacement of lumbar intervertebral disc without myelopathy 2016    Chest skin lesion 2016    DDD (degenerative disc disease), lumbar 10/21/2015    Osteoarthritis of both hips 10/21/2015    Vaginal atrophy 07/15/2015    Localized osteoarthrosis, lower leg 2015    H/O vulvar dysplasia 2015    Hyperlipidemia 2013    GERD (gastroesophageal reflux disease) 2010    Hypothyroid 2010     Past Medical History:   Diagnosis Date    Allergic rhinitis     Anxiety     Asthma     Bipolar 1 disorder ([facility] [address]     questionable dx per patient    Chronic low back pain     COPD (chronic obstructive pulmonary disease) ([facility] [address]     Decreased bone density 9-9-2008    First degree hemorrhoids 9/1/2016    Former smoker 6/13/2013    GERD (gastroesophageal reflux disease)     H/O vulvar dysplasia 1/9/2015    VERONICA 3 wide local excision     Hyperlipidemia     Hypothyroid     Hypothyroidism     Insomnia     Obesity (BMI 30-39.9) 3/16/2021    Osteoarthritis     Prolonged emergence from general anesthesia     Urinary incontinence      Past Surgical History:   Procedure Laterality Date    COLONOSCOPY  18 Apr 2016    Diverticulosis, hemorrhoid    CYSTOSCOPY N/A 12/13/2021    CYSTOSCOPY,  URETHRAL DILATATION performed by Zita Diaz MD at [address] ([address] [facility])  2001    Partial ? Endometriosis - Benign    OTHER SURGICAL HISTORY  12/13/2021      cystoscopy, urethral dilation    OVARY REMOVAL      TUBAL LIGATION  1981     Allergies   Allergen Reactions    Latex Rash    Penicillins Shortness Of Breath    Lipitor [Atorvastatin Calcium] Other (See Comments)     Muscle cramps    Nexium [Esomeprazole Magnesium] Other (See Comments)     Cramps, including feet, legs, and sides of abdomen    Bactrim [Sulfamethoxazole-Trimethoprim] Other (See Comments)     Dry mouth    Ciprofloxacin Rash     Rash, headache    Etodolac Rash    Flagyl [Metronidazole] Rash    Naprosyn [Naproxen] Nausea And Vomiting     GI upset    Prednisone Other (See Comments)     Headache, increased BP       DATE ONSET: 2/9/23    Date of Evaluation: 2/10/2023   Evaluating Therapist: JORGE LUIS Garcia    Chart Reviewed: : [x] Yes [] No    Current Diet: ADULT DIET;  Regular    Recent Chest Radiography: [x] Chest XR   [] CT of Chest  Date: 2/9/23  Impression   No significant findings in the chest.     Pain: There was no reported or observed pain symptoms during the session    Reason for Referral  Genoveva Diaz was referred for a bedside swallow evaluation to assess the efficiency of their swallow function, identify signs and symptoms of aspiration and make recommendations regarding safe dietary consistencies, effective compensatory strategies, and safe eating environment. Assessment    Medical record review/interview: Per MD H&P: \"78 y.o. female with past medical history significant for COPD, hypothyroidism, obesity, GERD, osteoarthritis, hyperlipidemia, hypertension, chronic pain. She presented to [facility] with complaint of dizziness for the past 2-3 days. It is worse with standing and ambulation. Denies headache, falls, LOC, vision changes. She reports previous episodes of dizziness that were brief, and not like what she is currently experiencing. She has been on a keto diet for the past several months, but is unsure how much weight she has lost. She denies chest pain, dyspnea, n/v/d/c, dysuria, fever, chills, headache. Standing aggravates her dizziness, rest alleviates. There is no radiation. \"    Predisposing dysphagia risk factors: N/A  Clinical signs of possible chronic dysphagia: N/A  Precipitating dysphagia risk factors: N/A    Patient Complaints:  Odynophagia: [] Yes [x] No  Globus Sensation: [] Yes [x] No  SOB with PO intake: [] Yes [x] No  Increased WOB with PO intake: [] Yes [x] No  Reflux Sx's: [] Yes [x] No  Weight loss: [] Yes [x] No  Coughing/Choking with PO intake: [] Yes [x] No  Reduced Appetite: [] Yes [x] No    CBC:   Recent Labs     02/10/23  0645   WBC 5.5   HGB 12.1         BMP:  Recent Labs     02/09/23  0934      K 3.7      CO2 27   BUN 17   CREATININE 0.9   GLUCOSE 114*          Cranial nerve exam:   CN V (trigeminal): ophthalmic, maxillary, and mandibular facial sensation- WNL  CN VII (facial): WNL  CN IX/X (glossopharyngeal/vagus): MPT: WNL; pitch range: WNL; vocal quality: WNL; cough: Strong-perceptually  CN XII (hypoglossal):  WNL    Laryngeal function exam:   Secretions: WFL  Vocal quality: See CN exam above  MPT: See CN exam above   Pitch range: See CN exam above  Cough: See CN exam above    Oral Care Status:    [x] Oral Care [facility]  [] Poor oral care status  [] Edentulous  [] Upper Dentures  [] Lower Dentures  [] Missing/Broken Teeth  [] Evidence of dental cavities/carries    PO trials:   IDDSI 0 (thin): Swallows were timely; Adequate laryngeal movement; no s/s of aspiration    IDDSI 7 (regular): WFL mastication; Swallows were timely; Adequate laryngeal movement; no s/s of aspiration    Impressions:  Oral phase of swallow grossly appears WNL. No oral phase deficits noted during evaluation. Pharyngeal phase of swallow appears grossly WNL. No pharyngeal deficits noted during evaluation. Laryngeal elevation appears WFL based upon palpation of anterior neck during swallow. Vocal quality dry before and after po trials. No further Speech Therapy services are warranted. Recommendations:  Diet recommendation: IDDSI 7 Regular Solids; IDDSI 0 Thin Liquids;  Meds whole with thin liquids  Instrumentation:   Risk management: upright for all intake, stay upright for at least 30 mins after intake, small bites/sips, alternate bites/sips, slow rate of intake, general GERD precautions, and general aspiration precautions    Prognosis: Good    Recommended Intervention:   [] Dysphagia tx  [] Videostroboscopy                      [] NPO   [] MBS       [] Speech/Cog Eval    [] Therapeutic PO Trials     [] Ice Chips   [] Other:  [] FEES                                                 Dysphagia Therapeutic Intervention:   []  Bolus control Exercises  []  Oral Motor Exercises  []  [employer] Protocol  []  Thermal Stimulation  []  Oral Care    []  Vital Stim/NMES  []  Laryngeal Exercises  []  Patient/Family Education  []  Pharyngeal Exercises  []  Therapeutic PO trials with SLP  []  Diet tolerance monitoring  []  Other:     Referrals:  [] ENT    [] PT  [] Pulmonology [] GI  [] Neurology  [] RD  [] OT   []     Goals:  Short Term Goals:  Evaluation only    Long Term Goals:   Evaluation only    Treatment:  Skilled instruction completed with patient re: evidenced based practice regarding recommendations and POC, importance of oral care to reduce adverse affects in the event of aspiration, and instruction of recommended compensatory strategies developed based upon clinical exam. Pt able to recall/demonstrate compensatory strategies with min cues. Pt Education: SLP educated the patient re: Role of SLP, rationale for completion of assessment, results of assessment, and recommendations  Pt Education Response: verbalized understanding    Duration/Frequency of Tx: Evaluation only    Individuals Consulted:   [x]  Patient     []  NP         [x]  RN   []  RD                   []  MD      []  Family Member                        []  PA    []  Other:      Safety Devices / Report:  [x]  All fall risk precautions in place []  Safety handoff completed with RN  []  Bed alarm in place  []  Left in bed     []  Chair alarm in place  []  Left in chair   []  Call light in reach   []  Other: Total Treatment Time / Charges       Time in Time out Total Time / units   Swallow Eval/Tx Time  1341 1355 14 min / 1 units     Signature:   Syeda DesouzaH[place], [place], [address] [facility]  EF#[id number]  Speech-Language Pathologist  Phone: [phone]  Speech Desk: [phone]

## 2023-02-10 NOTE — CONSULTS
Nutrition Education    Consulted for diet education. Extensive discussion on diet education. Pt expresses concerns with blood sugars, cholesterol, and foods with Synthroid medication. Pt current A1c 5.3%. Pt has cut out many foods from her diet and was following a pseudo-keto diet. Only eats 2 meals daily at home and cut out most fruit from diet. Discussed overall general healthy eating habits. Pt wants to continue with sugar free desserts. Discussed certain vegetables to minimize with synthroid. Handouts added to AVS. Pt requested RD phone number if further questions arise. Will continue to monitor. Educated on heart healthy diet  Learners: Patient  Readiness: Acceptance  Method: Explanation and Handout  Response: Verbalizes Understanding  Contact name and number provided.     Rocael Palma MS, RD, LD  Contact Number: [phone]

## 2023-03-30 RX ORDER — METHOCARBAMOL 500 MG/1
TABLET, FILM COATED ORAL
Qty: 90 TABLET | Refills: 1 | OUTPATIENT
Start: 2023-03-30

## 2023-05-22 RX ORDER — CETIRIZINE HYDROCHLORIDE 10 MG/1
TABLET ORAL
Qty: 90 TABLET | Refills: 1 | OUTPATIENT
Start: 2023-05-22

## 2023-07-13 ENCOUNTER — HOSPITAL ENCOUNTER (OUTPATIENT)
Dept: PHYSICAL THERAPY | Age: 73
Setting detail: THERAPIES SERIES
Discharge: HOME | End: 2023-07-13
Payer: MEDICARE

## 2023-07-13 PROCEDURE — 97140 MANUAL THERAPY 1/> REGIONS: CPT

## 2023-07-13 PROCEDURE — 97110 THERAPEUTIC EXERCISES: CPT

## 2023-07-13 PROCEDURE — 97162 PT EVAL MOD COMPLEX 30 MIN: CPT

## 2023-07-13 NOTE — FLOWSHEET NOTE
[facility] [address] [place] [facility], [address] [facility]  Phone: [phone]  Fax [phone]    Physical Therapy Daily Treatment Note    Date:  2023    Patient Name:  Jorge Mahmood    :  1950  MRN: [id number]  Restrictions/Precautions:    Medical/Treatment Diagnosis Information:   Chronic bilateral low back pain without sciatica [M54.50, E23.72]  Insurance/Certification information:   [facility] Medicare - $20 copay  Physician Information:   Donna Alexandra MD  Plan of care signed (Y/N):  N  Visit# / total visits:       G-Code (if applicable):           [address] [place] [address]      Medicare Cap (if applicable):  N/a = total amount used, updated 2023    Time in:   10:45      Timed Treatment: 25 Total Treatment Time:  45  Time out: 11:30  ________________________________________________________________________________________    Pain Level:    /10  SUBJECTIVE:  See initial eval    OBJECTIVE:     Exercise/Equipment Resistance/Repetitions Other comments          SLR   10x B HEP   Side-lying hip ABD 10x B HEP   90/90 dowel ivan MET 10x5\" HEP                                                                                                                 Other Therapeutic Activities:      Manual Treatments:     Lumbopelvic roll B  B long leg pull  B short axis distraction  MFR to B QL    Total manual 10 minutes     Modalities:      Test/Measurements:  See initial eval       ASSESSMENT:     See initial eval    Treatment/Activity Tolerance:   [x]Patient tolerated treatment well [] Patient limited by fatique  []Patient limited by pain [] Patient limited by other medical complications  [] Other:     Goals:    Short term goal 1: Pt will be indep in HEP  Short term goal 2: Pt will decrease pain 25%  Short term goal 3: Pt will increase B hip strength to 4/5  Short term goal 4: Pt will return to 90% PLOF  Short term goal 5: Pt will improve SIXTO by 7

## 2023-08-22 ENCOUNTER — APPOINTMENT (OUTPATIENT)
Dept: PHYSICAL THERAPY | Age: 73
End: 2023-08-22
Payer: MEDICARE

## 2023-09-05 ENCOUNTER — HOSPITAL ENCOUNTER (OUTPATIENT)
Dept: PHYSICAL THERAPY | Age: 73
Setting detail: THERAPIES SERIES
Discharge: HOME | End: 2023-09-05
Payer: MEDICARE

## 2023-09-05 PROCEDURE — 97110 THERAPEUTIC EXERCISES: CPT

## 2023-09-05 PROCEDURE — 97140 MANUAL THERAPY 1/> REGIONS: CPT

## 2023-09-05 NOTE — FLOWSHEET NOTE
Pt agreeable. Treatment/Activity Tolerance:   [x]Patient tolerated treatment well [] Patient limited by fatique  []Patient limited by pain [] Patient limited by other medical complications  [] Other:     Goals:    Short term goal 1: Pt will be indep in HEP  Short term goal 2: Pt will decrease pain 25%  Short term goal 3: Pt will increase B hip strength to 4/5  Short term goal 4: Pt will return to 90% PLOF  Short term goal 5: Pt will improve SIXTO by 7 points    Plan: [x] Continue per plan of care [] Alter current plan (see comments)   [] Plan of care initiated [] Hold pending MD visit [] Discharge      Plan for Next Session: Correction of biomechanical dysfunctions as they present on visit. Restore proper motor firing pattern and functional muscle activation as presented on visit. Progress as tolerates.      Re-Certification Due Date:         Signature:  Kimberly Santana PT

## 2023-10-24 ENCOUNTER — HOSPITAL ENCOUNTER (OUTPATIENT)
Dept: WOMENS IMAGING | Age: 73
Discharge: HOME | End: 2023-10-24
Payer: MEDICARE

## 2023-10-24 DIAGNOSIS — Z12.31 SCREENING MAMMOGRAM, ENCOUNTER FOR: ICD-10-CM

## 2023-10-24 PROCEDURE — 77063 BREAST TOMOSYNTHESIS BI: CPT

## 2023-12-28 ENCOUNTER — HOSPITAL ENCOUNTER (OUTPATIENT)
Dept: PHYSICAL THERAPY | Age: 73
Setting detail: THERAPIES SERIES
Discharge: HOME | End: 2023-12-28

## 2024-01-08 ENCOUNTER — HOSPITAL ENCOUNTER (OUTPATIENT)
Dept: WOMENS IMAGING | Age: 74
Discharge: HOME | End: 2024-01-08
Payer: MEDICARE

## 2024-01-08 DIAGNOSIS — N95.8 POSTARTIFICIAL MENOPAUSAL SYNDROME: ICD-10-CM

## 2024-01-08 PROCEDURE — 77080 DXA BONE DENSITY AXIAL: CPT

## 2024-01-16 ENCOUNTER — HOSPITAL ENCOUNTER (OUTPATIENT)
Dept: PHYSICAL THERAPY | Age: 74
Setting detail: THERAPIES SERIES
Discharge: HOME | End: 2024-01-16
Payer: MEDICARE

## 2024-01-16 PROCEDURE — 97162 PT EVAL MOD COMPLEX 30 MIN: CPT

## 2024-01-16 NOTE — FLOWSHEET NOTE
[facility]- Outpatient Rehabilitation and Therapy  [address] [place] [address] office: [phone] fax: [phone]      Physical Therapy: TREATMENT/PROGRESS NOTE   Patient: Cheyenne Van (74 y.o. female)   Treatment Date: 2024   :  1950 MRN: [id number]   Visit #: 3   Insurance Allowable Auth Needed   12 []Yes    []No    Insurance: Payor: [facility] MEDICARE / Plan: [facility] MEDICARE ADVANTAGE / Product Type: *No Product type* /   Insurance ID: [id number] - (Medicare Managed)  Secondary Insurance (if applicable):    Treatment Diagnosis: mid and low back pain  No diagnosis found.   Medical Diagnosis:    Chronic bilateral low back pain without sciatica [M54.50, G89.29]   Referring Physician: Jaspal Mcdonnell MD  PCP: Jaspal Mcdonnell MD                             Plan of care signed (Y/N):     Date of Patient follow up with Physician:      Progress Report/POC: EVAL today  POC update due: (10 visits /OR AUTH LIMITS, whichever is less) 10 2/15/2024     Precautions/ Contra-indications:                                                                                          Latex allergy:  YES  Pacemaker:    NA  Contraindications for Manipulation: osteopenia  Date of Surgery: N/A  Other:      Red Flags:  None    Preferred Language for Healthcare:   [x]English       []other:    SUBJECTIVE EXAMINATION     Patient Report/Comments: Patient is having pain in midscapular region (L>R), soreness in both hips spreading to the L groin, and pain in the lower back/SI region. Patient says this started about 2 months ago, with unknown cause. Reports having a hx of pain similar to these regions but never what she is currently experiencing. Pain is typically worse in the am, improves throughout the day. Symptoms aggravated by stairs, squatting, and vacuuming. Symptoms improved with rest, sitting in her recliner, and previous PT exercises. Currently limited to standing for <30 min, walking only

## 2024-01-16 NOTE — THERAPY EVALUATION
function  Status: [] Progressing: [] Met: [] Not Met: [] Adjusted  Patient will return to stand for >45 minutes without increased symptoms or restriction to work towards return to prior level of function.  Status: [] Progressing: [] Met: [] Not Met: [] Adjusted  Patient will increase core/lumbopelvic function to allow independence in all self-care activities.   Patient will be able to squat without pain 100% of the time.             Status: [] Progressing: [] Met: [] Not Met: [] Adjusted    TREATMENT PLAN     Frequency/Duration: 2x/week for  8-12  weeks for the following treatment interventions:    Interventions:  [x] Therapeutic exercise including: strength training, ROM, including postural re-education.   [x] NMR activation and proprioception, including postural re-education.    [x] Manual therapy as indicated to include: PROM, Gr I-IV mobilizations, STM, Grade V Manipulation, and Dry Needling/IASTM  [x] Modalities as needed that may include: Cryotherapy, Electrical Stimulation, Biofeedback, Thermal Agents, Vasoneumatic Compression, Traction, Ultrasound, Iontophoresis, Paraffin, and Whirlpool  [x] Patient education on joint protection, postural re-education, activity modification, progression of HEP.        [x] Aquatic Therapy     HEP instruction: Refer to HEP instructions outlined on the flowsheet on 01/16/2024.    Electronically Signed by Taj Castillo PT  Date: 01/16/2024

## 2024-01-24 ENCOUNTER — HOSPITAL ENCOUNTER (OUTPATIENT)
Dept: PHYSICAL THERAPY | Age: 74
Setting detail: THERAPIES SERIES
Discharge: HOME | End: 2024-01-24
Payer: MEDICARE

## 2024-01-24 PROCEDURE — 97112 NEUROMUSCULAR REEDUCATION: CPT

## 2024-01-24 PROCEDURE — 97110 THERAPEUTIC EXERCISES: CPT

## 2024-10-24 ENCOUNTER — HOSPITAL ENCOUNTER (OUTPATIENT)
Dept: WOMENS IMAGING | Age: 74
Discharge: HOME | End: 2024-10-24
Payer: MEDICARE

## 2024-10-24 VITALS — HEIGHT: 63 IN | BODY MASS INDEX: 27.82 KG/M2 | WEIGHT: 157 LBS

## 2024-10-24 DIAGNOSIS — Z12.31 ENCOUNTER FOR SCREENING MAMMOGRAM FOR MALIGNANT NEOPLASM OF BREAST: ICD-10-CM

## 2024-10-24 PROCEDURE — 77063 BREAST TOMOSYNTHESIS BI: CPT

## 2025-01-14 NOTE — TELEPHONE ENCOUNTER
I spoke with the pt, she states that the prescription bottle she has is 4 times daily. Never smoker  No alcohol use  No recreational drug use

## 2025-06-11 ENCOUNTER — HOSPITAL ENCOUNTER (OUTPATIENT)
Dept: PHYSICAL THERAPY | Age: 75
Setting detail: THERAPIES SERIES
Discharge: HOME | End: 2025-06-11

## 2025-06-11 DIAGNOSIS — M25.511 ACUTE PAIN OF RIGHT SHOULDER: Primary | ICD-10-CM

## (undated) DEVICE — GOWN SIRUS NONREIN LG W/TWL: Brand: MEDLINE INDUSTRIES, INC.

## (undated) DEVICE — PREP SOL PVP IODINE 4%  4 OZ/BTL

## (undated) DEVICE — GAUZE,SPONGE,4"X4",8PLY,STRL,LF,10/TRAY: Brand: MEDLINE

## (undated) DEVICE — GLOVE ORANGE PI 8   MSG9080

## (undated) DEVICE — CANNULA NSL 13FT TUBE AD ETCO2 DIV SAMP M

## (undated) DEVICE — CYSTO/BLADDER IRRIGATION SET, REGULATING CLAMP

## (undated) DEVICE — MEDI-VAC NON-CONDUCTIVE SUCTION TUBING: Brand: CARDINAL HEALTH

## (undated) DEVICE — BOWL MED L 32OZ PLAS W/ MOLD GRAD EZ OPN PEEL PCH

## (undated) DEVICE — SOLUTION IRRIGATION STRL H2O 1000 ML UROMATIC CONTAINER

## (undated) DEVICE — BAG URIN STRL FOR URO CTCH SYS

## (undated) DEVICE — SOLUTION IV IRRIG 500ML 0.9% SODIUM CHL 2F7123

## (undated) DEVICE — DBD-PACK,CYSTOSCOPY,PK VI,AURORA: Brand: MEDLINE